# Patient Record
Sex: MALE | Race: WHITE | Employment: OTHER | URBAN - METROPOLITAN AREA
[De-identification: names, ages, dates, MRNs, and addresses within clinical notes are randomized per-mention and may not be internally consistent; named-entity substitution may affect disease eponyms.]

---

## 2017-05-16 ENCOUNTER — OFFICE VISIT (OUTPATIENT)
Dept: ENDOCRINOLOGY | Age: 72
End: 2017-05-16

## 2017-05-16 VITALS
HEART RATE: 72 BPM | HEIGHT: 67 IN | DIASTOLIC BLOOD PRESSURE: 75 MMHG | TEMPERATURE: 97.7 F | RESPIRATION RATE: 18 BRPM | BODY MASS INDEX: 26.68 KG/M2 | OXYGEN SATURATION: 98 % | SYSTOLIC BLOOD PRESSURE: 144 MMHG | WEIGHT: 170 LBS

## 2017-05-16 DIAGNOSIS — E78.2 MIXED HYPERLIPIDEMIA: ICD-10-CM

## 2017-05-16 DIAGNOSIS — I10 ESSENTIAL HYPERTENSION: ICD-10-CM

## 2017-05-16 RX ORDER — SITAGLIPTIN 100 MG/1
1 TABLET, FILM COATED ORAL DAILY
Refills: 3 | COMMUNITY
Start: 2017-05-01 | End: 2017-05-16 | Stop reason: SDUPTHER

## 2017-05-16 RX ORDER — GLIPIZIDE 5 MG/1
TABLET ORAL
Qty: 60 TAB | Refills: 6 | Status: SHIPPED | OUTPATIENT
Start: 2017-05-16 | End: 2018-01-09 | Stop reason: SDUPTHER

## 2017-05-16 RX ORDER — CETIRIZINE HCL 10 MG
10 TABLET ORAL DAILY
COMMUNITY

## 2017-05-16 RX ORDER — METFORMIN HYDROCHLORIDE 500 MG/1
1000 TABLET ORAL 2 TIMES DAILY WITH MEALS
Qty: 120 TAB | Refills: 6 | Status: SHIPPED | OUTPATIENT
Start: 2017-05-16 | End: 2018-01-09 | Stop reason: SDUPTHER

## 2017-05-16 RX ORDER — LANCETS 28 GAUGE
EACH MISCELLANEOUS
Qty: 100 LANCET | Refills: 6 | Status: SHIPPED | OUTPATIENT
Start: 2017-05-16 | End: 2017-06-26 | Stop reason: SDUPTHER

## 2017-05-16 NOTE — MR AVS SNAPSHOT
Visit Information Date & Time Provider Department Dept. Phone Encounter #  
 5/16/2017  3:00 PM Airam Todd MD [facility] [phone] [id number] Follow-up Instructions Return in about 3 months (around 8/16/2017). Upcoming Health Maintenance Date Due Hepatitis C Screening 1945 DTaP/Tdap/Td series (1 - Tdap) 11/19/1967 FOBT Q 1 YEAR AGE 50-75 11/19/1996 ZOSTER VACCINE AGE 60> 11/19/2006 GLAUCOMA SCREENING Q2Y 11/19/2011 Pneumococcal 65+ Low/Medium Risk (1 of 2 - PCV13) 11/19/2011 MEDICARE YEARLY EXAM 11/19/2011 INFLUENZA AGE 9 TO ADULT 8/1/2017 Allergies as of 5/16/2017  Review Complete On: 5/16/2017 By: Airam Todd MD  
 No Known Allergies Current Immunizations  Never Reviewed No immunizations on file. Not reviewed this visit Vitals BP Pulse Temp Resp Height(growth percentile) Weight(growth percentile) 144/75 72 97.7 °F (36.5 °C) (Oral) 18 5' 7\" (1.702 m) 170 lb (77.1 kg) SpO2 BMI Smoking Status 98% 26.63 kg/m2 Never Smoker Vitals History BMI and BSA Data Body Mass Index Body Surface Area  
 26.63 kg/m 2 1.91 m 2 Your Updated Medication List  
  
   
This list is accurate as of: 5/16/17  4:06 PM.  Always use your most recent med list.  
  
  
  
  
 cetirizine 10 mg tablet Commonly known as:  ZYRTEC Take  by mouth. JANUVIA 100 mg tablet Generic drug:  SITagliptin Take 1 Tab by mouth daily. METFORMIN PO Take 500 mg by mouth two (2) times a day. Follow-up Instructions Return in about 3 months (around 8/16/2017). Patient Instructions Check blood sugars only twice a day by rotation as follows First day - before b-fast and - before lunch Second day- before dinner and  before bed time After 2 days go back to same rotation 
 
 
--------------------------------------------------------------------------- ---------------------- Stay on metformin  500 mg twice a day with meals  
 
 
januvia 100 mg a day Start on  Glipizide 5 mg twice  A day , twenty minutes before b-fast and dinner Stop glipizide ER  
 
 
--------------------------------------------------------------------------------------------------------- Do not skip meals Do not eat in between meals Reduce carbs- pasta, rice, potatoes, bread Do not drink juices or sodas Donot eat peanut butter Do not eat sugar free cookies and cakes Do not eat peaches , oranges, grapes, pineapples , raisins Introducing [facility] & HEALTH SERVICES! [facility] introduces [employer] patient portal. Now you can access parts of your medical record, email your doctor's office, and request medication refills online. 1. In your internet browser, go to [email or web address] 2. Click on the First Time User? Click Here link in the Sign In box. You will see the New Member Sign Up page. 3. Enter your [employer] Access Code exactly as it appears below. You will not need to use this code after youve completed the sign-up process. If you do not sign up before the expiration date, you must request a new code. · [employer] Access Code: [email or web address] Expires: 8/14/2017  4:06 PM 
 
4. Enter the last four digits of your Social Security Number (xxxx) and Date of Birth (mm/dd/yyyy) as indicated and click Submit. You will be taken to the next sign-up page. 5. Create a Telunjukt ID. This will be your [employer] login ID and cannot be changed, so think of one that is secure and easy to remember. 6. Create a [employer] password. You can change your password at any time. 7. Enter your Password Reset Question and Answer. This can be used at a later time if you forget your password. 8. Enter your e-mail address. You will receive e-mail notification when new information is available in [address] 9. Click Sign Up. You can now view and download portions of your medical record. 10. Click the Download Summary menu link to download a portable copy of your medical information. If you have questions, please visit the Frequently Asked Questions section of the [employer] website. Remember, [employer] is NOT to be used for urgent needs. For medical emergencies, dial 911. Now available from your iPhone and Android! Please provide this summary of care documentation to your next provider. Your primary care clinician is listed as Yani Palumbo III. If you have any questions after today's visit, please call [phone].

## 2017-05-16 NOTE — PATIENT INSTRUCTIONS
Check blood sugars only twice a day by rotation as follows    First day - before b-fast and - before lunch  Second day- before dinner and  before bed time    After 2 days go back to same rotation      -------------------------------------------------------------------------------------------------    Stay on metformin  500 mg twice a day with meals       januvia 100 mg a day       Start on  Glipizide 5 mg twice  A day , twenty minutes before b-fast and dinner   Stop glipizide ER       ---------------------------------------------------------------------------------------------------------          Do not skip meals  Do not eat in between meals    Reduce carbs- pasta, rice, potatoes, bread   Do not drink juices or sodas  Donot eat peanut butter     Do not eat sugar free cookies and cakes   Do not eat peaches , oranges, grapes, pineapples , raisins

## 2017-05-16 NOTE — PROGRESS NOTES
HISTORY OF PRESENT ILLNESS  Anuj Zambrano is a 80 y.o. male. HPI  Patient here for initial visit of Type 2 diabetes mellitus . Referred : by pcp    H/o diabetes for 10  years     Current A1C is 8.7 %   From   March 8 2017  and symptoms/problems include fluctuant sugars  and polyuria     Current diabetic medications include glipizide xr 2.5 mg a day and metformin. Current monitoring regimen: home blood tests - daily  Home blood sugar records: trend: fluctuating a lot  Any episodes of hypoglycemia? no    Weight trend: fluctuating a lot  Prior visit with dietician: no  Current diet: \"unhealthy\" diet in general  Current exercise: no regular exercise    Known diabetic complications: none  Cardiovascular risk factors: dyslipidemia, diabetes mellitus, obesity, male gender, hypertension, stress    Eye exam current (within one year): yes  AISHA: no     Past Medical History:   Diagnosis Date    DM (diabetes mellitus) ([facility] [address]      Past Surgical History:   Procedure Laterality Date    HX CYST REMOVAL  1971    HX HEMORRHOIDECTOMY      HX TONSILLECTOMY  1950     Current Outpatient Prescriptions   Medication Sig    JANUVIA 100 mg tablet Take 1 Tab by mouth daily.  METFORMIN HCL (METFORMIN PO) Take 500 mg by mouth two (2) times a day.  GLIPIZIDE PO Take 2.5 mg by mouth two (2) times a day.  cetirizine (ZYRTEC) 10 mg tablet Take  by mouth. No current facility-administered medications for this visit. Review of Systems   Constitutional: Negative. HENT: Negative. Eyes: Negative for pain and redness. Respiratory: Negative. Cardiovascular: Negative for chest pain, palpitations and leg swelling. Gastrointestinal: Negative. Negative for constipation. Genitourinary: Negative. Musculoskeletal: Negative for myalgias. Skin: Negative. Neurological: Negative. Endo/Heme/Allergies: Negative. Psychiatric/Behavioral: Negative for depression and memory loss. The patient does not have insomnia. Physical Exam   Constitutional: He is oriented to person, place, and time. He appears well-developed and well-nourished. HENT:   Head: Normocephalic. Eyes: Conjunctivae and EOM are normal. Pupils are equal, round, and reactive to light. Neck: Normal range of motion. Neck supple. No JVD present. No tracheal deviation present. No thyromegaly present. Cardiovascular: Normal rate, regular rhythm and normal heart sounds. Pulmonary/Chest: Effort normal and breath sounds normal.   Abdominal: Soft. Bowel sounds are normal.   Musculoskeletal: Normal range of motion. Lymphadenopathy:     He has no cervical adenopathy. Neurological: He is alert and oriented to person, place, and time. He has normal reflexes. Skin: Skin is warm. Psychiatric: He has a normal mood and affect. Reviewed infor from pcp       ASSESSMENT and PLAN    1. Type 2 DM uncontrolled : a1c is 8.7 %    From march 8 2017   Discussed DM 2 patho-physiology     Stay on metformin  500 mg twice a day with meals   januvia 100 mg a day   Start on  Glipizide 5 mg twice  A day , twenty minutes before b-fast and dinner   Stop glipizide ER     Patient is advised to check blood sugars 1-2 times daily by rotation method. reviewed medications and side effects in detail  lab results and schedule of future lab studies reviewed with patient    specific diabetic recommendations: diabetic diet discussed in detail, written exchange diet given, low cholesterol diet, weight control and daily exercise discussed, home glucose monitoring emphasized, glucose meter dispensed to patient, all medications, side effects and compliance discussed carefully, use and side effects of insulin is taught, foot care discussed and Podiatry visits discussed, annual eye examinations at Ophthalmology discussed, glycohemoglobin and other lab monitoring discussed and long term diabetic complications discussed    2. Hypoglycemia :  Educated on treating the hypoglycemia. 3. HTN : continue current care. Patient is educated about importance of compliance with anti-hypertensives especially ARB/ACEI    4. Dyslipidemia : continue current meds. Patient is educated about benefits and adverse effects of statins and explained how benefits outweigh risk.     5. use of aspirin to prevent MI and TIA's discussed        > 50 % of time is spent on counseling

## 2017-05-22 PROBLEM — E78.2 MIXED HYPERLIPIDEMIA: Status: ACTIVE | Noted: 2017-05-22

## 2017-05-22 PROBLEM — I10 ESSENTIAL HYPERTENSION: Status: ACTIVE | Noted: 2017-05-22

## 2017-06-26 RX ORDER — LANCETS 28 GAUGE
EACH MISCELLANEOUS
Qty: 200 LANCET | Refills: 6 | Status: SHIPPED | OUTPATIENT
Start: 2017-06-26 | End: 2017-06-26 | Stop reason: SDUPTHER

## 2017-06-26 NOTE — TELEPHONE ENCOUNTER
Patient says he would like prescription for testing strips 4 times per day. Patient says he is running out of test strips because he has to test several time due to low blood sugar.

## 2017-06-27 RX ORDER — GLUCOSAM/CHON-MSM1/C/MANG/BOSW 500-416.6
TABLET ORAL
Qty: 400 LANCET | Refills: 6 | Status: SHIPPED | OUTPATIENT
Start: 2017-06-27 | End: 2021-08-09

## 2017-08-24 ENCOUNTER — OFFICE VISIT (OUTPATIENT)
Dept: ENDOCRINOLOGY | Age: 72
End: 2017-08-24

## 2017-08-24 VITALS
HEART RATE: 75 BPM | OXYGEN SATURATION: 99 % | SYSTOLIC BLOOD PRESSURE: 138 MMHG | DIASTOLIC BLOOD PRESSURE: 67 MMHG | RESPIRATION RATE: 16 BRPM | BODY MASS INDEX: 26.12 KG/M2 | TEMPERATURE: 97 F | HEIGHT: 67 IN | WEIGHT: 166.4 LBS

## 2017-08-24 DIAGNOSIS — I10 ESSENTIAL HYPERTENSION: ICD-10-CM

## 2017-08-24 DIAGNOSIS — E78.2 MIXED HYPERLIPIDEMIA: ICD-10-CM

## 2017-08-24 NOTE — PROGRESS NOTES
Leida Zambrano is a 80 y.o. male here for   Chief Complaint   Patient presents with    Diabetes       Functional glucose monitor and record keeping system? - yes  Eye exam within last year? - yes  Foot exam within last year? - no    1. Have you been to the ER, urgent care clinic since your last visit? Hospitalized since your last visit? - no    2. Have you seen or consulted any other health care providers outside of the [address] [place] [address] since your last visit?   Include any pap smears or colon screening.-      No results found for: HBA1C, HGBE8, EAI4NLBG, LGX0KKFG    Wt Readings from Last 3 Encounters:   08/24/17 166 lb 6.4 oz (75.5 kg)   05/16/17 170 lb (77.1 kg)     Temp Readings from Last 3 Encounters:   08/24/17 97 °F (36.1 °C) (Oral)   05/16/17 97.7 °F (36.5 °C) (Oral)     BP Readings from Last 3 Encounters:   08/24/17 138/67   05/16/17 144/75     Pulse Readings from Last 3 Encounters:   08/24/17 75   05/16/17 72

## 2017-08-24 NOTE — PROGRESS NOTES
HISTORY OF PRESENT ILLNESS  Sheryl Zambrano is a 80 y.o. male. HPI  Patient here for  Second visit  After  initial visit of Type 2 diabetes mellitus from May 2017       Lost 4 lbs    Kept log very well   He followed   Instructions so diligently   He has questions like , why fluctuant even though his sugars are great         Referred : by pcp    H/o diabetes for 10  years     Current A1C is 8.7 %   From   March 8 2017  and symptoms/problems include fluctuant sugars  and polyuria     Current diabetic medications include glipizide xr 2.5 mg a day and metformin. Current monitoring regimen: home blood tests - daily  Home blood sugar records: trend: fluctuating a lot  Any episodes of hypoglycemia? no    Weight trend: fluctuating a lot  Prior visit with dietician: no  Current diet: \"unhealthy\" diet in general  Current exercise: no regular exercise    Known diabetic complications: none  Cardiovascular risk factors: dyslipidemia, diabetes mellitus, obesity, male gender, hypertension, stress    Eye exam current (within one year): yes  AISHA: no     Past Medical History:   Diagnosis Date    DM (diabetes mellitus) ([facility] [address]      Past Surgical History:   Procedure Laterality Date    HX CYST REMOVAL  1971    HX HEMORRHOIDECTOMY      HX TONSILLECTOMY  1950     Current Outpatient Prescriptions   Medication Sig    TRUEPLUS LANCETS 28 gauge misc TEST FOUR TIMES DAILY    glucose blood VI test strips (TRUE METRIX GLUCOSE TEST STRIP) strip Test 4 times daily. Dx code E11.65    cetirizine (ZYRTEC) 10 mg tablet Take  by mouth.  SITagliptin (JANUVIA) 100 mg tablet Take 1 Tab by mouth daily.  metFORMIN (GLUCOPHAGE) 500 mg tablet Take 2 Tabs by mouth two (2) times daily (with meals). Note the increase    glipiZIDE (GLUCOTROL) 5 mg tablet Take 1 tab 20 minutes before breakfast, and 1 tab 20 minutes before dinner. Stop XR     No current facility-administered medications for this visit.         Review of Systems   Constitutional: Negative. HENT: Negative. Eyes: Negative for pain and redness. Respiratory: Negative. Cardiovascular: Negative for chest pain, palpitations and leg swelling. Gastrointestinal: Negative. Negative for constipation. Genitourinary: Negative. Musculoskeletal: Negative for myalgias. Skin: Negative. Neurological: Negative. Endo/Heme/Allergies: Negative. Psychiatric/Behavioral: Negative for depression and memory loss. The patient does not have insomnia. Physical Exam   Constitutional: He is oriented to person, place, and time. He appears well-developed and well-nourished. HENT:   Head: Normocephalic. Eyes: Conjunctivae and EOM are normal. Pupils are equal, round, and reactive to light. Neck: Normal range of motion. Neck supple. No JVD present. No tracheal deviation present. No thyromegaly present. Cardiovascular: Normal rate, regular rhythm and normal heart sounds. Pulmonary/Chest: Effort normal and breath sounds normal.   Abdominal: Soft. Bowel sounds are normal.   Musculoskeletal: Normal range of motion. Lymphadenopathy:     He has no cervical adenopathy. Neurological: He is alert and oriented to person, place, and time. He has normal reflexes. Skin: Skin is warm. Psychiatric: He has a normal mood and affect. Labs reviewed from aug 16 2017 from pcp       ASSESSMENT and PLAN    1. Type 2 DM uncontrolled : a1c is   7 %     From    August 16th 2017     Compared to   8.7 %    From march 8 2017   Good improvement   Increase  metformin  500 mg  To two pills twice a day with meals   januvia 100 mg a day   Start on  Glipizide 5 mg twice  A day , twenty minutes before b-fast and dinner   Stop glipizide ER     Patient is advised to check blood sugars 1-2 times daily by rotation method. reviewed medications and side effects in detail  lab results and schedule of future lab studies reviewed with patient      2. Hypoglycemia :  Educated on treating the hypoglycemia. 3. HTN : continue current care. Patient is educated about importance of compliance with anti-hypertensives especially ARB/ACEI    4. Dyslipidemia : continue current meds. Patient is educated about benefits and adverse effects of statins and explained how benefits outweigh risk.     5. use of aspirin to prevent MI and TIA's discussed        > 50 % of time is spent on counseling

## 2017-08-24 NOTE — MR AVS SNAPSHOT
Visit Information Date & Time Provider Department Dept. Phone Encounter #  
 8/24/2017  9:45 AM Manas Soto MD [facility] [phone] [id number] Follow-up Instructions Return in about 6 months (around 2/24/2018). Upcoming Health Maintenance Date Due Hepatitis C Screening 1945 HEMOGLOBIN A1C Q6M 1945 FOOT EXAM Q1 11/19/1956 MICROALBUMIN Q1 11/19/1956 EYE EXAM RETINAL OR DILATED Q1 11/19/1956 DTaP/Tdap/Td series (1 - Tdap) 11/19/1967 FOBT Q 1 YEAR AGE 50-75 11/19/1996 ZOSTER VACCINE AGE 60> 9/19/2006 GLAUCOMA SCREENING Q2Y 11/19/2011 Pneumococcal 65+ Low/Medium Risk (1 of 2 - PCV13) 11/19/2011 MEDICARE YEARLY EXAM 11/19/2011 INFLUENZA AGE 9 TO ADULT 8/1/2017 LIPID PANEL Q1 3/9/2018 Allergies as of 8/24/2017  Review Complete On: 8/24/2017 By: Manas Soto MD  
 No Known Allergies Current Immunizations  Never Reviewed No immunizations on file. Not reviewed this visit You Were Diagnosed With   
  
 Codes Comments Uncontrolled type 2 diabetes mellitus with complication, without long-term current use of insulin ([facility] [address]    -  Primary ICD-10-CM: E11.8, E11.65 ICD-9-CM: 250.82 Essential hypertension     ICD-10-CM: I10 
ICD-9-CM: 401.9 Mixed hyperlipidemia     ICD-10-CM: E78.2 ICD-9-CM: 272.2 Vitals BP Pulse Temp Resp Height(growth percentile) Weight(growth percentile)  
 138/67 (BP 1 Location: Right arm, BP Patient Position: Sitting) 75 97 °F (36.1 °C) (Oral) 16 5' 7\" (1.702 m) 166 lb 6.4 oz (75.5 kg) SpO2 BMI Smoking Status 99% 26.06 kg/m2 Never Smoker Vitals History BMI and BSA Data Body Mass Index Body Surface Area 26.06 kg/m 2 1.89 m 2 Preferred Pharmacy Pharmacy Name Phone [facility] [address] [phone] Your Updated Medication List  
  
 This list is accurate as of: 8/24/17 10:51 AM.  Always use your most recent med list.  
  
  
  
  
 cetirizine 10 mg tablet Commonly known as:  ZYRTEC Take  by mouth. glipiZIDE 5 mg tablet Commonly known as:  Keyur Erps Take 1 tab 20 minutes before breakfast, and 1 tab 20 minutes before dinner. Stop XR  
  
 glucose blood VI test strips strip Commonly known as:  TRUE METRIX GLUCOSE TEST STRIP Test 4 times daily. Dx code E11.65  
  
 metFORMIN 500 mg tablet Commonly known as:  GLUCOPHAGE Take 2 Tabs by mouth two (2) times daily (with meals). Note the increase SITagliptin 100 mg tablet Commonly known as:  Henrietta Lashawn Take 1 Tab by mouth daily. TRUEPLUS LANCETS 28 gauge Misc Generic drug:  lancets TEST FOUR TIMES DAILY Follow-up Instructions Return in about 6 months (around 2/24/2018). Patient Instructions Check blood sugars only twice a day by rotation as follows First day - before b-fast and - before lunch Second day- before dinner and  before bed time After 2 days go back to same rotation 
 
 
------------------------------------------------------------------------------------------------- Increase  On  metformin  500 mg to 2 pills  twice a day with meals  
 
 
contineu  On januvia 100 mg a day Stay  on  Glipizide 5 mg twice  A day , twenty minutes before b-fast and dinner Stop glipizide ER  
 
 
--------------------------------------------------------------------------------------------------------- Do not skip meals Do not eat in between meals Reduce carbs- pasta, rice, potatoes, bread Do not drink juices or sodas Donot eat peanut butter Do not eat sugar free cookies and cakes Do not eat peaches , oranges, grapes, pineapples , raisins Introducing [facility] & [facility]!    
 [facility] introduces [employer] patient portal. Now you can access parts of your medical record, email your doctor's office, and request medication refills online. 1. In your internet browser, go to [email or web address] 2. Click on the First Time User? Click Here link in the Sign In box. You will see the New Member Sign Up page. 3. Enter your [employer] Access Code exactly as it appears below. You will not need to use this code after youve completed the sign-up process. If you do not sign up before the expiration date, you must request a new code. · [employer] Access Code: [email or web address] Expires: 11/22/2017 10:50 AM 
 
4. Enter the last four digits of your Social Security Number (xxxx) and Date of Birth (mm/dd/yyyy) as indicated and click Submit. You will be taken to the next sign-up page. 5. Create a [employer] ID. This will be your [employer] login ID and cannot be changed, so think of one that is secure and easy to remember. 6. Create a [employer] password. You can change your password at any time. 7. Enter your Password Reset Question and Answer. This can be used at a later time if you forget your password. 8. Enter your e-mail address. You will receive e-mail notification when new information is available in [address] 9. Click Sign Up. You can now view and download portions of your medical record. 10. Click the Download Summary menu link to download a portable copy of your medical information. If you have questions, please visit the Frequently Asked Questions section of the [employer] website. Remember, [employer] is NOT to be used for urgent needs. For medical emergencies, dial 911. Now available from your iPhone and Android! Please provide this summary of care documentation to your next provider. Your primary care clinician is listed as Jonathan Yanes III. If you have any questions after today's visit, please call [phone].

## 2017-08-24 NOTE — PATIENT INSTRUCTIONS
Check blood sugars only twice a day by rotation as follows    First day - before b-fast and - before lunch  Second day- before dinner and  before bed time    After 2 days go back to same rotation      -------------------------------------------------------------------------------------------------    Increase  On  metformin  500 mg to 2 pills  twice a day with meals       contineu  On januvia 100 mg a day       Stay  on  Glipizide 5 mg twice  A day , twenty minutes before b-fast and dinner   Stop glipizide ER       ---------------------------------------------------------------------------------------------------------          Do not skip meals  Do not eat in between meals    Reduce carbs- pasta, rice, potatoes, bread   Do not drink juices or sodas  Donot eat peanut butter     Do not eat sugar free cookies and cakes   Do not eat peaches , oranges, grapes, pineapples , raisins

## 2018-01-09 ENCOUNTER — TELEPHONE (OUTPATIENT)
Dept: ENDOCRINOLOGY | Age: 73
End: 2018-01-09

## 2018-01-09 RX ORDER — GLIPIZIDE 5 MG/1
TABLET ORAL
Qty: 60 TAB | Refills: 6 | Status: SHIPPED | OUTPATIENT
Start: 2018-01-09 | End: 2018-01-11 | Stop reason: SDUPTHER

## 2018-01-09 RX ORDER — METFORMIN HYDROCHLORIDE 500 MG/1
1000 TABLET ORAL 2 TIMES DAILY WITH MEALS
Qty: 120 TAB | Refills: 6 | Status: SHIPPED | OUTPATIENT
Start: 2018-01-09 | End: 2018-01-11 | Stop reason: SDUPTHER

## 2018-01-11 RX ORDER — GLIPIZIDE 5 MG/1
TABLET ORAL
Qty: 180 TAB | Refills: 4 | Status: SHIPPED | OUTPATIENT
Start: 2018-01-11 | End: 2019-04-11 | Stop reason: SDUPTHER

## 2018-01-11 RX ORDER — METFORMIN HYDROCHLORIDE 500 MG/1
1000 TABLET ORAL 2 TIMES DAILY WITH MEALS
Qty: 360 TAB | Refills: 4 | Status: SHIPPED | OUTPATIENT
Start: 2018-01-11 | End: 2019-01-30 | Stop reason: SDUPTHER

## 2018-02-08 LAB
CREATININE, EXTERNAL: 0.97
HBA1C MFR BLD HPLC: 7.2 %
LDL-C, EXTERNAL: 126

## 2018-02-16 ENCOUNTER — OFFICE VISIT (OUTPATIENT)
Dept: ENDOCRINOLOGY | Age: 73
End: 2018-02-16

## 2018-02-16 VITALS
DIASTOLIC BLOOD PRESSURE: 67 MMHG | WEIGHT: 165.9 LBS | RESPIRATION RATE: 18 BRPM | OXYGEN SATURATION: 97 % | BODY MASS INDEX: 26.04 KG/M2 | HEART RATE: 90 BPM | TEMPERATURE: 98 F | HEIGHT: 67 IN | SYSTOLIC BLOOD PRESSURE: 124 MMHG

## 2018-02-16 DIAGNOSIS — E11.65 UNCONTROLLED TYPE 2 DIABETES MELLITUS WITH HYPERGLYCEMIA, WITHOUT LONG-TERM CURRENT USE OF INSULIN (HCC): Primary | ICD-10-CM

## 2018-02-16 DIAGNOSIS — I10 ESSENTIAL HYPERTENSION: ICD-10-CM

## 2018-02-16 DIAGNOSIS — E78.2 MIXED HYPERLIPIDEMIA: ICD-10-CM

## 2018-02-16 RX ORDER — PRAVASTATIN SODIUM 20 MG/1
20 TABLET ORAL
Qty: 90 TAB | Refills: 4 | Status: SHIPPED | OUTPATIENT
Start: 2018-02-16 | End: 2019-05-30 | Stop reason: SDUPTHER

## 2018-02-16 NOTE — MR AVS SNAPSHOT
[address] [facility] [address] 
[phone] Patient: Charlie Ramon MRN: [id number] :1945 Visit Information Date & Time Provider Department Dept. Phone Encounter #  
 2018  9:00 AM Amador Andersen MD [facility] Diabetes & Endocrinology [phone] [id number] Upcoming Health Maintenance Date Due Hepatitis C Screening 1945 FOOT EXAM Q1 1956 EYE EXAM RETINAL OR DILATED Q1 1956 DTaP/Tdap/Td series (1 - Tdap) 1967 FOBT Q 1 YEAR AGE 50-75 1996 ZOSTER VACCINE AGE 60> 2006 GLAUCOMA SCREENING Q2Y 2011 Pneumococcal 65+ Low/Medium Risk (1 of 2 - PCV13) 2011 MEDICARE YEARLY EXAM 2011 Influenza Age 5 to Adult 2017 HEMOGLOBIN A1C Q6M 2018 MICROALBUMIN Q1 2018 LIPID PANEL Q1 2018 Allergies as of 2018  Review Complete On: 2018 By: Amador nAdersen MD  
 No Known Allergies Current Immunizations  Never Reviewed No immunizations on file. Not reviewed this visit Vitals BP Pulse Temp Resp Height(growth percentile) Weight(growth percentile) 124/67 (BP 1 Location: Right arm, BP Patient Position: Sitting) 90 98 °F (36.7 °C) (Oral) 18 5' 7\" (1.702 m) 165 lb 14.4 oz (75.3 kg) SpO2 BMI Smoking Status 97% 25.98 kg/m2 Never Smoker BMI and BSA Data Body Mass Index Body Surface Area  
 25.98 kg/m 2 1.89 m 2 Preferred Pharmacy Pharmacy Name Phone [id number]- [place], [place] - [address] AT [facility] [phone] Your Updated Medication List  
  
   
This list is accurate as of: 18  9:46 AM.  Always use your most recent med list.  
  
  
  
  
 cetirizine 10 mg tablet Commonly known as:  ZYRTEC Take  by mouth. glipiZIDE 5 mg tablet Commonly known as:  Mahendra Garcia Take 1 tab 20 minutes before breakfast, and 1 tab 20 minutes before dinner. Stop XR  
  
 glucose blood VI test strips strip Commonly known as:  TRUE METRIX GLUCOSE TEST STRIP Test 4 times daily. Dx code E11.65  
  
 metFORMIN 500 mg tablet Commonly known as:  GLUCOPHAGE Take 2 Tabs by mouth two (2) times daily (with meals). Note the increase SITagliptin 100 mg tablet Commonly known as:  Marrianne Orange Take 1 Tab by mouth daily. TRUEPLUS LANCETS 28 gauge Misc Generic drug:  lancets TEST FOUR TIMES DAILY Patient Instructions Check blood sugars only twice a day by rotation as follows First day - before b-fast and - before lunch Second day- before dinner and  before bed time After 2 days go back to same rotation 
 
 
------------------------------------------------------------------------------------------------- Start on pravachol 20 mg at night Stay  on  metformin  500 mg to 2 pills  twice a day with meals  
 
 
continue  On januvia 100 mg a day Stay  on  Glipizide 5 mg twice  A day , twenty minutes before b-fast and dinner Stop glipizide ER  
 
 
--------------------------------------------------------------------------------------------------------- Do not skip meals Do not eat in between meals Reduce carbs- pasta, rice, potatoes, bread Do not drink juices or sodas Donot eat peanut butter Do not eat sugar free cookies and cakes Do not eat peaches , oranges, grapes, pineapples , raisins Introducing [facility]! Jenn Villatoro introduces PetMD patient portal. Now you can access parts of your medical record, email your doctor's office, and request medication refills online. 1. In your internet browser, go to [email or web address] 2. Click on the First Time User? Click Here link in the Sign In box. You will see the New Member Sign Up page. 3. Enter your Petflow Access Code exactly as it appears below. You will not need to use this code after youve completed the sign-up process. If you do not sign up before the expiration date, you must request a new code. · Petflow Access Code: [email or web address] Expires: 5/17/2018  9:46 AM 
 
4. Enter the last four digits of your Social Security Number (xxxx) and Date of Birth (mm/dd/yyyy) as indicated and click Submit. You will be taken to the next sign-up page. 5. Create a Petflow ID. This will be your Petflow login ID and cannot be changed, so think of one that is secure and easy to remember. 6. Create a Petflow password. You can change your password at any time. 7. Enter your Password Reset Question and Answer. This can be used at a later time if you forget your password. 8. Enter your e-mail address. You will receive e-mail notification when new information is available in [address] 9. Click Sign Up. You can now view and download portions of your medical record. 10. Click the Download Summary menu link to download a portable copy of your medical information. If you have questions, please visit the Frequently Asked Questions section of the Petflow website. Remember, Petflow is NOT to be used for urgent needs. For medical emergencies, dial 911. Now available from your iPhone and Android! Please provide this summary of care documentation to your next provider. Your primary care clinician is listed as Marc Hernández III. If you have any questions after today's visit, please call [phone].

## 2018-02-16 NOTE — PROGRESS NOTES
HISTORY OF PRESENT ILLNESS  Aldo Zambrano is a 71 y.o. male. HPI  Patient here for third visit  After  initial visit of Type 2 diabetes mellitus from Aug 24  2017       Lost 4 lbs    Kept log very well     Some sugars are high - 150 to 189 mg   He stopped [place] and [place] as he went into donut hole last year in nov per advise of his pcp     He had labs at pcp          Referred : by pcp    H/o diabetes for 10  years     Current A1C is 8.7 %   From   March 8 2017  and symptoms/problems include fluctuant sugars  and polyuria     Current diabetic medications include glipizide xr 2.5 mg a day and metformin. Current monitoring regimen: home blood tests - daily  Home blood sugar records: trend: fluctuating a lot  Any episodes of hypoglycemia? no    Weight trend: fluctuating a lot  Prior visit with dietician: no  Current diet: \"unhealthy\" diet in general  Current exercise: no regular exercise    Known diabetic complications: none  Cardiovascular risk factors: dyslipidemia, diabetes mellitus, obesity, male gender, hypertension, stress    Eye exam current (within one year): yes  AISHA: no     Past Medical History:   Diagnosis Date    DM (diabetes mellitus) ([facility] [address]      Past Surgical History:   Procedure Laterality Date    HX CYST REMOVAL  1971    HX HEMORRHOIDECTOMY      HX TONSILLECTOMY  1950     Current Outpatient Prescriptions   Medication Sig    metFORMIN (GLUCOPHAGE) 500 mg tablet Take 2 Tabs by mouth two (2) times daily (with meals). Note the increase    glipiZIDE (GLUCOTROL) 5 mg tablet Take 1 tab 20 minutes before breakfast, and 1 tab 20 minutes before dinner. Stop XR    TRUEPLUS LANCETS 28 gauge misc TEST FOUR TIMES DAILY    glucose blood VI test strips (TRUE METRIX GLUCOSE TEST STRIP) strip Test 4 times daily. Dx code E11.65    cetirizine (ZYRTEC) 10 mg tablet Take  by mouth.  SITagliptin (JANUVIA) 100 mg tablet Take 1 Tab by mouth daily. No current facility-administered medications for this visit. Review of Systems   Constitutional: Negative. HENT: Negative. Eyes: Negative for pain and redness. Respiratory: Negative. Cardiovascular: Negative for chest pain, palpitations and leg swelling. Gastrointestinal: Negative. Negative for constipation. Genitourinary: Negative. Musculoskeletal: Negative for myalgias. Skin: Negative. Neurological: Negative. Endo/Heme/Allergies: Negative. Psychiatric/Behavioral: Negative for depression and memory loss. The patient does not have insomnia. Physical Exam   Constitutional: He is oriented to person, place, and time. He appears well-developed and well-nourished. HENT:   Head: Normocephalic. Eyes: Conjunctivae and EOM are normal. Pupils are equal, round, and reactive to light. Neck: Normal range of motion. Neck supple. No JVD present. No tracheal deviation present. No thyromegaly present. Cardiovascular: Normal rate, regular rhythm and normal heart sounds. Pulmonary/Chest: Effort normal and breath sounds normal.   Abdominal: Soft. Bowel sounds are normal.   Musculoskeletal: Normal range of motion. Lymphadenopathy:     He has no cervical adenopathy. Neurological: He is alert and oriented to person, place, and time. He has normal reflexes. Skin: Skin is warm. Psychiatric: He has a normal mood and affect. 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 Type 2 DM uncontrolled : a1c is   7.2 %    From   Nov 2017  Compared to   7 %     From    August 16th 2017     Compared to   8.7 %    From march 8 2017   Lost control a bit  As he stopped [place] while in donut hole   Stay on  metformin  500 mg  To two pills twice a day with meals   januvia 100 mg a day , resume   Stay  on  Glipizide 5 mg twice  A day , twenty minutes before b-fast and dinner   Stopped  glipizide ER     Patient is advised to check blood sugars 1-2 times daily by rotation method. reviewed medications and side effects in detail  lab results and schedule of future lab studies reviewed with patient      2. Hypoglycemia :  Educated on treating the hypoglycemia. 3. HTN : not on any ACEI or ARB, does not have proteinuria . Patient is educated about importance of compliance with anti-hypertensives especially ARB/ACEI    4. Dyslipidemia : lipids are off, LDL is  120 plus unlike last visit in aug 2017 . ACC calc risk is 31 %  Start on pravachol 20 mg at night     Patient is educated about benefits and adverse effects of statins and explained how benefits outweigh risk.     5. use of aspirin to prevent MI and TIA's discussed        > 50 % of time is spent on counseling   Patient voiced understanding her plan of care

## 2018-02-16 NOTE — PATIENT INSTRUCTIONS
Check blood sugars only twice a day by rotation as follows    First day - before b-fast and - before lunch  Second day- before dinner and  before bed time    After 2 days go back to same rotation      -------------------------------------------------------------------------------------------------  Start on pravachol 20 mg at night       Stay  on  metformin  500 mg to 2 pills  twice a day with meals       continue  On januvia 100 mg a day       Stay  on  Glipizide 5 mg twice  A day , twenty minutes before b-fast and dinner   Stop glipizide ER       ---------------------------------------------------------------------------------------------------------          Do not skip meals  Do not eat in between meals    Reduce carbs- pasta, rice, potatoes, bread   Do not drink juices or sodas  Donot eat peanut butter     Do not eat sugar free cookies and cakes   Do not eat peaches , oranges, grapes, pineapples , raisins

## 2018-02-16 NOTE — PROGRESS NOTES
Wt Readings from Last 3 Encounters:   02/16/18 165 lb 14.4 oz (75.3 kg)   08/24/17 166 lb 6.4 oz (75.5 kg)   05/16/17 170 lb (77.1 kg)     Temp Readings from Last 3 Encounters:   02/16/18 98 °F (36.7 °C) (Oral)   08/24/17 97 °F (36.1 °C) (Oral)   05/16/17 97.7 °F (36.5 °C) (Oral)     BP Readings from Last 3 Encounters:   02/16/18 124/67   08/24/17 138/67   05/16/17 144/75     Pulse Readings from Last 3 Encounters:   02/16/18 90   08/24/17 75   05/16/17 72

## 2018-05-15 LAB
CREATININE, EXTERNAL: 0.88
HBA1C MFR BLD HPLC: 6.9 %
LDL-C, EXTERNAL: 64
PSA, EXTERNAL: 3.7

## 2018-05-29 ENCOUNTER — OFFICE VISIT (OUTPATIENT)
Dept: ENDOCRINOLOGY | Age: 73
End: 2018-05-29

## 2018-05-29 VITALS
WEIGHT: 162.8 LBS | SYSTOLIC BLOOD PRESSURE: 125 MMHG | RESPIRATION RATE: 14 BRPM | TEMPERATURE: 97.6 F | DIASTOLIC BLOOD PRESSURE: 70 MMHG | HEART RATE: 79 BPM | BODY MASS INDEX: 25.55 KG/M2 | HEIGHT: 67 IN | OXYGEN SATURATION: 98 %

## 2018-05-29 DIAGNOSIS — I10 ESSENTIAL HYPERTENSION: ICD-10-CM

## 2018-05-29 DIAGNOSIS — E78.2 MIXED HYPERLIPIDEMIA: ICD-10-CM

## 2018-05-29 NOTE — PATIENT INSTRUCTIONS
Check blood sugars only twice a day by rotation as follows    First day - before b-fast and - before lunch  Second day- before dinner and  before bed time    After 2 days go back to same rotation      -------------------------------------------------------------------------------------------------  Stay  on pravachol 20 mg at night       Stay  on  metformin  500 mg to 2 pills  twice a day with meals       continue  On januvia 100 mg a day       Stay  on  Glipizide 5 mg twice  A day , twenty minutes before b-fast and dinner   Stop glipizide ER       ---------------------------------------------------------------------------------------------------------          Do not skip meals  Do not eat in between meals    Reduce carbs- pasta, rice, potatoes, bread   Do not drink juices or sodas  Donot eat peanut butter     Do not eat sugar free cookies and cakes   Do not eat peaches , oranges, grapes, pineapples , raisins

## 2018-05-29 NOTE — MR AVS SNAPSHOT
[address] [place] [id number] 
[phone] Patient: Inocencia Jauregui MRN: [id number] :1945 Visit Information Date & Time Provider Department Dept. Phone Encounter #  
 2018  9:15 AM Philippe Caballero MD [facility] [phone] [id number] Follow-up Instructions Return in about 1 year (around 2019). Upcoming Health Maintenance Date Due Hepatitis C Screening 1945 DTaP/Tdap/Td series (1 - Tdap) 1967 FOBT Q 1 YEAR AGE 50-75 1996 ZOSTER VACCINE AGE 60> 2006 Pneumococcal 65+ Low/Medium Risk (1 of 2 - PCV13) 2011 MEDICARE YEARLY EXAM 3/14/2018 EYE EXAM RETINAL OR DILATED Q1 2018 Influenza Age 5 to Adult 2018 HEMOGLOBIN A1C Q6M 2018 MICROALBUMIN Q1 2019 LIPID PANEL Q1 2019 FOOT EXAM Q1 2019 GLAUCOMA SCREENING Q2Y 2019 Allergies as of 2018  Review Complete On: 2018 By: Philippe Caballero MD  
 No Known Allergies Current Immunizations  Never Reviewed No immunizations on file. Not reviewed this visit You Were Diagnosed With   
  
 Codes Comments Uncontrolled type 2 diabetes mellitus with complication, without long-term current use of insulin ([facility] [address]    -  Primary ICD-10-CM: E11.8, E11.65 ICD-9-CM: 250.82 Essential hypertension     ICD-10-CM: I10 
ICD-9-CM: 401.9 Mixed hyperlipidemia     ICD-10-CM: E78.2 ICD-9-CM: 272.2 Vitals BP Pulse Temp Resp Height(growth percentile) Weight(growth percentile) 125/70 (BP 1 Location: Left arm, BP Patient Position: Sitting) 79 97.6 °F (36.4 °C) (Oral) 14 5' 7\" (1.702 m) 162 lb 12.8 oz (73.8 kg) SpO2 BMI Smoking Status 98% 25.5 kg/m2 Never Smoker Vitals History BMI and BSA Data Body Mass Index Body Surface Area 25.5 kg/m 2 1.87 m 2 Preferred Pharmacy Pharmacy Name Phone [id number]- [place], [place] - [address] [facility] [phone] Your Updated Medication List  
  
   
This list is accurate as of 5/29/18 10:06 AM.  Always use your most recent med list.  
  
  
  
  
 cetirizine 10 mg tablet Commonly known as:  ZYRTEC Take  by mouth. glipiZIDE 5 mg tablet Commonly known as:  Mayda Lat Take 1 tab 20 minutes before breakfast, and 1 tab 20 minutes before dinner. Stop XR  
  
 glucose blood VI test strips strip Commonly known as:  TRUE METRIX GLUCOSE TEST STRIP Test 4 times daily. Dx code E11.65  
  
 metFORMIN 500 mg tablet Commonly known as:  GLUCOPHAGE Take 2 Tabs by mouth two (2) times daily (with meals). Note the increase  
  
 pravastatin 20 mg tablet Commonly known as:  PRAVACHOL Take 1 Tab by mouth nightly. SITagliptin 100 mg tablet Commonly known as:  Dilma Melena Take 1 Tab by mouth daily. TRUEPLUS LANCETS 28 gauge Misc Generic drug:  lancets TEST FOUR TIMES DAILY Follow-up Instructions Return in about 1 year (around 5/29/2019). Patient Instructions Check blood sugars only twice a day by rotation as follows First day - before b-fast and - before lunch Second day- before dinner and  before bed time After 2 days go back to same rotation 
 
 
------------------------------------------------------------------------------------------------- Stay  on pravachol 20 mg at night Stay  on  metformin  500 mg to 2 pills  twice a day with meals  
 
 
continue  On januvia 100 mg a day Stay  on  Glipizide 5 mg twice  A day , twenty minutes before b-fast and dinner Stop glipizide ER  
 
 
--------------------------------------------------------------------------------------------------------- Do not skip meals Do not eat in between meals Reduce carbs- pasta, rice, potatoes, bread Do not drink juices or sodas Donot eat peanut butter Do not eat sugar free cookies and cakes Do not eat peaches , oranges, grapes, pineapples , raisins Introducing [facility]! Mario Roche introduces [employer] patient portal. Now you can access parts of your medical record, email your doctor's office, and request medication refills online. 1. In your internet browser, go to [email or web address] 2. Click on the First Time User? Click Here link in the Sign In box. You will see the New Member Sign Up page. 3. Enter your Pomelo Access Code exactly as it appears below. You will not need to use this code after youve completed the sign-up process. If you do not sign up before the expiration date, you must request a new code. · Pomelo Access Code: [email or web address] Expires: 8/27/2018 10:06 AM 
 
4. Enter the last four digits of your Social Security Number (xxxx) and Date of Birth (mm/dd/yyyy) as indicated and click Submit. You will be taken to the next sign-up page. 5. Create a Pomelo ID. This will be your Pomelo login ID and cannot be changed, so think of one that is secure and easy to remember. 6. Create a Pomelo password. You can change your password at any time. 7. Enter your Password Reset Question and Answer. This can be used at a later time if you forget your password. 8. Enter your e-mail address. You will receive e-mail notification when new information is available in [address] 9. Click Sign Up. You can now view and download portions of your medical record. 10. Click the Download Summary menu link to download a portable copy of your medical information. If you have questions, please visit the Frequently Asked Questions section of the Pomelo website. Remember, Pomelo is NOT to be used for urgent needs. For medical emergencies, dial 911. Now available from your iPhone and Android! Please provide this summary of care documentation to your next provider. Your primary care clinician is listed as Mali Davidson III. If you have any questions after today's visit, please call [phone].

## 2018-05-29 NOTE — PROGRESS NOTES
Anisha Zambrano is a 71 y.o. male here for   Chief Complaint   Patient presents with    Diabetes     Upcoming appts:  Urologist in August 2018  Dermatologist July 2018    Functional glucose monitor and record keeping system? - yes  Eye exam within last year? - last month, Dr. Maricarmen Hernández exam within last year? - on file    1. Have you been to the ER, urgent care clinic since your last visit? Hospitalized since your last visit? -no    2. Have you seen or consulted any other health care providers outside of the [facility] since your last visit? Include any pap smears or colon screening. -PCP      Lab Results   Component Value Date/Time    Hemoglobin A1c, External 7.2 02/08/2018     Wt Readings from Last 3 Encounters:   05/29/18 162 lb 12.8 oz (73.8 kg)   02/16/18 165 lb 14.4 oz (75.3 kg)   08/24/17 166 lb 6.4 oz (75.5 kg)     Temp Readings from Last 3 Encounters:   05/29/18 97.6 °F (36.4 °C) (Oral)   02/16/18 98 °F (36.7 °C) (Oral)   08/24/17 97 °F (36.1 °C) (Oral)     BP Readings from Last 3 Encounters:   05/29/18 125/70   02/16/18 124/67   08/24/17 138/67     Pulse Readings from Last 3 Encounters:   05/29/18 79   02/16/18 90   08/24/17 75

## 2018-05-29 NOTE — PROGRESS NOTES
HISTORY OF PRESENT ILLNESS  Luz Zambrano is a 71 y.o. male. HPI  Patient here for third visit  After  initial visit of Type 2 diabetes mellitus from Feb 16 2018       Lost 3 lbs    Kept log very well     SUGARS ARE IN GOOD RANGE    He had labs at pcp          Referred : by pcp    H/o diabetes for 10  years     Current A1C is 8.7 %   From   March 8 2017  and symptoms/problems include fluctuant sugars  and polyuria     Current diabetic medications include glipizide xr 2.5 mg a day and metformin. Current monitoring regimen: home blood tests - daily  Home blood sugar records: trend: fluctuating a lot  Any episodes of hypoglycemia? no    Weight trend: fluctuating a lot  Prior visit with dietician: no  Current diet: \"unhealthy\" diet in general  Current exercise: no regular exercise    Known diabetic complications: none  Cardiovascular risk factors: dyslipidemia, diabetes mellitus, obesity, male gender, hypertension, stress    Eye exam current (within one year): yes  AISHA: no     Past Medical History:   Diagnosis Date    DM (diabetes mellitus) ([facility] [address]      Past Surgical History:   Procedure Laterality Date    HX CYST REMOVAL  1971    HX HEMORRHOIDECTOMY      HX TONSILLECTOMY  1950     Current Outpatient Prescriptions   Medication Sig    pravastatin (PRAVACHOL) 20 mg tablet Take 1 Tab by mouth nightly.  SITagliptin (JANUVIA) 100 mg tablet Take 1 Tab by mouth daily.  metFORMIN (GLUCOPHAGE) 500 mg tablet Take 2 Tabs by mouth two (2) times daily (with meals). Note the increase    glipiZIDE (GLUCOTROL) 5 mg tablet Take 1 tab 20 minutes before breakfast, and 1 tab 20 minutes before dinner. Stop XR    TRUEPLUS LANCETS 28 gauge misc TEST FOUR TIMES DAILY    glucose blood VI test strips (TRUE METRIX GLUCOSE TEST STRIP) strip Test 4 times daily. Dx code E11.65    cetirizine (ZYRTEC) 10 mg tablet Take  by mouth. No current facility-administered medications for this visit.         Review of Systems   Constitutional: Negative. HENT: Negative. Eyes: Negative for pain and redness. Respiratory: Negative. Cardiovascular: Negative for chest pain, palpitations and leg swelling. Gastrointestinal: Negative. Negative for constipation. Genitourinary: Negative. Musculoskeletal: Negative for myalgias. Skin: Negative. Neurological: Negative. Endo/Heme/Allergies: Negative. Psychiatric/Behavioral: Negative for depression and memory loss. The patient does not have insomnia. Physical Exam   Constitutional: He is oriented to person, place, and time. He appears well-developed and well-nourished. HENT:   Head: Normocephalic. Eyes: Conjunctivae and EOM are normal. Pupils are equal, round, and reactive to light. Neck: Normal range of motion. Neck supple. No JVD present. No tracheal deviation present. No thyromegaly present. Cardiovascular: Normal rate, regular rhythm and normal heart sounds. Pulmonary/Chest: Effort normal and breath sounds normal.   Abdominal: Soft. Bowel sounds are normal.   Musculoskeletal: Normal range of motion. Lymphadenopathy:     He has no cervical adenopathy. Neurological: He is alert and oriented to person, place, and time. He has normal reflexes. Skin: Skin is warm. Psychiatric: He has a normal mood and affect. 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  Type 2 DM uncontrolled : a1c is    6.9 % From   May 2018     Compared to  7.2 %    From   Nov 2017  Compared to   7 %     From    August 16th 2017     Compared to   8.7 %    From march 8 2017   Beautiful control    encouraging him to call office for assitance with  januvia while in [place]ut [place]   Stay on  metformin  500 mg  To two pills twice a day with meals   januvia 100 mg a day   Stay  on  Glipizide 5 mg twice  A day , twenty minutes before b-fast and dinner   Stopped  glipizide ER     Patient is advised to check blood sugars 1-2 times daily by rotation method. reviewed medications and side effects in detail  lab results and schedule of future lab studies reviewed with patient      2. Hypoglycemia :  Educated on treating the hypoglycemia. 3. HTN : not on any ACEI or ARB, does not have proteinuria . Patient is educated about importance of compliance with anti-hypertensives especially ARB/ACEI    4. Dyslipidemia : lipids are great LDL is  64 ,  on pravachol 20 mg at night   Patient is educated about benefits and adverse effects of statins and explained how benefits outweigh risk.         5. use of aspirin to prevent MI and TIA's discussed      HE WANTED TO COME IN A YEAR ONLY  LABS AT PCP   > 50 % of time is spent on counseling   Patient voiced understanding her plan of care

## 2019-01-30 RX ORDER — METFORMIN HYDROCHLORIDE 500 MG/1
TABLET ORAL
Qty: 360 TAB | Refills: 1 | Status: SHIPPED | OUTPATIENT
Start: 2019-01-30 | End: 2019-08-01 | Stop reason: SDUPTHER

## 2019-04-11 RX ORDER — GLIPIZIDE 5 MG/1
TABLET ORAL
Qty: 180 TAB | Refills: 3 | Status: SHIPPED | OUTPATIENT
Start: 2019-04-11 | End: 2020-04-01

## 2019-05-30 ENCOUNTER — OFFICE VISIT (OUTPATIENT)
Dept: ENDOCRINOLOGY | Age: 74
End: 2019-05-30

## 2019-05-30 VITALS
DIASTOLIC BLOOD PRESSURE: 56 MMHG | HEART RATE: 74 BPM | WEIGHT: 163.5 LBS | HEIGHT: 67 IN | SYSTOLIC BLOOD PRESSURE: 131 MMHG | TEMPERATURE: 97.5 F | BODY MASS INDEX: 25.66 KG/M2 | RESPIRATION RATE: 18 BRPM | OXYGEN SATURATION: 98 %

## 2019-05-30 DIAGNOSIS — I10 ESSENTIAL HYPERTENSION: ICD-10-CM

## 2019-05-30 DIAGNOSIS — E78.2 MIXED HYPERLIPIDEMIA: ICD-10-CM

## 2019-05-30 LAB — HBA1C MFR BLD HPLC: 7.4 %

## 2019-05-30 RX ORDER — PRAVASTATIN SODIUM 20 MG/1
20 TABLET ORAL
Qty: 90 TAB | Refills: 4 | Status: SHIPPED | OUTPATIENT
Start: 2019-05-30 | End: 2020-07-30 | Stop reason: SDUPTHER

## 2019-05-30 RX ORDER — INSULIN PUMP SYRINGE, 3 ML
EACH MISCELLANEOUS
Qty: 1 KIT | Refills: 0 | Status: SHIPPED | OUTPATIENT
Start: 2019-05-30 | End: 2019-06-03 | Stop reason: SDUPTHER

## 2019-05-30 RX ORDER — LANCETS 33 GAUGE
EACH MISCELLANEOUS
Qty: 100 LANCET | Refills: 6 | Status: SHIPPED | OUTPATIENT
Start: 2019-05-30 | End: 2021-08-09

## 2019-05-30 NOTE — PROGRESS NOTES
1. Have you been to the ER, urgent care clinic since your last visit? No  Hospitalized since your last visit? No    2. Have you seen or consulted any other health care providers outside of the [address] [place] [address] since your last visit? Include any pap smears or colon screening. No     Wt Readings from Last 3 Encounters:   05/30/19 163 lb 8 oz (74.2 kg)   05/29/18 162 lb 12.8 oz (73.8 kg)   02/16/18 165 lb 14.4 oz (75.3 kg)     Temp Readings from Last 3 Encounters:   05/30/19 97.5 °F (36.4 °C) (Oral)   05/29/18 97.6 °F (36.4 °C) (Oral)   02/16/18 98 °F (36.7 °C) (Oral)     BP Readings from Last 3 Encounters:   05/30/19 131/56   05/29/18 125/70   02/16/18 124/67     Pulse Readings from Last 3 Encounters:   05/30/19 74   05/29/18 79   02/16/18 90     Lab Results   Component Value Date/Time    Hemoglobin A1c, External 7.9 02/13/2019     Patient does not have a meter today.

## 2019-05-30 NOTE — PROGRESS NOTES
HISTORY OF PRESENT ILLNESS  Shyam Zambrano is a 68 y.o. male. HPI  Patient here for f/u   After  visit of Type 2 diabetes mellitus from Feb 16 2018       Weight is stable   Not very diet compliant   No log   Pt seems more interactive and is being honest about the TLC        Old history :     Lost 3 lbs    Kept log very well     SUGARS ARE IN GOOD RANGE  He had labs at pcp          Referred : by pcp    H/o diabetes for 10  years     Current A1C is 8.7 %   From   March 8 2017  and symptoms/problems include fluctuant sugars  and polyuria     Current diabetic medications include glipizide xr 2.5 mg a day and metformin. Current monitoring regimen: home blood tests - daily  Home blood sugar records: trend: fluctuating a lot  Any episodes of hypoglycemia? no    Weight trend: fluctuating a lot  Prior visit with dietician: no  Current diet: \"unhealthy\" diet in general  Current exercise: no regular exercise    Known diabetic complications: none  Cardiovascular risk factors: dyslipidemia, diabetes mellitus, obesity, male gender, hypertension, stress    Eye exam current (within one year): yes  AISHA: no     Past Medical History:   Diagnosis Date    DM (diabetes mellitus) ([facility] [address]      Past Surgical History:   Procedure Laterality Date    HX CYST REMOVAL  1971    HX HEMORRHOIDECTOMY      HX TONSILLECTOMY  1950     Current Outpatient Medications   Medication Sig    glipiZIDE (GLUCOTROL) 5 mg tablet TAKE 1 TABLET BY MOUTH 20 MINUTES BEFORE BREAKFAST AND DINNER. STOP XR.  metFORMIN (GLUCOPHAGE) 500 mg tablet TAKE 2 TABLETS BY MOUTH 2 TIMES DAILY WITH MEALS    JANUVIA 100 mg tablet TAKE 1 TAB BY MOUTH DAILY.  pravastatin (PRAVACHOL) 20 mg tablet Take 1 Tab by mouth nightly.  TRUEPLUS LANCETS 28 gauge misc TEST FOUR TIMES DAILY    glucose blood VI test strips (TRUE METRIX GLUCOSE TEST STRIP) strip Test 4 times daily. Dx code E11.65    cetirizine (ZYRTEC) 10 mg tablet Take  by mouth.      No current facility-administered medications for this visit. Review of Systems   Constitutional: Negative. HENT: Negative. Eyes: Negative for pain and redness. Respiratory: Negative. Cardiovascular: Negative for chest pain, palpitations and leg swelling. Gastrointestinal: Negative. Negative for constipation. Genitourinary: Negative. Musculoskeletal: Negative for myalgias. Skin: Negative. Neurological: Negative. Endo/Heme/Allergies: Negative. Psychiatric/Behavioral: Negative for depression and memory loss. The patient does not have insomnia. Physical Exam   Constitutional: He is oriented to person, place, and time. He appears well-developed and well-nourished. HENT:   Head: Normocephalic. Eyes: Conjunctivae and EOM are normal. Pupils are equal, round, and reactive to light. Neck: Normal range of motion. Neck supple. No JVD present. No tracheal deviation present. No thyromegaly present. Cardiovascular: Normal rate, regular rhythm and normal heart sounds. Pulmonary/Chest: Effort normal and breath sounds normal.   Abdominal: Soft. Bowel sounds are normal.   Musculoskeletal: Normal range of motion. Lymphadenopathy:     He has no cervical adenopathy. Neurological: He is alert and oriented to person, place, and time. He has normal reflexes. Skin: Skin is warm. Psychiatric: He has a normal mood and affect.    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 Type 2 DM uncontrolled : a1c is  7.4 %   From     May 2019       Compared to  7.9 %    From   Feb 2019    Compared to    6.9 %    From   May 2018     Compared to  7.2 %    From   Nov 2017  Compared to   7 %     From    August 16th 2017     Compared to   8.7 %    From march 8 2017       Slightly losing control   Not diet compliant     encouraging him to call office for assitance with  Marisabel Wong while in [place]   Stay on  metformin  500 mg  To two pills twice a day with meals   januvia 100 mg a day   Stay  on  Glipizide 5 mg twice  A day , twenty minutes before b-fast and dinner   Stopped  glipizide ER       Explained to him about the pancreatic function and how that can deteriorate with not following the diet and also especially with usage of glipizide  He definitely agrees to do better control of diet    Patient is advised to check blood sugars 1-2 times daily by rotation method. reviewed medications and side effects in detail  lab results and schedule of future lab studies reviewed with patient      2. Hypoglycemia :  Educated on treating the hypoglycemia. 3. HTN : controlled,  not on any ACEI or ARB, does not have proteinuria . Patient is educated about importance of compliance with anti-hypertensives especially ARB/ACEI    4. Dyslipidemia : lipids are great LDL is  64 ,  on pravachol 20 mg at night   Patient is educated about benefits and adverse effects of statins and explained how benefits outweigh risk.         5. use of aspirin to prevent MI and TIA's discussed      Follow-up in 6 months        > 50 % of time is spent on counseling   Patient voiced understanding her plan of care

## 2019-05-30 NOTE — LETTER
6/2/19 Patient: Ernestine Waite YOB: 1945 Date of Visit: 5/30/2019 Caryle Plover, [id number] [facility] [address] [address] [place] [address] VIA Facsimile: [phone] Dear Caryle Plover, MD, Thank you for referring Mr. Fabien Zambrano to [address] [place] [address] for evaluation. My notes for this consultation are attached. If you have questions, please do not hesitate to call me. I look forward to following your patient along with you. Sincerely, Ruth Jacobson MD

## 2019-06-03 ENCOUNTER — TELEPHONE (OUTPATIENT)
Dept: ENDOCRINOLOGY | Age: 74
End: 2019-06-03

## 2019-06-03 RX ORDER — INSULIN PUMP SYRINGE, 3 ML
EACH MISCELLANEOUS
Qty: 1 KIT | Refills: 0 | Status: SHIPPED | OUTPATIENT
Start: 2019-06-03 | End: 2021-08-09

## 2019-06-03 NOTE — TELEPHONE ENCOUNTER
----- Message from Dominique Perry sent at 5/31/2019  2:15 PM EDT -----  Regarding: Dr. Denia Gordon: [phone]  Pt requesting a new test strip meter and strips.  [facility] pharmacy located [address] [place] [address] [phone]

## 2019-08-01 RX ORDER — METFORMIN HYDROCHLORIDE 500 MG/1
TABLET ORAL
Qty: 360 TAB | Refills: 1 | Status: SHIPPED | OUTPATIENT
Start: 2019-08-01 | End: 2020-02-03

## 2019-11-04 ENCOUNTER — HOSPITAL ENCOUNTER (OUTPATIENT)
Dept: LAB | Age: 74
Discharge: HOME | End: 2019-11-04

## 2019-11-04 DIAGNOSIS — E11.8 TYPE II DIABETES MELLITUS WITH MANIFESTATIONS (HCC): ICD-10-CM

## 2019-11-04 DIAGNOSIS — E11.65 UNCONTROLLED TYPE 2 DIABETES MELLITUS WITH HYPERGLYCEMIA (HCC): ICD-10-CM

## 2019-11-04 DIAGNOSIS — E78.2 MIXED HYPERLIPIDEMIA: ICD-10-CM

## 2019-11-04 DIAGNOSIS — I10 ESSENTIAL HYPERTENSION, MALIGNANT: ICD-10-CM

## 2019-11-04 LAB
ALBUMIN SERPL-MCNC: 4 G/DL (ref 3.5–5)
ALBUMIN/GLOB SERPL: 1.3 {RATIO} (ref 1.1–2.2)
ALP SERPL-CCNC: 66 U/L (ref 45–117)
ALT SERPL-CCNC: 24 U/L (ref 12–78)
ANION GAP SERPL CALC-SCNC: 1 MMOL/L (ref 5–15)
AST SERPL-CCNC: 14 U/L (ref 15–37)
BILIRUB SERPL-MCNC: 0.7 MG/DL (ref 0.2–1)
BUN SERPL-MCNC: 22 MG/DL (ref 6–20)
BUN/CREAT SERPL: 22 (ref 12–20)
CALCIUM SERPL-MCNC: 9.2 MG/DL (ref 8.5–10.1)
CHLORIDE SERPL-SCNC: 107 MMOL/L (ref 97–108)
CHOLEST SERPL-MCNC: 136 MG/DL
CO2 SERPL-SCNC: 32 MMOL/L (ref 21–32)
CREAT SERPL-MCNC: 0.99 MG/DL (ref 0.7–1.3)
CREAT UR-MCNC: 167 MG/DL
GLOBULIN SER CALC-MCNC: 3 G/DL (ref 2–4)
GLUCOSE SERPL-MCNC: 100 MG/DL (ref 65–100)
HDLC SERPL-MCNC: 47 MG/DL
HDLC SERPL: 2.9 {RATIO} (ref 0–5)
LDLC SERPL CALC-MCNC: 78.6 MG/DL (ref 0–100)
LIPID PROFILE,FLP: NORMAL
MICROALBUMIN UR-MCNC: 5.58 MG/DL
MICROALBUMIN/CREAT UR-RTO: 33 MG/G (ref 0–30)
POTASSIUM SERPL-SCNC: 4.6 MMOL/L (ref 3.5–5.1)
PROT SERPL-MCNC: 7 G/DL (ref 6.4–8.2)
SODIUM SERPL-SCNC: 140 MMOL/L (ref 136–145)
TRIGL SERPL-MCNC: 52 MG/DL (ref ?–150)
VLDLC SERPL CALC-MCNC: 10.4 MG/DL

## 2019-11-04 PROCEDURE — 82043 UR ALBUMIN QUANTITATIVE: CPT

## 2019-11-04 PROCEDURE — 80061 LIPID PANEL: CPT

## 2019-11-04 PROCEDURE — 80053 COMPREHEN METABOLIC PANEL: CPT

## 2019-11-04 PROCEDURE — 83036 HEMOGLOBIN GLYCOSYLATED A1C: CPT

## 2019-11-05 LAB
EST. AVERAGE GLUCOSE BLD GHB EST-MCNC: 166 MG/DL
HBA1C MFR BLD: 7.4 % (ref 4.2–6.3)

## 2019-11-27 ENCOUNTER — OFFICE VISIT (OUTPATIENT)
Dept: ENDOCRINOLOGY | Age: 74
End: 2019-11-27

## 2019-11-27 VITALS
RESPIRATION RATE: 18 BRPM | DIASTOLIC BLOOD PRESSURE: 62 MMHG | HEART RATE: 76 BPM | WEIGHT: 166.2 LBS | OXYGEN SATURATION: 100 % | BODY MASS INDEX: 26.09 KG/M2 | HEIGHT: 67 IN | TEMPERATURE: 96.6 F | SYSTOLIC BLOOD PRESSURE: 152 MMHG

## 2019-11-27 DIAGNOSIS — I10 ESSENTIAL HYPERTENSION: ICD-10-CM

## 2019-11-27 DIAGNOSIS — E78.2 MIXED HYPERLIPIDEMIA: ICD-10-CM

## 2019-11-27 NOTE — LETTER
11/30/19 Patient: Nathaniel Méndez YOB: 1945 Date of Visit: 11/27/2019 Duke Herrera, [address] [facility] [address] [address] [address] VIA Facsimile: [phone] Dear Duke Herrera MD, Thank you for referring Mr. Chaya Wilks Form to [id number] [place] [address] for evaluation. My notes for this consultation are attached. If you have questions, please do not hesitate to call me. I look forward to following your patient along with you. Sincerely, Alexandra Echols MD

## 2019-11-27 NOTE — PROGRESS NOTES
HISTORY OF PRESENT ILLNESS  Nory Zambrano is a 69 y.o. male. HPI  Patient here for f/u   After  visit of Type 2 diabetes mellitus from May 2019     Gained 3 lbs   He does well but for diet sodas   Relatively does good with diet           Old history :    Weight is stable   Not very diet compliant   No log   Pt seems more interactive and is being honest about the TLC      Old history :     Lost 3 lbs    Kept log very well   SUGARS ARE IN GOOD RANGE  He had labs at pcp        Referred : by pcp    H/o diabetes for 10  years     Current A1C is 8.7 %   From   March 8 2017  and symptoms/problems include fluctuant sugars  and polyuria     Current diabetic medications include glipizide xr 2.5 mg a day and metformin. Current monitoring regimen: home blood tests - daily  Home blood sugar records: trend: fluctuating a lot  Any episodes of hypoglycemia? no    Weight trend: fluctuating a lot  Prior visit with dietician: no  Current diet: \"unhealthy\" diet in general  Current exercise: no regular exercise    Known diabetic complications: none  Cardiovascular risk factors: dyslipidemia, diabetes mellitus, obesity, male gender, hypertension, stress    Eye exam current (within one year): yes  AISHA: no     Past Medical History:   Diagnosis Date    DM (diabetes mellitus) ([facility] [address]      Past Surgical History:   Procedure Laterality Date    HX CYST REMOVAL  1971    HX HEMORRHOIDECTOMY      HX TONSILLECTOMY  1950     Current Outpatient Medications   Medication Sig    metFORMIN (GLUCOPHAGE) 500 mg tablet TAKE 2 TABLETS BY MOUTH 2 TIMES DAILY WITH MEALS    Blood-Glucose Meter (ONETOUCH VERIO IQ METER) monitoring kit Dispense 1 meter kit. Test 2 times daily. Dx code E11.65    glucose blood VI test strips (ONETOUCH VERIO) strip Test 2 times daily. Dx code E11.65    pravastatin (PRAVACHOL) 20 mg tablet Take 1 Tab by mouth nightly.  lancets (ONE TOUCH DELICA) 33 gauge misc Test 2 times daily.  Dx code E11.65    glipiZIDE (GLUCOTROL) 5 mg tablet TAKE 1 TABLET BY MOUTH 20 MINUTES BEFORE BREAKFAST AND DINNER. STOP XR.    JANUVIA 100 mg tablet TAKE 1 TAB BY MOUTH DAILY.  TRUEPLUS LANCETS 28 gauge misc TEST FOUR TIMES DAILY    glucose blood VI test strips (TRUE METRIX GLUCOSE TEST STRIP) strip Test 4 times daily. Dx code E11.65    cetirizine (ZYRTEC) 10 mg tablet Take  by mouth. No current facility-administered medications for this visit. Review of Systems   Constitutional: Negative. HENT: Negative. Eyes: Negative for pain and redness. Respiratory: Negative. Cardiovascular: Negative for chest pain, palpitations and leg swelling. Gastrointestinal: Negative. Negative for constipation. Genitourinary: Negative. Musculoskeletal: Negative for myalgias. Skin: Negative. Neurological: Negative. Endo/Heme/Allergies: Negative. Psychiatric/Behavioral: Negative for depression and memory loss. The patient does not have insomnia. Physical Exam   Constitutional: He is oriented to person, place, and time. He appears well-developed and well-nourished. HENT:   Head: Normocephalic. Eyes: Conjunctivae and EOM are normal. Pupils are equal, round, and reactive to light. Neck: Normal range of motion. Neck supple. No JVD present. No tracheal deviation present. No thyromegaly present. Cardiovascular: Normal rate, regular rhythm and normal heart sounds. Pulmonary/Chest: Effort normal and breath sounds normal.   Abdominal: Soft. Bowel sounds are normal.   Musculoskeletal: Normal range of motion. Lymphadenopathy:     He has no cervical adenopathy. Neurological: He is alert and oriented to person, place, and time. He has normal reflexes. Skin: Skin is warm. Psychiatric: He has a normal mood and affect.    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    Lab Results   Component Value Date/Time    Hemoglobin A1c 7.4 (H) 11/04/2019 11:02 AM    Hemoglobin A1c, External 7.9 02/13/2019    Hemoglobin A1c, External 6.9 05/15/2018    Hemoglobin A1c, External 7.2 02/08/2018    Glucose 100 11/04/2019 11:02 AM    Microalbumin/Creat ratio (mg/g creat) 33 (H) 11/04/2019 11:02 AM    Microalbumin,urine random 5.58 11/04/2019 11:02 AM    LDL, calculated 78.6 11/04/2019 11:02 AM    Creatinine 0.99 11/04/2019 11:02 AM      Lab Results   Component Value Date/Time    Cholesterol, total 136 11/04/2019 11:02 AM    HDL Cholesterol 47 11/04/2019 11:02 AM    LDL, calculated 78.6 11/04/2019 11:02 AM    LDL-C, External 66 02/13/2019    Triglyceride 52 11/04/2019 11:02 AM    CHOL/HDL Ratio 2.9 11/04/2019 11:02 AM       ASSESSMENT and PLAN    1.  Type 2 DM uncontrolled : a1c is 7.4 %     From   Nov 2019   Compared to    7.4 %   From     May 2019       Compared to  7.9 %    From   Feb 2019    Compared to    6.9 %    From   May 2018     Compared to  7.2 %    From   Nov 2017  Compared to   7 %     From    August 16th 2017     Compared to   8.7 %    From march 8 2017       plataued  losing control   Improved  diet compliant   encouraging him to call office for assitance with  Whit Crawford while in [place]   Stay on  metformin  500 mg  To two pills twice a day with meals   januvia 100 mg a day   Stay  on  Glipizide 5 mg twice  A day , twenty minutes before b-fast and dinner   Explained about the limitations on glipizide     Explained to him about the pancreatic function and how that can deteriorate with not following the diet and also especially with usage of glipizide  He definitely agrees to do better control of diet    Patient is advised to check blood sugars 1-2 times daily by rotation method. reviewed medications and side effects in detail  lab results and schedule of future lab studies reviewed with patient      2. Hypoglycemia :  Educated on treating the hypoglycemia. 3. HTN : controlled,  not on any ACEI or ARB, does not have proteinuria . Patient is educated about importance of compliance with anti-hypertensives especially ARB/ACEI    4. Dyslipidemia : lipids are good ,  on pravachol 20 mg at night   Patient is educated about benefits and adverse effects of statins and explained how benefits outweigh risk.         5. use of aspirin to prevent MI and TIA's discussed      Follow-up in 6 months        > 50 % of time is spent on counseling   Patient voiced understanding her plan of care

## 2019-11-27 NOTE — PROGRESS NOTES
1. Have you been to the ER, urgent care clinic since your last visit? No  Hospitalized since your last visit? No    2. Have you seen or consulted any other health care providers outside of the [address] [place] [address] since your last visit? Include any pap smears or colon screening. No    Wt Readings from Last 3 Encounters:   11/27/19 166 lb 3.2 oz (75.4 kg)   05/30/19 163 lb 8 oz (74.2 kg)   05/29/18 162 lb 12.8 oz (73.8 kg)     Temp Readings from Last 3 Encounters:   11/27/19 96.6 °F (35.9 °C) (Oral)   05/30/19 97.5 °F (36.4 °C) (Oral)   05/29/18 97.6 °F (36.4 °C) (Oral)     BP Readings from Last 3 Encounters:   11/27/19 152/62   05/30/19 131/56   05/29/18 125/70     Pulse Readings from Last 3 Encounters:   11/27/19 76   05/30/19 74   05/29/18 79     Lab Results   Component Value Date/Time    Hemoglobin A1c 7.4 (H) 11/04/2019 11:02 AM    Hemoglobin A1c (POC) 7.4 05/30/2019 09:06 AM    Hemoglobin A1c, External 7.9 02/13/2019     Patient does not have a meter today.

## 2020-02-03 RX ORDER — METFORMIN HYDROCHLORIDE 500 MG/1
TABLET ORAL
Qty: 360 TAB | Refills: 1 | Status: SHIPPED | OUTPATIENT
Start: 2020-02-03 | End: 2020-07-27 | Stop reason: SDUPTHER

## 2020-02-25 LAB
CREATININE, EXTERNAL: 0.89
HBA1C MFR BLD HPLC: 8.9 %
LDL-C, EXTERNAL: 86
PSA, EXTERNAL: 4.4

## 2020-04-01 RX ORDER — GLIPIZIDE 5 MG/1
TABLET ORAL
Qty: 180 TAB | Refills: 3 | Status: SHIPPED | OUTPATIENT
Start: 2020-04-01 | End: 2020-08-04 | Stop reason: SDUPTHER

## 2020-05-19 ENCOUNTER — TELEPHONE (OUTPATIENT)
Dept: ENDOCRINOLOGY | Age: 75
End: 2020-05-19

## 2020-05-19 DIAGNOSIS — I10 ESSENTIAL HYPERTENSION: ICD-10-CM

## 2020-05-19 DIAGNOSIS — E78.2 MIXED HYPERLIPIDEMIA: ICD-10-CM

## 2020-07-27 RX ORDER — METFORMIN HYDROCHLORIDE 500 MG/1
TABLET ORAL
Qty: 360 TAB | Refills: 1 | Status: SHIPPED | OUTPATIENT
Start: 2020-07-27 | End: 2021-01-18

## 2020-07-30 ENCOUNTER — HOSPITAL ENCOUNTER (OUTPATIENT)
Dept: LAB | Age: 75
Discharge: HOME | End: 2020-07-30
Payer: MEDICARE

## 2020-07-30 PROCEDURE — 82043 UR ALBUMIN QUANTITATIVE: CPT

## 2020-07-30 PROCEDURE — 80053 COMPREHEN METABOLIC PANEL: CPT

## 2020-07-30 PROCEDURE — 36415 COLL VENOUS BLD VENIPUNCTURE: CPT

## 2020-07-30 PROCEDURE — 83036 HEMOGLOBIN GLYCOSYLATED A1C: CPT

## 2020-07-30 PROCEDURE — 80061 LIPID PANEL: CPT

## 2020-07-30 RX ORDER — PRAVASTATIN SODIUM 20 MG/1
20 TABLET ORAL
Qty: 90 TAB | Refills: 4 | Status: SHIPPED | OUTPATIENT
Start: 2020-07-30 | End: 2020-08-04 | Stop reason: SDUPTHER

## 2020-07-31 LAB
ALBUMIN SERPL-MCNC: 4.6 G/DL (ref 3.7–4.7)
ALBUMIN/CREAT UR: 15 MG/G CREAT (ref 0–29)
ALBUMIN/GLOB SERPL: 2.2 {RATIO} (ref 1.2–2.2)
ALP SERPL-CCNC: 67 IU/L (ref 39–117)
ALT SERPL-CCNC: 20 IU/L (ref 0–44)
AST SERPL-CCNC: 24 IU/L (ref 0–40)
BILIRUB SERPL-MCNC: 0.6 MG/DL (ref 0–1.2)
BUN SERPL-MCNC: 22 MG/DL (ref 8–27)
BUN/CREAT SERPL: 21 (ref 10–24)
CALCIUM SERPL-MCNC: 9.6 MG/DL (ref 8.6–10.2)
CHLORIDE SERPL-SCNC: 101 MMOL/L (ref 96–106)
CHOLEST SERPL-MCNC: 139 MG/DL (ref 100–199)
CO2 SERPL-SCNC: 25 MMOL/L (ref 20–29)
CREAT SERPL-MCNC: 1.05 MG/DL (ref 0.76–1.27)
CREAT UR-MCNC: 347.8 MG/DL
EST. AVERAGE GLUCOSE BLD GHB EST-MCNC: 169 MG/DL
GLOBULIN SER CALC-MCNC: 2.1 G/DL (ref 1.5–4.5)
GLUCOSE SERPL-MCNC: 164 MG/DL (ref 65–99)
HBA1C MFR BLD: 7.5 % (ref 4.8–5.6)
HDLC SERPL-MCNC: 48 MG/DL
INTERPRETATION, 910389: NORMAL
LDLC SERPL CALC-MCNC: 76 MG/DL (ref 0–99)
Lab: NORMAL
MICROALBUMIN UR-MCNC: 52.2 UG/ML
POTASSIUM SERPL-SCNC: 4.9 MMOL/L (ref 3.5–5.2)
PROT SERPL-MCNC: 6.7 G/DL (ref 6–8.5)
SODIUM SERPL-SCNC: 141 MMOL/L (ref 134–144)
TRIGL SERPL-MCNC: 76 MG/DL (ref 0–149)
VLDLC SERPL CALC-MCNC: 15 MG/DL (ref 5–40)

## 2020-08-04 ENCOUNTER — OFFICE VISIT (OUTPATIENT)
Dept: ENDOCRINOLOGY | Age: 75
End: 2020-08-04
Payer: MEDICARE

## 2020-08-04 VITALS
BODY MASS INDEX: 25.43 KG/M2 | SYSTOLIC BLOOD PRESSURE: 131 MMHG | DIASTOLIC BLOOD PRESSURE: 68 MMHG | TEMPERATURE: 97.2 F | RESPIRATION RATE: 14 BRPM | WEIGHT: 162 LBS | HEART RATE: 75 BPM | HEIGHT: 67 IN

## 2020-08-04 DIAGNOSIS — E78.2 MIXED HYPERLIPIDEMIA: ICD-10-CM

## 2020-08-04 DIAGNOSIS — I10 ESSENTIAL HYPERTENSION: ICD-10-CM

## 2020-08-04 PROCEDURE — 1101F PT FALLS ASSESS-DOCD LE1/YR: CPT | Performed by: INTERNAL MEDICINE

## 2020-08-04 PROCEDURE — G8752 SYS BP LESS 140: HCPCS | Performed by: INTERNAL MEDICINE

## 2020-08-04 PROCEDURE — G8419 CALC BMI OUT NRM PARAM NOF/U: HCPCS | Performed by: INTERNAL MEDICINE

## 2020-08-04 PROCEDURE — 99214 OFFICE O/P EST MOD 30 MIN: CPT | Performed by: INTERNAL MEDICINE

## 2020-08-04 PROCEDURE — G8536 NO DOC ELDER MAL SCRN: HCPCS | Performed by: INTERNAL MEDICINE

## 2020-08-04 PROCEDURE — G8510 SCR DEP NEG, NO PLAN REQD: HCPCS | Performed by: INTERNAL MEDICINE

## 2020-08-04 PROCEDURE — 3051F HG A1C>EQUAL 7.0%<8.0%: CPT | Performed by: INTERNAL MEDICINE

## 2020-08-04 PROCEDURE — 3017F COLORECTAL CA SCREEN DOC REV: CPT | Performed by: INTERNAL MEDICINE

## 2020-08-04 PROCEDURE — 2022F DILAT RTA XM EVC RTNOPTHY: CPT | Performed by: INTERNAL MEDICINE

## 2020-08-04 PROCEDURE — G8754 DIAS BP LESS 90: HCPCS | Performed by: INTERNAL MEDICINE

## 2020-08-04 PROCEDURE — G8427 DOCREV CUR MEDS BY ELIG CLIN: HCPCS | Performed by: INTERNAL MEDICINE

## 2020-08-04 RX ORDER — ASPIRIN 81 MG/1
81 TABLET ORAL DAILY
COMMUNITY

## 2020-08-04 RX ORDER — GLIPIZIDE 5 MG/1
TABLET ORAL
Qty: 360 TAB | Refills: 3 | Status: SHIPPED | OUTPATIENT
Start: 2020-08-04 | End: 2021-02-08 | Stop reason: SDUPTHER

## 2020-08-04 RX ORDER — PRAVASTATIN SODIUM 20 MG/1
20 TABLET ORAL
Qty: 90 TAB | Refills: 4 | Status: SHIPPED | OUTPATIENT
Start: 2020-08-04 | End: 2021-02-08 | Stop reason: SDUPTHER

## 2020-08-04 NOTE — PATIENT INSTRUCTIONS
Check blood sugars only twice a day by rotation as follows First day - before b-fast and - before lunch Second day- before dinner and  before bed time After 2 days go back to same rotation 
 
 
------------------------------------------------------------------------------------------------- Stay  on pravachol 20 mg at night Stay  on  metformin  500 mg to 2 pills  twice a day with meals  
 
 
continue  On januvia 100 mg a day Increase    Glipizide 5 mg to 2 pills   twice  A day , twenty minutes before b-fast and dinner Stop glipizide ER  
 
 
--------------------------------------------------------------------------------------------------------- Do not skip meals Do not eat in between meals Reduce carbs- pasta, rice, potatoes, bread Do not drink juices or sodas Donot eat peanut butter Do not eat sugar free cookies and cakes Do not eat peaches , oranges, grapes, pineapples , raisins

## 2020-08-04 NOTE — PROGRESS NOTES
HISTORY OF PRESENT ILLNESS  Arun Zambrano is a 69 y.o. male. HPI  Patient here for f/u   After  visit of Type 2 diabetes mellitus from November 2019     A gap of   Almost 10 months   Lost 4 lbs     Started on ASA  In feb 2020   In MArch 2020, he stopped  Soft drinks           Old history      Gained 3 lbs   He does well but for diet sodas   Relatively does good with diet           Old history :    Weight is stable   Not very diet compliant   No log   Pt seems more interactive and is being honest about the TLC      Old history :     Lost 3 lbs    Kept log very well   SUGARS ARE IN GOOD RANGE  He had labs at pcp        Referred : by pcp    H/o diabetes for 10  years     Current A1C is 8.7 %   From   March 8 2017  and symptoms/problems include fluctuant sugars  and polyuria     Current diabetic medications include glipizide xr 2.5 mg a day and metformin. Current monitoring regimen: home blood tests - daily  Home blood sugar records: trend: fluctuating a lot  Any episodes of hypoglycemia? no    Weight trend: fluctuating a lot  Prior visit with dietician: no  Current diet: \"unhealthy\" diet in general  Current exercise: no regular exercise    Known diabetic complications: none  Cardiovascular risk factors: dyslipidemia, diabetes mellitus, obesity, male gender, hypertension, stress    Eye exam current (within one year): yes  AISHA: no     Past Medical History:   Diagnosis Date    DM (diabetes mellitus) ([facility] [address]      Past Surgical History:   Procedure Laterality Date    HX CYST REMOVAL  1971    HX HEMORRHOIDECTOMY      HX TONSILLECTOMY  1950     Current Outpatient Medications   Medication Sig    aspirin delayed-release 81 mg tablet Take  by mouth daily.  pravastatin (PRAVACHOL) 20 mg tablet Take 1 Tab by mouth nightly.  metFORMIN (GLUCOPHAGE) 500 mg tablet TAKE 2 TABLETS BY MOUTH 2 TIMES DAILY WITH MEALS    SITagliptin (Januvia) 100 mg tablet TAKE 1 TAB BY MOUTH DAILY.     glipiZIDE (GLUCOTROL) 5 mg tablet TAKE 1 TABLET BY MOUTH 20 MINUTES BEFORE BREAKFAST AND DINNER. STOP XR.  cetirizine (ZYRTEC) 10 mg tablet Take  by mouth.  Blood-Glucose Meter (ONETOUCH VERIO IQ METER) monitoring kit Dispense 1 meter kit. Test 2 times daily. Dx code E11.65    glucose blood VI test strips (ONETOUCH VERIO) strip Test 2 times daily. Dx code E11.65    lancets (ONE TOUCH DELICA) 33 gauge misc Test 2 times daily. Dx code E11.65    TRUEPLUS LANCETS 28 gauge misc TEST FOUR TIMES DAILY    glucose blood VI test strips (TRUE METRIX GLUCOSE TEST STRIP) strip Test 4 times daily. Dx code E11.65     No current facility-administered medications for this visit. Review of Systems   Constitutional: Negative. HENT: Negative. Eyes: Negative for pain and redness. Respiratory: Negative. Cardiovascular: Negative for chest pain, palpitations and leg swelling. Gastrointestinal: Negative. Negative for constipation. Genitourinary: Negative. Musculoskeletal: Negative for myalgias. Skin: Negative. Neurological: Negative. Endo/Heme/Allergies: Negative. Psychiatric/Behavioral: Negative for depression and memory loss. The patient does not have insomnia. Physical Exam   Constitutional: He is oriented to person, place, and time. He appears well-developed and well-nourished. HENT:   Head: Normocephalic. Eyes: Conjunctivae and EOM are normal. Pupils are equal, round, and reactive to light. Neck: Normal range of motion. Neck supple. No JVD present. No tracheal deviation present. No thyromegaly present. Cardiovascular: Normal rate, regular rhythm and normal heart sounds. Pulmonary/Chest: Effort normal and breath sounds normal.   Abdominal: Soft. Bowel sounds are normal.   Musculoskeletal: Normal range of motion. Lymphadenopathy:     He has no cervical adenopathy. Neurological: He is alert and oriented to person, place, and time. He has normal reflexes. Skin: Skin is warm.    Psychiatric: He has a normal mood and affect. 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    Lab Results   Component Value Date/Time    Hemoglobin A1c 7.5 (H) 07/30/2020 08:16 AM    Hemoglobin A1c 7.4 (H) 11/04/2019 11:02 AM    Hemoglobin A1c, External 7.9 02/13/2019    Hemoglobin A1c, External 6.9 05/15/2018    Hemoglobin A1c, External 7.2 02/08/2018    Glucose 164 (H) 07/30/2020 08:16 AM    Microalbumin/Creat ratio (mg/g creat) 33 (H) 11/04/2019 11:02 AM    Microalb/Creat ratio (ug/mg creat.) 15 07/30/2020 08:16 AM    Microalbumin,urine random 5.58 11/04/2019 11:02 AM    LDL, calculated 76 07/30/2020 08:16 AM    Creatinine 1.05 07/30/2020 08:16 AM      Lab Results   Component Value Date/Time    Cholesterol, total 139 07/30/2020 08:16 AM    HDL Cholesterol 48 07/30/2020 08:16 AM    LDL, calculated 76 07/30/2020 08:16 AM    LDL-C, External 66 02/13/2019    Triglyceride 76 07/30/2020 08:16 AM    CHOL/HDL Ratio 2.9 11/04/2019 11:02 AM       ASSESSMENT and PLAN    1.  Type 2 DM uncontrolled : a1c is  7.5 %     From     July 2020    Compared to   7.4 %     From   Nov 2019   Compared to    7.4 %   From     May 2019       Compared to  7.9 %    From   Feb 2019    Compared to    6.9 %    From   May 2018     Compared to  7.2 %    From   Nov 2017  Compared to   7 %     From    August 16th 2017     Compared to   8.7 %    From march 8 2017 August 2020    He has done same as before   Disappointing a bit to see that a1c did not change despite not drinking  soft drinks and losing weight   Stay on metformin  And , januvia and glipizide 5 mg bid   Will try increasing glipizide to 10 mg bid         Nov 2020   plataued  losing control   Improved  diet compliant   encouraging him to call office for assitance with  januvia while in donut hole   Stay on  metformin  500 mg  To two pills twice a day with meals   januvia 100 mg a day   Stay  on  Glipizide 5 mg twice  A day , twenty minutes before b-fast and dinner   Explained about the limitations on glipizide     Explained to him about the pancreatic function and how that can deteriorate with not following the diet and also especially with usage of glipizide  He definitely agrees to do better control of diet    Patient is advised to check blood sugars 1-2 times daily by rotation method. reviewed medications and side effects in detail  lab results and schedule of future lab studies reviewed with patient      2. Hypoglycemia :  Educated on treating the hypoglycemia. 3. HTN : controlled,  not on any ACEI or ARB, does not have proteinuria . Patient is educated about importance of compliance with anti-hypertensives especially ARB/ACEI    4. Dyslipidemia : lipids are good ,  on pravachol 20 mg at night   Patient is educated about benefits and adverse effects of statins and explained how benefits outweigh risk.         5. use of aspirin to prevent MI and TIA's discussed      Follow-up in 6 months        > 50 % of time is spent on counseling   Patient voiced understanding her plan of care

## 2020-08-04 NOTE — LETTER
8/4/20 Patient: Avelino Johnson YOB: 1945 Date of Visit: 8/4/2020 Gabriella Keita MD 
[address] [place] [id number] [place] [address] VIA In Basket Dear Gabriella Keita MD, Thank you for [place] [address] [address] [place]. My notes for this consultation are attached. If you have questions, please do not hesitate to call me. I look forward to following your patient along with you. Sincerely, Lupe Zuniga MD

## 2020-08-04 NOTE — PROGRESS NOTES
Wt Readings from Last 3 Encounters:   08/04/20 162 lb (73.5 kg)   11/27/19 166 lb 3.2 oz (75.4 kg)   05/30/19 163 lb 8 oz (74.2 kg)     Temp Readings from Last 3 Encounters:   08/04/20 97.2 °F (36.2 °C) (Oral)   11/27/19 96.6 °F (35.9 °C) (Oral)   05/30/19 97.5 °F (36.4 °C) (Oral)     BP Readings from Last 3 Encounters:   08/04/20 131/68   11/27/19 152/62   05/30/19 131/56     Pulse Readings from Last 3 Encounters:   08/04/20 75   11/27/19 76   05/30/19 74     Lab Results   Component Value Date/Time    Hemoglobin A1c 7.5 (H) 07/30/2020 08:16 AM    Hemoglobin A1c (POC) 7.4 05/30/2019 09:06 AM    Hemoglobin A1c, External 7.9 02/13/2019

## 2021-01-18 RX ORDER — METFORMIN HYDROCHLORIDE 500 MG/1
TABLET ORAL
Qty: 360 TAB | Refills: 1 | Status: SHIPPED | OUTPATIENT
Start: 2021-01-18 | End: 2021-02-08 | Stop reason: SDUPTHER

## 2021-01-19 VITALS
HEART RATE: 74 BPM | BODY MASS INDEX: 25.46 KG/M2 | SYSTOLIC BLOOD PRESSURE: 136 MMHG | WEIGHT: 168 LBS | TEMPERATURE: 98.5 F | OXYGEN SATURATION: 99 % | DIASTOLIC BLOOD PRESSURE: 80 MMHG | HEIGHT: 68 IN | RESPIRATION RATE: 12 BRPM

## 2021-01-19 PROBLEM — R97.20 RAISED PROSTATE SPECIFIC ANTIGEN: Status: ACTIVE | Noted: 2021-01-19

## 2021-01-19 RX ORDER — INFLUENZA VACCINE, ADJUVANTED 15; 15; 15 UG/.5ML; UG/.5ML; UG/.5ML
0.5 INJECTION, SUSPENSION INTRAMUSCULAR
COMMUNITY
End: 2021-08-09

## 2021-02-03 DIAGNOSIS — E78.2 MIXED HYPERLIPIDEMIA: ICD-10-CM

## 2021-02-03 DIAGNOSIS — I10 ESSENTIAL HYPERTENSION: ICD-10-CM

## 2021-02-03 LAB
COMMENT, HOLDF: NORMAL
CREAT UR-MCNC: 209 MG/DL
MICROALBUMIN UR-MCNC: 9.03 MG/DL
MICROALBUMIN/CREAT UR-RTO: 43 MG/G (ref 0–30)
SAMPLES BEING HELD,HOLD: NORMAL

## 2021-02-08 ENCOUNTER — OFFICE VISIT (OUTPATIENT)
Dept: ENDOCRINOLOGY | Age: 76
End: 2021-02-08
Payer: MEDICARE

## 2021-02-08 VITALS
WEIGHT: 165 LBS | HEART RATE: 81 BPM | BODY MASS INDEX: 25.9 KG/M2 | RESPIRATION RATE: 18 BRPM | SYSTOLIC BLOOD PRESSURE: 158 MMHG | TEMPERATURE: 95.9 F | OXYGEN SATURATION: 99 % | DIASTOLIC BLOOD PRESSURE: 75 MMHG | HEIGHT: 67 IN

## 2021-02-08 DIAGNOSIS — E78.2 MIXED HYPERLIPIDEMIA: ICD-10-CM

## 2021-02-08 DIAGNOSIS — I10 ESSENTIAL HYPERTENSION: ICD-10-CM

## 2021-02-08 PROCEDURE — 3017F COLORECTAL CA SCREEN DOC REV: CPT | Performed by: INTERNAL MEDICINE

## 2021-02-08 PROCEDURE — 2022F DILAT RTA XM EVC RTNOPTHY: CPT | Performed by: INTERNAL MEDICINE

## 2021-02-08 PROCEDURE — 1101F PT FALLS ASSESS-DOCD LE1/YR: CPT | Performed by: INTERNAL MEDICINE

## 2021-02-08 PROCEDURE — G8427 DOCREV CUR MEDS BY ELIG CLIN: HCPCS | Performed by: INTERNAL MEDICINE

## 2021-02-08 PROCEDURE — G8754 DIAS BP LESS 90: HCPCS | Performed by: INTERNAL MEDICINE

## 2021-02-08 PROCEDURE — G8753 SYS BP > OR = 140: HCPCS | Performed by: INTERNAL MEDICINE

## 2021-02-08 PROCEDURE — G8510 SCR DEP NEG, NO PLAN REQD: HCPCS | Performed by: INTERNAL MEDICINE

## 2021-02-08 PROCEDURE — 99214 OFFICE O/P EST MOD 30 MIN: CPT | Performed by: INTERNAL MEDICINE

## 2021-02-08 PROCEDURE — G8419 CALC BMI OUT NRM PARAM NOF/U: HCPCS | Performed by: INTERNAL MEDICINE

## 2021-02-08 PROCEDURE — 3051F HG A1C>EQUAL 7.0%<8.0%: CPT | Performed by: INTERNAL MEDICINE

## 2021-02-08 PROCEDURE — G8536 NO DOC ELDER MAL SCRN: HCPCS | Performed by: INTERNAL MEDICINE

## 2021-02-08 RX ORDER — FLASH GLUCOSE SENSOR
KIT MISCELLANEOUS
Qty: 1 KIT | Refills: 0 | Status: SHIPPED | OUTPATIENT
Start: 2021-02-08 | End: 2021-08-09

## 2021-02-08 RX ORDER — PIOGLITAZONEHYDROCHLORIDE 15 MG/1
15 TABLET ORAL DAILY
Qty: 90 TAB | Refills: 1 | Status: SHIPPED | OUTPATIENT
Start: 2021-02-08 | End: 2021-05-28

## 2021-02-08 RX ORDER — METFORMIN HYDROCHLORIDE 500 MG/1
TABLET ORAL
Qty: 360 TAB | Refills: 1 | Status: SHIPPED | OUTPATIENT
Start: 2021-02-08 | End: 2021-08-09 | Stop reason: SDUPTHER

## 2021-02-08 RX ORDER — GLIPIZIDE 5 MG/1
TABLET ORAL
Qty: 360 TAB | Refills: 3 | Status: SHIPPED | OUTPATIENT
Start: 2021-02-08 | End: 2022-01-10 | Stop reason: SDUPTHER

## 2021-02-08 RX ORDER — PRAVASTATIN SODIUM 20 MG/1
20 TABLET ORAL
Qty: 90 TAB | Refills: 4 | Status: SHIPPED | OUTPATIENT
Start: 2021-02-08 | End: 2022-01-10 | Stop reason: SDUPTHER

## 2021-02-08 NOTE — LETTER
2/8/2021    Patient: Tex Johnson Form   YOB: 1945   Date of Visit: 2/8/2021     Mj Germain MD  [address] [place] [address] Rd  [address] [address]  Via In [employer]    Dear Mj Germain MD,      Thank you for referring Mr. Betty Zambrano to [address] [place] [address] for evaluation. My notes for this consultation are attached. If you have questions, please do not hesitate to call me. I look forward to following your patient along with you.       Sincerely,    Maykel Navarrete MD

## 2021-02-08 NOTE — PROGRESS NOTES
HISTORY OF PRESENT ILLNESS  Orestes Zambrano is a 77 y.o. male. HPI  Patient here for f/u   After  visit of Type 2 diabetes mellitus from  August 2020     Pt has been doing well   He does not like checking sugars-   Compliant with meds         August 2020     A gap of   Almost 10 months   Lost 4 lbs   Started on ASA  In feb 2020   In MArch 2020, he stopped  Soft drinks         Referred : by pcp    H/o diabetes for 10  years   Current A1C is 8.7 %   From   March 8 2017  and symptoms/problems include fluctuant sugars  and polyuria   Current diabetic medications include glipizide xr 2.5 mg a day and metformin. Cardiovascular risk factors: dyslipidemia, diabetes mellitus, obesity, male gender, hypertension, stress      Review of Systems   Constitutional: Negative. Neurological: Negative. Physical Exam   Constitutional: He is oriented to person, place, and time. He appears well-developed and well-nourished. Neck: Normal range of motion. Neurological: He is alert and oriented to person, place, and time. He has normal reflexes. Skin: Skin is warm. Psychiatric: He has a normal mood and affect.    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    Lab Results   Component Value Date/Time    Hemoglobin A1c 7.6 (H) 02/01/2021 08:29 AM    Hemoglobin A1c 7.5 (H) 07/30/2020 08:16 AM    Hemoglobin A1c 7.4 (H) 11/04/2019 11:02 AM    Hemoglobin A1c, External 8.9 02/25/2020    Hemoglobin A1c, External 7.9 02/13/2019    Hemoglobin A1c, External 6.9 05/15/2018    Glucose 222 (H) 02/01/2021 08:29 AM    Microalbumin/Creat ratio (mg/g creat) 43 (H) 02/03/2021 10:02 AM    Microalbumin,urine random 9.03 02/03/2021 10:02 AM    LDL, calculated 80.8 02/01/2021 08:29 AM    Creatinine 1.10 02/01/2021 08:29 AM      Lab Results   Component Value Date/Time    Cholesterol, total 155 02/01/2021 08:29 AM    HDL Cholesterol 53 02/01/2021 08:29 AM    LDL, calculated 80.8 02/01/2021 08:29 AM    LDL-C, External 86 02/25/2020 Triglyceride 106 02/01/2021 08:29 AM    CHOL/HDL Ratio 2.9 02/01/2021 08:29 AM       ASSESSMENT and PLAN    1. Type 2 DM uncontrolled : a1c is   7.6 %     From     Feb 2021   compared to    7.5 %     From     July 2020    Compared to   7.4 %     From   Nov 2019   Compared to    7.4 %   From     May 2019       Compared to  7.9 %    From   Feb 2019    Compared to    6.9 %    From   May 2018     Compared to  7.2 %    From   Nov 2017  Compared to   7 %     From    August 16th 2017     Compared to   8.7 %    From march 8 2017 Feb 2021   Stay on metformin  And , januvia and glipizide 10 mg bid   Explained to him about the pancreatic function and how that can deteriorate with not following the diet and also especially with usage of glipizide  He definitely agrees to do better control of diet  Recommending use of josé antonio sensor - he has to buy it as it is not covered   Recommending actos 15 mg a day     August 2020    He has done same as before Disappointing a bit to see that a1c did not change despite not drinking  soft drinks and losing weight   Stay on metformin  And , januvia and glipizide 5 mg bid   Will try increasing glipizide to 10 mg bid     Nov 2020   plataued  losing control   Improved  diet compliant   encouraging him to call office for assitance with  januvia while in donut hole   Stay on  metformin  500 mg  To two pills twice a day with meals   januvia 100 mg a day   Stay  on  Glipizide 5 mg twice  A day , twenty minutes before b-fast and dinner   Explained about the limitations on glipizide         2. Hypoglycemia :  Educated on treating the hypoglycemia. 3. HTN : controlled,  not on any ACEI or ARB, does not have proteinuria . Patient is educated about importance of compliance with anti-hypertensives especially ARB/ACEI    4.   Dyslipidemia : lipids are good ,  on pravachol 20 mg at night   Patient is educated about benefits and adverse effects of statins and explained how benefits outweigh risk.        5. use of aspirin to prevent MI and TIA's discussed      Follow-up in 6 months        > 50 % of time is spent on counseling   Patient voiced understanding her plan of care

## 2021-02-08 NOTE — PATIENT INSTRUCTIONS
SPECIFIC INSTRUCTIONS BELOW   Check blood sugars only twice a day by rotation as follows    First day - before b-fast and - before lunch  Second day- before dinner and  before bed time    After 2 days go back to same rotation      -------------------------------------------------------------------------------------------------  Stay  on pravachol 20 mg at night       Stay  on  metformin  500 mg to 2 pills  twice a day with meals   continue  On januvia 100 mg a day   Glipizide 5 mg to 2 pills   twice  A day , twenty minutes before b-fast and dinner   Start on pioglitazone  15 mg a day         SENSOR  - watch [employer]         ---------------------------------------------------------------------------------------------------------    DRINK water   Do not skip meals  Do not eat in between meals    Reduce carbs- pasta, rice, potatoes, bread   Try to avoid processed bread products like BISCUITS, CROISSANTS, MUFFINS  Do not drink juices or sodas, even if they are calorie zero or diet drinks and especially avoid using powders like crystalloids   Do not eat peanut butter     Do not eat sugar free cookies and cakes   Do not eat peaches, oranges, pineapples, raisins, grapes , canteloupe , honey dew, mangoes , watermelon  and fruit medleys          PAY ATTENTION TO THESE GENERAL INSTRUCTIONS     - HEALTH MAINTENANCE IS NOT GOING TO BE UP TO DATE ON YOUR AVS- PLEASE IGNORE   - YOUR MED LIST IS NOT UP TO DATE AS SOME CHANGES ARE BEING MADE AFTER THE VISIT - FOLLOW SPECIFIC INSTRUCTIONS ABOVE     Results     *Normal results will not be notified by a phone call starting January 1 2021   *If you have an upcoming visit, the results will be discussed at the visit   *Please sign up for MY CHART if you want access to your lab and test results  *Abnormal results which require immediate attention will be notified by phone call   *Abnormal results which do not require immediate assistance will be notified in 1-2 weeks       Refills -    have your pharmacy send us a refill request  Phone calls  -  Allow  24 hrs. for non-urgent calls to be returned  Prior authorization - It may take 2-4 weeks to process  Forms  -  FMLA, DMV etc., will take up to 2 weeks to process  Cancellations - please notify the office 2 days in advance   Samples  - will only be dispensed at visits     --------------------------------------------------------------------------------------------    - download the freestyle [employer] eleuterio now    To establish the remote access to your glucose log - follow these steps     - FIRST , download freestyle Attala eleuterio onto your smart phone to use it as a reader to scan the sensor for blood sugars     -establish \" [employer] \" account by visiting  [employer] website  [email or web address]    -Accept our invite waiting for you in the email which you have used to set up the account  Or   Enter  \"carediabetes\"  ( our practice unique id )   By doing so, you link up to our office account       - if you are using phone as your scanner , we can access your glucose data. - If you are using the ,  Upload the data on to the account via a computer/laptop     How to do that can be accessed again by going onto the website  [email or web address]

## 2021-02-08 NOTE — PROGRESS NOTES
1. Have you been to the ER, urgent care clinic since your last visit?no  Hospitalized since your last visit? No    2. Have you seen or consulted any other health care providers outside of the [address] [place] [address] since your last visit? Include any pap smears or colon screening.  No    Wt Readings from Last 3 Encounters:   02/08/21 165 lb (74.8 kg)   02/25/20 168 lb (76.2 kg)   08/04/20 162 lb (73.5 kg)     Temp Readings from Last 3 Encounters:   02/08/21 (!) 95.9 °F (35.5 °C) (Oral)   02/25/20 98.5 °F (36.9 °C) (Oral)   08/04/20 97.2 °F (36.2 °C) (Oral)     BP Readings from Last 3 Encounters:   02/08/21 (!) 158/75   02/25/20 136/80   08/04/20 131/68     Pulse Readings from Last 3 Encounters:   02/08/21 81   02/25/20 74   08/04/20 75     Lab Results   Component Value Date/Time    Hemoglobin A1c 7.6 (H) 02/01/2021 08:29 AM    Hemoglobin A1c (POC) 7.4 05/30/2019 09:06 AM    Hemoglobin A1c, External 8.9 02/25/2020

## 2021-02-09 RX ORDER — CALCIUM CITRATE/VITAMIN D3 200MG-6.25
TABLET ORAL
Qty: 100 STRIP | Refills: 6 | Status: SHIPPED | OUTPATIENT
Start: 2021-02-09 | End: 2021-08-09

## 2021-05-28 RX ORDER — PIOGLITAZONEHYDROCHLORIDE 15 MG/1
TABLET ORAL
Qty: 90 TABLET | Refills: 1 | Status: SHIPPED | OUTPATIENT
Start: 2021-05-28 | End: 2021-08-09 | Stop reason: SDUPTHER

## 2021-07-20 NOTE — PROGRESS NOTES
If provider orders tests or biopsy at today's visit, patient would like to be contacted with results at [phone] and it is OK to leave a detailed message on voice mail or with family member.  If medications are ordered at today's visit, the pharmacy name/location patient would like them to be sent to is   [facility] #[id number] [address] AT [address] [address]  Phone: [phone] Fax: [phone]  .      Wt Readings from Last 3 Encounters:   05/16/17 170 lb (77.1 kg)     Temp Readings from Last 3 Encounters:   05/16/17 97.7 °F (36.5 °C) (Oral)     BP Readings from Last 3 Encounters:   05/16/17 144/75     Pulse Readings from Last 3 Encounters:   05/16/17 72     No Podiatry  Last Eye exam April 2017

## 2021-08-09 ENCOUNTER — OFFICE VISIT (OUTPATIENT)
Dept: ENDOCRINOLOGY | Age: 76
End: 2021-08-09
Payer: MEDICARE

## 2021-08-09 VITALS
HEIGHT: 67 IN | OXYGEN SATURATION: 98 % | RESPIRATION RATE: 18 BRPM | HEART RATE: 87 BPM | TEMPERATURE: 96.9 F | SYSTOLIC BLOOD PRESSURE: 140 MMHG | BODY MASS INDEX: 26.21 KG/M2 | WEIGHT: 167 LBS | DIASTOLIC BLOOD PRESSURE: 69 MMHG

## 2021-08-09 DIAGNOSIS — I10 ESSENTIAL HYPERTENSION: ICD-10-CM

## 2021-08-09 DIAGNOSIS — E78.2 MIXED HYPERLIPIDEMIA: ICD-10-CM

## 2021-08-09 PROCEDURE — G8536 NO DOC ELDER MAL SCRN: HCPCS | Performed by: INTERNAL MEDICINE

## 2021-08-09 PROCEDURE — G8432 DEP SCR NOT DOC, RNG: HCPCS | Performed by: INTERNAL MEDICINE

## 2021-08-09 PROCEDURE — 99214 OFFICE O/P EST MOD 30 MIN: CPT | Performed by: INTERNAL MEDICINE

## 2021-08-09 PROCEDURE — 3017F COLORECTAL CA SCREEN DOC REV: CPT | Performed by: INTERNAL MEDICINE

## 2021-08-09 PROCEDURE — 1101F PT FALLS ASSESS-DOCD LE1/YR: CPT | Performed by: INTERNAL MEDICINE

## 2021-08-09 PROCEDURE — G8754 DIAS BP LESS 90: HCPCS | Performed by: INTERNAL MEDICINE

## 2021-08-09 PROCEDURE — 2022F DILAT RTA XM EVC RTNOPTHY: CPT | Performed by: INTERNAL MEDICINE

## 2021-08-09 PROCEDURE — G8419 CALC BMI OUT NRM PARAM NOF/U: HCPCS | Performed by: INTERNAL MEDICINE

## 2021-08-09 PROCEDURE — G8753 SYS BP > OR = 140: HCPCS | Performed by: INTERNAL MEDICINE

## 2021-08-09 PROCEDURE — G8427 DOCREV CUR MEDS BY ELIG CLIN: HCPCS | Performed by: INTERNAL MEDICINE

## 2021-08-09 PROCEDURE — 3051F HG A1C>EQUAL 7.0%<8.0%: CPT | Performed by: INTERNAL MEDICINE

## 2021-08-09 RX ORDER — METFORMIN HYDROCHLORIDE 500 MG/1
TABLET ORAL
Qty: 360 TABLET | Refills: 3 | Status: SHIPPED | OUTPATIENT
Start: 2021-08-09 | End: 2022-01-10 | Stop reason: SDUPTHER

## 2021-08-09 RX ORDER — PIOGLITAZONEHYDROCHLORIDE 15 MG/1
TABLET ORAL
Qty: 90 TABLET | Refills: 3 | Status: SHIPPED | OUTPATIENT
Start: 2021-08-09 | End: 2022-01-10 | Stop reason: SDUPTHER

## 2021-08-09 NOTE — PROGRESS NOTES
Sherice Zambrano is a 77 y.o. male here for   Chief Complaint   Patient presents with    Diabetes       1. Have you been to the ER, urgent care clinic since your last visit? Hospitalized since your last visit? -no    2. Have you seen or consulted any other health care providers outside of the [address] [place] [address] since your last visit?   Include any pap smears or colon screening.-Eye doc and Urology Dr. Doreen Justin

## 2021-08-09 NOTE — PROGRESS NOTES
HISTORY OF PRESENT ILLNESS  Carlyon Dakins Form is a 77 y.o. male. HPI  Patient here for f/u   After  visit of Type 2 diabetes mellitus from  Feb 2021     Pt has been doing well   He does not like checking sugars-   Compliant with meds   Requesting form fill up for [employer] 2 lbs         August 2020     A gap of   Almost 10 months   Lost 4 lbs   Started on ASA  In feb 2020   In MArch 2020, he stopped  Soft drinks         Referred : by pcp    H/o diabetes for 10  years   Current A1C is 8.7 %   From   March 8 2017  and symptoms/problems include fluctuant sugars  and polyuria   Current diabetic medications include glipizide xr 2.5 mg a day and metformin. Cardiovascular risk factors: dyslipidemia, diabetes mellitus, obesity, male gender, hypertension, stress      Review of Systems   Constitutional: Negative. Neurological: Negative. Physical Exam   Constitutional: He is oriented to person, place, and time. He appears well-developed and well-nourished.    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    Lab Results   Component Value Date/Time    Hemoglobin A1c 7.4 (H) 08/02/2021 12:00 AM    Hemoglobin A1c 7.6 (H) 02/01/2021 08:29 AM    Hemoglobin A1c 7.5 (H) 07/30/2020 08:16 AM    Hemoglobin A1c, External 8.9 02/25/2020 12:00 AM    Hemoglobin A1c, External 7.9 02/13/2019 12:00 AM    Hemoglobin A1c, External 6.9 05/15/2018 12:00 AM    Glucose 161 (H) 08/02/2021 12:00 AM    Microalbumin/Creat ratio (mg/g creat) 43 (H) 02/03/2021 10:02 AM    Microalb/Creat ratio (ug/mg creat.) 18 08/02/2021 12:00 AM    Microalbumin,urine random 9.03 02/03/2021 10:02 AM    LDL, calculated 85 08/02/2021 12:00 AM    LDL, calculated 80.8 02/01/2021 08:29 AM    Creatinine 1.06 08/02/2021 12:00 AM      Lab Results   Component Value Date/Time    Cholesterol, total 154 08/02/2021 12:00 AM    HDL Cholesterol 49 08/02/2021 12:00 AM    LDL, calculated 85 08/02/2021 12:00 AM    LDL, calculated 80.8 02/01/2021 08:29 AM LDL-C, External 86 02/25/2020 12:00 AM    Triglyceride 109 08/02/2021 12:00 AM    CHOL/HDL Ratio 2.9 02/01/2021 08:29 AM       ASSESSMENT and PLAN    1. Type 2 DM uncontrolled : a1c is  7.4%       From august 2021   Compared to     7.6 %     From     Feb 2021   compared to    7.5 %     From     July 2020    Compared to   7.4 %     From   Nov 2019   Compared to    7.4 %   From     May 2019       Compared to  7.9 %    From   Feb 2019    Compared to    6.9 %    From   May 2018     Compared to  7.2 %    From   Nov 2017  Compared to   7 %     From    August 16th 2017     Compared to   8.7 %    From march 8 2017 August 2021   [id number] [address] to not check sugars as his control is stable   Stay on metformin  And , januvia and glipizide 10 mg bid , added actos 6 months ago 15 mg a day   Applying for patient assistance for januvia       Feb 2021   Stay on metformin  And , januvia and glipizide 10 mg bid   Explained to him about the pancreatic function and how that can deteriorate with not following the diet and also especially with usage of glipizide  He definitely agrees to do better control of diet  Recommending use of josé antonio sensor - he has to buy it as it is not covered   Recommending actos 15 mg a day     August 2020    He has done same as before Disappointing a bit to see that a1c did not change despite not drinking  soft drinks and losing weight   Stay on metformin  And , januvia and glipizide 5 mg bid   Will try increasing glipizide to 10 mg bid     Nov 2020   plataued  losing control   Improved  diet compliant   encouraging him to call office for assitance with  januvia while in donut hole   Stay on  metformin  500 mg  To two pills twice a day with meals   januvia 100 mg a day   Stay  on  Glipizide 5 mg twice  A day , twenty minutes before b-fast and dinner   Explained about the limitations on glipizide         2. Hypoglycemia :  Educated on treating the hypoglycemia. 3.  HTN : controlled,  not on any ACEI or ARB, does not have proteinuria . 4. Dyslipidemia : lipids are good ,  on pravachol 20 mg at night   Patient is educated about benefits and adverse effects of statins and explained how benefits outweigh risk. 5. 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 Richard Leblanc      6.  Elevated PSA - f/u regularly       Follow-up in 6 months          Reviewed results with patient and discussed the labs being ordered today/bnv  Patient voiced understanding of plan of care

## 2021-08-09 NOTE — LETTER
8/9/2021    Patient: Lyndon Zambrano   YOB: 1945   Date of Visit: 8/9/2021     Sergio Bundy MD  [address]  [address] [address]  [address] [facility] [address]    Dear Sergio Bundy MD,      Thank you for referring Mr. Delphine Zambrano to [address] [place] [address] for evaluation. My notes for this consultation are attached. If you have questions, please do not hesitate to call me. I look forward to following your patient along with you.       Sincerely,    Nidia Muhammad MD

## 2021-08-09 NOTE — PATIENT INSTRUCTIONS
SPECIFIC INSTRUCTIONS BELOW       Stay  on pravachol 20 mg at night       Stay  on  metformin  500 mg to 2 pills  twice a day with meals     continue  On januvia 100 mg a day     Glipizide 5 mg to 2 pills   twice  A day , twenty minutes before b-fast and dinner     Stay on pioglitazone  15 mg a day                   -------------PAY ATTENTION TO THESE GENERAL INSTRUCTIONS -----------------    -ANY tests other than blood work, which you opt to do  outside the  [facility] imaging facilities, you are responsible for prior authorizations if  required    - HEALTH MAINTENANCE IS NOT GOING TO BE UP TO DATE ON YOUR AVS- PLEASE IGNORE   - YOUR MED LIST IS NOT UP TO DATE AS SOME CHANGES ARE BEING MADE AFTER THE VISIT - FOLLOW SPECIFIC INSTRUCTIONS  ABOVE     Results     *Normal results will not be notified by a phone call starting January 1 2021   *If you have an upcoming visit, the results will be discussed at the visit   *Please sign up for MY CHART if you want access to your lab and test results  *Abnormal results which require immediate attention will be notified by phone call   *Abnormal results which do not require immediate assistance will be notified in 1-2 weeks       Refills    -    have your pharmacy send us a refill request . Refills are done max for one year and a visit is a must before refills are extended    Follow up appointments -  highly encourage you to make it when you are checking out. We can accommodate you into the schedule based on your clinical situation, but not for extending refills beyond a year. Labs are important to give refills and is important to get labs before the visit     Phone calls  -  Allow  24 hrs.  for non-urgent calls to be returned  Prior authorization - It may take 2-4 weeks to process  Forms  -  FMLA, DMV etc., will take up to 2 weeks to process  Cancellations - please notify the office 2 days in advance   Samples  - will only be dispensed at visits       If not showing for the appointments and cancelling appointments within 24 hours are kept track of and three  of such situations in  two consecutive years will likely be considered for termination from the practice    -------------------------------------------------------------------------------------------------------------------

## 2022-01-10 RX ORDER — PIOGLITAZONEHYDROCHLORIDE 15 MG/1
TABLET ORAL
Qty: 90 TABLET | Refills: 3 | Status: SHIPPED | OUTPATIENT
Start: 2022-01-10 | End: 2022-02-08 | Stop reason: DRUGHIGH

## 2022-01-10 RX ORDER — GLIPIZIDE 5 MG/1
TABLET ORAL
Qty: 360 TABLET | Refills: 3 | Status: SHIPPED | OUTPATIENT
Start: 2022-01-10 | End: 2022-03-24

## 2022-01-10 RX ORDER — METFORMIN HYDROCHLORIDE 500 MG/1
TABLET ORAL
Qty: 360 TABLET | Refills: 3 | Status: SHIPPED | OUTPATIENT
Start: 2022-01-10 | End: 2022-09-20

## 2022-01-10 RX ORDER — PRAVASTATIN SODIUM 20 MG/1
20 TABLET ORAL
Qty: 90 TABLET | Refills: 4 | Status: SHIPPED | OUTPATIENT
Start: 2022-01-10 | End: 2022-03-09

## 2022-01-10 NOTE — TELEPHONE ENCOUNTER
Patient would like all meds to go to [facility] in [facility].  Thank you

## 2022-02-08 ENCOUNTER — OFFICE VISIT (OUTPATIENT)
Dept: ENDOCRINOLOGY | Age: 77
End: 2022-02-08
Payer: MEDICARE

## 2022-02-08 VITALS
HEIGHT: 67 IN | OXYGEN SATURATION: 100 % | WEIGHT: 171.6 LBS | BODY MASS INDEX: 26.93 KG/M2 | SYSTOLIC BLOOD PRESSURE: 155 MMHG | HEART RATE: 75 BPM | DIASTOLIC BLOOD PRESSURE: 64 MMHG | TEMPERATURE: 97.4 F

## 2022-02-08 DIAGNOSIS — I10 ESSENTIAL HYPERTENSION: ICD-10-CM

## 2022-02-08 DIAGNOSIS — E78.2 MIXED HYPERLIPIDEMIA: ICD-10-CM

## 2022-02-08 PROCEDURE — G8754 DIAS BP LESS 90: HCPCS | Performed by: INTERNAL MEDICINE

## 2022-02-08 PROCEDURE — G8432 DEP SCR NOT DOC, RNG: HCPCS | Performed by: INTERNAL MEDICINE

## 2022-02-08 PROCEDURE — 3017F COLORECTAL CA SCREEN DOC REV: CPT | Performed by: INTERNAL MEDICINE

## 2022-02-08 PROCEDURE — 99214 OFFICE O/P EST MOD 30 MIN: CPT | Performed by: INTERNAL MEDICINE

## 2022-02-08 PROCEDURE — 2022F DILAT RTA XM EVC RTNOPTHY: CPT | Performed by: INTERNAL MEDICINE

## 2022-02-08 PROCEDURE — G8753 SYS BP > OR = 140: HCPCS | Performed by: INTERNAL MEDICINE

## 2022-02-08 PROCEDURE — G8427 DOCREV CUR MEDS BY ELIG CLIN: HCPCS | Performed by: INTERNAL MEDICINE

## 2022-02-08 PROCEDURE — G8536 NO DOC ELDER MAL SCRN: HCPCS | Performed by: INTERNAL MEDICINE

## 2022-02-08 PROCEDURE — 1101F PT FALLS ASSESS-DOCD LE1/YR: CPT | Performed by: INTERNAL MEDICINE

## 2022-02-08 PROCEDURE — 3051F HG A1C>EQUAL 7.0%<8.0%: CPT | Performed by: INTERNAL MEDICINE

## 2022-02-08 PROCEDURE — G8419 CALC BMI OUT NRM PARAM NOF/U: HCPCS | Performed by: INTERNAL MEDICINE

## 2022-02-08 RX ORDER — PIOGLITAZONEHYDROCHLORIDE 30 MG/1
TABLET ORAL
Qty: 90 TABLET | Refills: 3 | Status: SHIPPED | OUTPATIENT
Start: 2022-02-08

## 2022-02-08 NOTE — PROGRESS NOTES
HISTORY OF PRESENT ILLNESS  Pratima Zambrano is a 77 y.o. male. HPI  Patient here for f/u   After  visit of Type 2 diabetes mellitus from  Feb 2021     Gained  4 lbs   No meter, dislikes checks         August 2021     Pt has been doing well   He does not like checking sugars-   Compliant with meds   Requesting form fill up for [employer] 2 lbs         August 2020     A gap of   Almost 10 months   Lost 4 lbs   Started on ASA  In feb 2020   In MArch 2020, he stopped  Soft drinks         Referred : by pcp    H/o diabetes for 10  years   Current A1C is 8.7 %   From   March 8 2017  and symptoms/problems include fluctuant sugars  and polyuria   Current diabetic medications include glipizide xr 2.5 mg a day and metformin. Cardiovascular risk factors: dyslipidemia, diabetes mellitus, obesity, male gender, hypertension, stress      Review of Systems   Constitutional: Negative. Neurological: Negative. Physical Exam   Constitutional: He is oriented to person, place, and time. He appears well-developed and well-nourished.    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      Diabetic feet exam :    H/o partial or complete amputation of foot : N  H/o previous foot ulceration ; N  H/o pre - ulcerative callus : no  H/o peripheral neuropathy and callus : no  H/o poor circulation     AISHA   : No  Foot deformity : Y      Lab Results   Component Value Date/Time    Hemoglobin A1c 7.5 (H) 02/01/2022 09:22 AM    Hemoglobin A1c 7.4 (H) 08/02/2021 12:00 AM    Hemoglobin A1c 7.6 (H) 02/01/2021 08:29 AM    Hemoglobin A1c, External 8.9 02/25/2020 12:00 AM    Hemoglobin A1c, External 7.9 02/13/2019 12:00 AM    Hemoglobin A1c, External 6.9 05/15/2018 12:00 AM    Glucose 257 (H) 02/01/2022 09:22 AM    Microalbumin/Creat ratio (mg/g creat) 32 (H) 02/01/2022 09:22 AM    Microalbumin,urine random 4.36 02/01/2022 09:22 AM    LDL, calculated 72.6 02/01/2022 09:22 AM    Creatinine 1.12 02/01/2022 09:22 AM      Lab Results Component Value Date/Time    Cholesterol, total 148 02/01/2022 09:22 AM    HDL Cholesterol 53 02/01/2022 09:22 AM    LDL, calculated 72.6 02/01/2022 09:22 AM    LDL-C, External 86 02/25/2020 12:00 AM    Triglyceride 112 02/01/2022 09:22 AM    CHOL/HDL Ratio 2.8 02/01/2022 09:22 AM       ASSESSMENT and PLAN    1.  Type 2 DM uncontrolled : a1c is   7.5%    From    Feb 2022   Compared to   7.4%       From august 2021   Compared to     7.6 %     From     Feb 2021   compared to    7.5 %     From     July 2020    Compared to   7.4 %     From   Nov 2019   Compared to    7.4 %   From     May 2019       Compared to  7.9 %    From   Feb 2019    Compared to    6.9 %    From   May 2018     Compared to  7.2 %    From   Nov 2017  Compared to   7 %     From    August 16th 2017     Compared to   8.7 %    From march 8 2017 Feb 2022     Ok to not check sugars as his control is stable   Stay on metformin  And , januvia and glipizide 10 mg bid , increase  actos to 30  mg a day         August 2021   Anais Zhao to not check sugars as his control is stable   Stay on metformin  And , januvia and glipizide 10 mg bid , added actos 6 months ago 15 mg a day   Applying for patient assistance for januvia       Feb 2021   Stay on metformin  And , januvia and glipizide 10 mg bid   Explained to him about the pancreatic function and how that can deteriorate with not following the diet and also especially with usage of glipizide  He definitely agrees to do better control of diet  Recommending use of josé antonio sensor - he has to buy it as it is not covered   Recommending actos 15 mg a day     August 2020    He has done same as before Disappointing a bit to see that a1c did not change despite not drinking  soft drinks and losing weight   Stay on metformin  And , januvia and glipizide 5 mg bid   Will try increasing glipizide to 10 mg bid     Nov 2020   plataued  losing control   Improved  diet compliant   encouraging him to call office for assitance with fazaluvia while in donut hole   Stay on  metformin  500 mg  To two pills twice a day with meals   januvia 100 mg a day   Stay  on  Glipizide 5 mg twice  A day , twenty minutes before b-fast and dinner   Explained about the limitations on glipizide         2. Hypoglycemia :  Educated on treating the hypoglycemia. 3. HTN : controlled,  not on any ACEI or ARB, does not have proteinuria . 4. Dyslipidemia : lipids are good ,  on pravachol 20 mg at night   Patient is educated about benefits and adverse effects of statins and explained how benefits outweigh risk. 5. 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 Emma Padilla      6.  Elevated PSA - f/u regularly       Follow-up in 6 months          Reviewed results with patient and discussed the labs being ordered today/bnv  Patient voiced understanding of plan of care

## 2022-02-08 NOTE — LETTER
2/8/2022    Patient: Saurabh Elizalde Form   YOB: 1945   Date of Visit: 2/8/2022     Brook Schaffer MD  [address] [place] [address] Rd  [address] [address]  Via [facility] [address]    Dear Brook Schaffer MD,      Thank you for referring  [facility] Form to [address] [address] for evaluation. My notes for this consultation are attached. If you have questions, please do not hesitate to call me. I look forward to following your patient along with you.       Sincerely,    Kenrick Woo MD

## 2022-02-08 NOTE — PROGRESS NOTES
Robina Zambrano is a 77 y.o. male here for   Chief Complaint   Patient presents with    Diabetes       1. Have you been to the ER, urgent care clinic since your last visit? Hospitalized since your last visit? - no    2. Have you seen or consulted any other health care providers outside of the [address] [place] [address] since your last visit?   Include any pap smears or colon screening.- no

## 2022-02-08 NOTE — PATIENT INSTRUCTIONS
SPECIFIC INSTRUCTIONS BELOW       Stay  on pravachol 20 mg at night       Stay  on  metformin  500 mg to 2 pills  twice a day with meals     continue  On januvia 100 mg a day  ( pt assistance )      Glipizide 5 mg to 2 pills   twice  A day , twenty minutes before b-fast and dinner     Increase  pioglitazone  30  mg a day             -------------PAY ATTENTION TO THESE GENERAL INSTRUCTIONS -----------------      - The medications prescribed at this visit will not be available at pharmacy until 6 pm       - YOUR MED LIST IS NOT UP TO DATE AS SOME CHANGES ARE BEING MADE AFTER THE VISIT - FOLLOW SPECIFIC INSTRUCTIONS  ABOVE     -ANY tests other than blood work, which you opt to do  outside the  [facility] imaging facilities, you are responsible for prior authorizations if  required    - [address] UP TO DATE ON YOUR AVS- PLEASE IGNORE     Results     *Normal results will not be notified by a phone call starting January 1 2021   *If you have an upcoming visit, the results will be discussed at the visit   *Please sign up for MY CHART if you want access to your lab and test results  *Abnormal results which require immediate attention will be notified by phone call   *Abnormal results which do not require immediate assistance will be notified in 1-2 weeks       Refills    -    have your pharmacy send us a refill request . Refills are done max for one year and a visit is a must before refills are extended    Follow up appointments -  highly encourage you to make it when you are checking out. We can accommodate you into the schedule based on your clinical situation, but not for extending refills beyond a year. Labs are important to give refills and is important to get labs before the visit     Phone calls  -  Allow  24 hrs.  for non-urgent calls to be returned  Prior authorization - It may take 2-4 weeks to process  Forms  -  FMLA, DMV etc., will take up to 2 weeks to process  Cancellations - please notify the office 2 days in advance   Samples  - will only be dispensed at visits       If not showing for the appointments and cancelling appointments within 24 hours are kept track of and three  of such situations in  two consecutive years will likely be considered for termination from the practice    -------------------------------------------------------------------------------------------------------------------

## 2022-03-07 ENCOUNTER — OFFICE VISIT (OUTPATIENT)
Dept: FAMILY MEDICINE CLINIC | Age: 77
End: 2022-03-07
Payer: MEDICARE

## 2022-03-07 VITALS
OXYGEN SATURATION: 99 % | DIASTOLIC BLOOD PRESSURE: 73 MMHG | BODY MASS INDEX: 26.68 KG/M2 | SYSTOLIC BLOOD PRESSURE: 133 MMHG | HEART RATE: 74 BPM | WEIGHT: 170 LBS | TEMPERATURE: 97.3 F | RESPIRATION RATE: 16 BRPM | HEIGHT: 67 IN

## 2022-03-07 DIAGNOSIS — R03.0 ELEVATED BLOOD PRESSURE READING WITHOUT DIAGNOSIS OF HYPERTENSION: Primary | ICD-10-CM

## 2022-03-07 DIAGNOSIS — C61 PROSTATE CANCER (HCC): ICD-10-CM

## 2022-03-07 DIAGNOSIS — G47.33 OSA (OBSTRUCTIVE SLEEP APNEA): ICD-10-CM

## 2022-03-07 PROCEDURE — G8536 NO DOC ELDER MAL SCRN: HCPCS | Performed by: STUDENT IN AN ORGANIZED HEALTH CARE EDUCATION/TRAINING PROGRAM

## 2022-03-07 PROCEDURE — G8427 DOCREV CUR MEDS BY ELIG CLIN: HCPCS | Performed by: STUDENT IN AN ORGANIZED HEALTH CARE EDUCATION/TRAINING PROGRAM

## 2022-03-07 PROCEDURE — 1101F PT FALLS ASSESS-DOCD LE1/YR: CPT | Performed by: STUDENT IN AN ORGANIZED HEALTH CARE EDUCATION/TRAINING PROGRAM

## 2022-03-07 PROCEDURE — 99214 OFFICE O/P EST MOD 30 MIN: CPT | Performed by: STUDENT IN AN ORGANIZED HEALTH CARE EDUCATION/TRAINING PROGRAM

## 2022-03-07 PROCEDURE — G8754 DIAS BP LESS 90: HCPCS | Performed by: STUDENT IN AN ORGANIZED HEALTH CARE EDUCATION/TRAINING PROGRAM

## 2022-03-07 PROCEDURE — G8419 CALC BMI OUT NRM PARAM NOF/U: HCPCS | Performed by: STUDENT IN AN ORGANIZED HEALTH CARE EDUCATION/TRAINING PROGRAM

## 2022-03-07 PROCEDURE — G8510 SCR DEP NEG, NO PLAN REQD: HCPCS | Performed by: STUDENT IN AN ORGANIZED HEALTH CARE EDUCATION/TRAINING PROGRAM

## 2022-03-07 PROCEDURE — 3017F COLORECTAL CA SCREEN DOC REV: CPT | Performed by: STUDENT IN AN ORGANIZED HEALTH CARE EDUCATION/TRAINING PROGRAM

## 2022-03-07 PROCEDURE — 2022F DILAT RTA XM EVC RTNOPTHY: CPT | Performed by: STUDENT IN AN ORGANIZED HEALTH CARE EDUCATION/TRAINING PROGRAM

## 2022-03-07 PROCEDURE — 3051F HG A1C>EQUAL 7.0%<8.0%: CPT | Performed by: STUDENT IN AN ORGANIZED HEALTH CARE EDUCATION/TRAINING PROGRAM

## 2022-03-07 PROCEDURE — G8752 SYS BP LESS 140: HCPCS | Performed by: STUDENT IN AN ORGANIZED HEALTH CARE EDUCATION/TRAINING PROGRAM

## 2022-03-07 NOTE — PROGRESS NOTES
Subjective:     Chief Complaint   Patient presents with    Follow-up     HBP     HPI:  Osman Zambrano is a 77 y.o. male who presents for elevated blood pressure. Patient does have history of HTN. History of diabetes, HLD both well controlled. Follows with endocrinology. Blood pressure has been elevated at home occasionally in the systolic blood pressure 294E and lower 150s. Does not check blood pressure every day. Blood pressure today is normal.  Denies CP, or SOB. No other complaints today. Follows with urology due to low-grade prostate cancer. Rowley decision making was made and no treatment so far as it is only 2% mortality with his low-grade cancer. Was positive for SIOBHAN. Scheduled to see dentist discuss trying a mouthguard. Does not want CPAP. Patient Active Problem List    Diagnosis    Raised prostate specific antigen    Uncontrolled type 2 diabetes mellitus with complication, without long-term current use of insulin ([facility] [address] [facility]    Essential hypertension    Mixed hyperlipidemia     Past Medical History:   Diagnosis Date    Allergies     Arthritis     DM (diabetes mellitus) ([facility]      Family History   Problem Relation Age of Onset    Cancer Father     Alzheimer's Disease Father     Diabetes Mother     Diabetes Maternal Grandmother     Diabetes Maternal Grandfather       reports that he has never smoked. He has never used smokeless tobacco.  Current Outpatient Medications on File Prior to Visit   Medication Sig Dispense Refill    pioglitazone (ACTOS) 30 mg tablet One pill a  Day   Stop  15 mg dose 90 Tablet 3    glipiZIDE (GLUCOTROL) 5 mg tablet Take two pills twice daily, twenty minutes before breakfast and dinner STOP GLIPIZIDE  Tablet 3    metFORMIN (GLUCOPHAGE) 500 mg tablet TAKE 2 TABLETS BY MOUTH TWICE A DAY WITH MEALS 360 Tablet 3    pravastatin (PRAVACHOL) 20 mg tablet Take 1 Tablet by mouth nightly.  90 Tablet 4    SITagliptin (Januvia) 100 mg tablet TAKE 1 TAB BY MOUTH DAILY. E 11.65 90 Tablet 4    aspirin delayed-release 81 mg tablet Take 81 mg by mouth daily.  cetirizine (ZYRTEC) 10 mg tablet Take 10 mg by mouth daily. No current facility-administered medications on file prior to visit. No Known Allergies  Review of Systems   All other systems reviewed and are negative. Objective:     Vitals:    03/07/22 0724   BP: 133/73   Pulse: 74   Resp: 16   Temp: 97.3 °F (36.3 °C)   TempSrc: Axillary   SpO2: 99%   Weight: 170 lb (77.1 kg)   Height: 5' 7\" (1.702 m)     Physical Exam  Vitals reviewed. HENT:      Head: Normocephalic and atraumatic. Cardiovascular:      Rate and Rhythm: Normal rate and regular rhythm. Heart sounds: Normal heart sounds. Pulmonary:      Effort: Pulmonary effort is normal.      Breath sounds: Normal breath sounds. Neurological:      Mental Status: He is oriented to person, place, and time. Psychiatric:         Behavior: Behavior normal.            Assessment/Plan:       1. Elevated blood pressure reading without diagnosis of hypertension     -Blood pressure mildly elevated at home systolic blood pressure in the 140s in the 150s occasionally. BP normal today. Instructed to keep BP check and follow-up in 1 month. 2. SIOBHAN (obstructive sleep apnea)  -Recently diagnosed. Scheduled see dentist f to discuss a mouthpiece. 3. Prostate cancer ([facility] [address]  - Low-grade prostate cancer. Follows with urology. No treatment. Currently under surveillance protocol. Reviewed notes on EMR. Follow-up and Dispositions    · Return in about 4 weeks (around 4/4/2022) for Elevated Blood Pressure.           Judy Plummer MD

## 2022-03-07 NOTE — PROGRESS NOTES
Chief Complaint   Patient presents with    Follow-up     HBP     Visit Vitals  /73 (BP 1 Location: Left upper arm, BP Patient Position: Sitting)   Pulse 74   Temp 97.3 °F (36.3 °C) (Axillary)   Resp 16   Ht 5' 7\" (1.702 m)   Wt 170 lb (77.1 kg)   SpO2 99%   BMI 26.63 kg/m²     1. Have you been to the ER, urgent care clinic since your last visit? Hospitalized since your last visit? No    2. Have you seen or consulted any other health care providers outside of the [address] [place] [address] since your last visit? Include any pap smears or colon screening.  No

## 2022-03-09 RX ORDER — PRAVASTATIN SODIUM 20 MG/1
TABLET ORAL
Qty: 90 TABLET | Refills: 4 | Status: SHIPPED | OUTPATIENT
Start: 2022-03-09

## 2022-03-18 PROBLEM — C61 PROSTATE CANCER (HCC): Status: ACTIVE | Noted: 2022-03-07

## 2022-03-19 PROBLEM — E78.2 MIXED HYPERLIPIDEMIA: Status: ACTIVE | Noted: 2017-05-22

## 2022-03-19 PROBLEM — G47.33 OSA (OBSTRUCTIVE SLEEP APNEA): Status: ACTIVE | Noted: 2022-03-07

## 2022-03-19 PROBLEM — I10 ESSENTIAL HYPERTENSION: Status: ACTIVE | Noted: 2017-05-22

## 2022-03-24 PROCEDURE — 3051F HG A1C>EQUAL 7.0%<8.0%: CPT | Performed by: INTERNAL MEDICINE

## 2022-03-24 RX ORDER — GLIPIZIDE 5 MG/1
TABLET ORAL
Qty: 360 TABLET | Refills: 3 | Status: SHIPPED | OUTPATIENT
Start: 2022-03-24

## 2022-04-01 RX ORDER — PIOGLITAZONEHYDROCHLORIDE 15 MG/1
TABLET ORAL
Qty: 90 TABLET | Refills: 1 | Status: SHIPPED | OUTPATIENT
Start: 2022-04-01 | End: 2022-04-04

## 2022-04-04 ENCOUNTER — OFFICE VISIT (OUTPATIENT)
Dept: FAMILY MEDICINE CLINIC | Age: 77
End: 2022-04-04
Payer: MEDICARE

## 2022-04-04 VITALS
HEIGHT: 67 IN | DIASTOLIC BLOOD PRESSURE: 72 MMHG | OXYGEN SATURATION: 98 % | SYSTOLIC BLOOD PRESSURE: 138 MMHG | RESPIRATION RATE: 16 BRPM | WEIGHT: 170 LBS | HEART RATE: 73 BPM | TEMPERATURE: 97.3 F | BODY MASS INDEX: 26.68 KG/M2

## 2022-04-04 DIAGNOSIS — I10 ESSENTIAL HYPERTENSION: Primary | ICD-10-CM

## 2022-04-04 DIAGNOSIS — Z12.11 SCREEN FOR COLON CANCER: ICD-10-CM

## 2022-04-04 PROCEDURE — 3017F COLORECTAL CA SCREEN DOC REV: CPT | Performed by: STUDENT IN AN ORGANIZED HEALTH CARE EDUCATION/TRAINING PROGRAM

## 2022-04-04 PROCEDURE — 99213 OFFICE O/P EST LOW 20 MIN: CPT | Performed by: STUDENT IN AN ORGANIZED HEALTH CARE EDUCATION/TRAINING PROGRAM

## 2022-04-04 PROCEDURE — G8427 DOCREV CUR MEDS BY ELIG CLIN: HCPCS | Performed by: STUDENT IN AN ORGANIZED HEALTH CARE EDUCATION/TRAINING PROGRAM

## 2022-04-04 PROCEDURE — G8754 DIAS BP LESS 90: HCPCS | Performed by: STUDENT IN AN ORGANIZED HEALTH CARE EDUCATION/TRAINING PROGRAM

## 2022-04-04 PROCEDURE — G8419 CALC BMI OUT NRM PARAM NOF/U: HCPCS | Performed by: STUDENT IN AN ORGANIZED HEALTH CARE EDUCATION/TRAINING PROGRAM

## 2022-04-04 PROCEDURE — G8752 SYS BP LESS 140: HCPCS | Performed by: STUDENT IN AN ORGANIZED HEALTH CARE EDUCATION/TRAINING PROGRAM

## 2022-04-04 PROCEDURE — G8536 NO DOC ELDER MAL SCRN: HCPCS | Performed by: STUDENT IN AN ORGANIZED HEALTH CARE EDUCATION/TRAINING PROGRAM

## 2022-04-04 PROCEDURE — 1101F PT FALLS ASSESS-DOCD LE1/YR: CPT | Performed by: STUDENT IN AN ORGANIZED HEALTH CARE EDUCATION/TRAINING PROGRAM

## 2022-04-04 PROCEDURE — G8510 SCR DEP NEG, NO PLAN REQD: HCPCS | Performed by: STUDENT IN AN ORGANIZED HEALTH CARE EDUCATION/TRAINING PROGRAM

## 2022-04-04 NOTE — PROGRESS NOTES
Subjective:     Chief Complaint   Patient presents with    Hypertension     HPI:  Zechariah Zambrano is a 77 y.o. male with past medical history of diabetes mellitus who is here follow-up elevated blood pressure. Last visit his blood pressure in the office was normal, but blood pressure at home had been in the 140s to 150s. Has been keeping blood pressure log, and blood pressure has been the grand majority of times in normal limits. Blood pressure log below. Denies any CP or SOB. Currently not on blood pressure medication. Follows with endocrinology for DM. Was like his care gaps to be updated on the chart. Luiza -2 years ago. He is due. Would like Cologuasamaria ordered. Patient Active Problem List    Diagnosis    Prostate cancer ([facility] [address]     Low-grade prostate cancer. Follows with urology. No treatment. Surveillance protocol.  SIOBHAN (obstructive sleep apnea)     Recently diagnosed. Scheduled to see a dentist for mouthpiece.  Uncontrolled type 2 diabetes mellitus with complication, without long-term current use of insulin     Follows with Endo      Essential hypertension    Mixed hyperlipidemia     Past Medical History:   Diagnosis Date    Allergies     Arthritis     DM (diabetes mellitus) ([facility] [address] [facility]      Family History   Problem Relation Age of Onset    Cancer Father     Alzheimer's Disease Father     Diabetes Mother     Diabetes Maternal Grandmother     Diabetes Maternal Grandfather       reports that he has never smoked.  He has never used smokeless tobacco.  Current Outpatient Medications on File Prior to Visit   Medication Sig Dispense Refill    glipiZIDE (GLUCOTROL) 5 mg tablet TAKE 2 TABLETS BY MOUTH TWICE DAILY 20 MINUTES BEFORE BREAKFAST AND DINNER 360 Tablet 3    pravastatin (PRAVACHOL) 20 mg tablet TAKE 1 TABLET BY MOUTH EVERY NIGHT 90 Tablet 4    pioglitazone (ACTOS) 30 mg tablet One pill a  Day   Stop  15 mg dose 90 Tablet 3    metFORMIN (GLUCOPHAGE) 500 mg tablet TAKE 2 TABLETS BY MOUTH TWICE A DAY WITH MEALS 360 Tablet 3    SITagliptin (Januvia) 100 mg tablet TAKE 1 TAB BY MOUTH DAILY. E 11.65 90 Tablet 4    aspirin delayed-release 81 mg tablet Take 81 mg by mouth daily.  cetirizine (ZYRTEC) 10 mg tablet Take 10 mg by mouth daily.  [DISCONTINUED] pioglitazone (ACTOS) 15 mg tablet TAKE 1 TABLET BY MOUTH DAILY 90 Tablet 1     No current facility-administered medications on file prior to visit. No Known Allergies  Review of Systems   All other systems reviewed and are negative. Objective:     Vitals:    04/04/22 0738 04/04/22 0810   BP: (!) 143/71 138/72   Pulse: 73    Resp: 16    Temp: 97.3 °F (36.3 °C)    TempSrc: Axillary    SpO2: 98%    Weight: 170 lb (77.1 kg)    Height: 5' 7\" (1.702 m)    L  Physical Exam  Vitals reviewed. HENT:      Head: Normocephalic and atraumatic. Cardiovascular:      Rate and Rhythm: Normal rate. Pulmonary:      Effort: Pulmonary effort is normal.   Neurological:      Mental Status: He is oriented to person, place, and time. Psychiatric:         Behavior: Behavior normal.            Assessment/Plan:         1. Essential hypertension   -BP within normal limits today on recheck, and normal at home. Continue off BP medication for now. Continue to monitor at home. 2. Screen for colon cancer  -     COLOGUARD TEST (FECAL DNA COLORECTAL CANCER SCREENING); Future    HCM/care gaps updated on chart      Follow-up and Dispositions    · Return in about 1 year (around 4/4/2023) for Wellness.             Krista Garcia MD

## 2022-04-04 NOTE — PROGRESS NOTES
Chief Complaint   Patient presents with    Hypertension     Visit Vitals  BP (!) 143/71 (BP 1 Location: Left upper arm, BP Patient Position: Sitting)   Pulse 73   Temp 97.3 °F (36.3 °C) (Axillary)   Resp 16   Ht 5' 7\" (1.702 m)   Wt 170 lb (77.1 kg)   SpO2 98%   BMI 26.63 kg/m²     1. Have you been to the ER, urgent care clinic since your last visit? Hospitalized since your last visit? No    2. Have you seen or consulted any other health care providers outside of the [address] [place] [address] since your last visit? Include any pap smears or colon screening.  No

## 2022-07-06 RX ORDER — SITAGLIPTIN 100 MG/1
TABLET, FILM COATED ORAL
Qty: 90 TABLET | Refills: 2 | Status: SHIPPED | OUTPATIENT
Start: 2022-07-06 | End: 2022-08-08 | Stop reason: SDUPTHER

## 2022-07-14 ENCOUNTER — TELEPHONE (OUTPATIENT)
Dept: FAMILY MEDICINE CLINIC | Age: 77
End: 2022-07-14

## 2022-07-14 NOTE — TELEPHONE ENCOUNTER
----- Message from ALKILU [employer] sent at 7/14/2022  1:45 PM EDT -----  Subject: Message to Provider    QUESTIONS  Information for Provider? Requisitions were faxed on June 21, 24, and 27th   and confirmed receipt on 6/28, add'l requisitions were sent July 7th. Tez Ortega from [address] [place] [address] is in need of a medical decision. Please   complete each signature page (Ref# [id number]) and return by fax to Luz Marina   attn at [phone].  ---------------------------------------------------------------------------  --------------  [address] [employer]  [phone]; OK to leave message on voicemail  ---------------------------------------------------------------------------  --------------  SCRIPT ANSWERS  Relationship to Patient? Third Party  Third Party Type? Other  Other Third Party Type? Laboratory   Representative Name?  Tez Ortega

## 2022-08-08 ENCOUNTER — OFFICE VISIT (OUTPATIENT)
Dept: ENDOCRINOLOGY | Age: 77
End: 2022-08-08
Payer: MEDICARE

## 2022-08-08 VITALS
WEIGHT: 163 LBS | BODY MASS INDEX: 25.58 KG/M2 | HEART RATE: 85 BPM | OXYGEN SATURATION: 97 % | DIASTOLIC BLOOD PRESSURE: 59 MMHG | HEIGHT: 67 IN | TEMPERATURE: 97.4 F | SYSTOLIC BLOOD PRESSURE: 117 MMHG

## 2022-08-08 DIAGNOSIS — E11.65 TYPE 2 DIABETES MELLITUS WITH HYPERGLYCEMIA, WITHOUT LONG-TERM CURRENT USE OF INSULIN (HCC): ICD-10-CM

## 2022-08-08 DIAGNOSIS — I10 ESSENTIAL HYPERTENSION: Primary | ICD-10-CM

## 2022-08-08 DIAGNOSIS — E78.2 MIXED HYPERLIPIDEMIA: ICD-10-CM

## 2022-08-08 PROCEDURE — 1123F ACP DISCUSS/DSCN MKR DOCD: CPT | Performed by: INTERNAL MEDICINE

## 2022-08-08 PROCEDURE — 3051F HG A1C>EQUAL 7.0%<8.0%: CPT | Performed by: INTERNAL MEDICINE

## 2022-08-08 PROCEDURE — 3017F COLORECTAL CA SCREEN DOC REV: CPT | Performed by: INTERNAL MEDICINE

## 2022-08-08 PROCEDURE — 99214 OFFICE O/P EST MOD 30 MIN: CPT | Performed by: INTERNAL MEDICINE

## 2022-08-08 PROCEDURE — 2022F DILAT RTA XM EVC RTNOPTHY: CPT | Performed by: INTERNAL MEDICINE

## 2022-08-08 PROCEDURE — G8427 DOCREV CUR MEDS BY ELIG CLIN: HCPCS | Performed by: INTERNAL MEDICINE

## 2022-08-08 PROCEDURE — G8536 NO DOC ELDER MAL SCRN: HCPCS | Performed by: INTERNAL MEDICINE

## 2022-08-08 PROCEDURE — 1101F PT FALLS ASSESS-DOCD LE1/YR: CPT | Performed by: INTERNAL MEDICINE

## 2022-08-08 PROCEDURE — G8752 SYS BP LESS 140: HCPCS | Performed by: INTERNAL MEDICINE

## 2022-08-08 PROCEDURE — G8510 SCR DEP NEG, NO PLAN REQD: HCPCS | Performed by: INTERNAL MEDICINE

## 2022-08-08 PROCEDURE — G8754 DIAS BP LESS 90: HCPCS | Performed by: INTERNAL MEDICINE

## 2022-08-08 PROCEDURE — G8417 CALC BMI ABV UP PARAM F/U: HCPCS | Performed by: INTERNAL MEDICINE

## 2022-08-08 RX ORDER — SITAGLIPTIN 100 MG/1
TABLET, FILM COATED ORAL
Qty: 90 TABLET | Refills: 2
Start: 2022-08-08

## 2022-08-08 NOTE — PROGRESS NOTES
HISTORY OF PRESENT ILLNESS  Suzette Zambrano is a 77 y.o. male. HPI  Patient here for f/u   After  visit of Type 2 diabetes mellitus from  Feb 2022      Lost 8 to 10 lbs   Doing TLc  Requesting fill of [employer] form       Feb 2022     Gained  4 lbs   No meter, dislikes checks     August 2021     Pt has been doing well   He does not like checking sugars-   Compliant with meds   Requesting form fill up for [employer] 2 lbs         Referred : by pcp    H/o diabetes for 10  years   Current A1C is 8.7 %   From   March 8 2017  and symptoms/problems include fluctuant sugars  and polyuria   Current diabetic medications include glipizide xr 2.5 mg a day and metformin. Cardiovascular risk factors: dyslipidemia, diabetes mellitus, obesity, male gender, hypertension, stress      Review of Systems   Constitutional: Negative. Neurological: Negative. Physical Exam   Constitutional: He is oriented to person, place, and time. He appears well-developed and well-nourished.    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      Lab Results   Component Value Date/Time    Hemoglobin A1c 7.1 (H) 08/02/2022 09:15 AM    Hemoglobin A1c 7.5 (H) 02/01/2022 09:22 AM    Hemoglobin A1c 7.4 (H) 08/02/2021 12:00 AM    Hemoglobin A1c, External 8.9 02/25/2020 12:00 AM    Hemoglobin A1c, External 7.9 02/13/2019 12:00 AM    Hemoglobin A1c, External 6.9 05/15/2018 12:00 AM    Glucose 121 (H) 08/02/2022 09:15 AM    Microalbumin/Creat ratio (mg/g creat) 32 (H) 02/01/2022 09:22 AM    Microalbumin,urine random 4.36 02/01/2022 09:22 AM    LDL, calculated 68.8 08/02/2022 09:15 AM    Creatinine 1.05 08/02/2022 09:15 AM      Lab Results   Component Value Date/Time    Cholesterol, total 130 08/02/2022 09:15 AM    HDL Cholesterol 44 08/02/2022 09:15 AM    LDL, calculated 68.8 08/02/2022 09:15 AM    LDL-C, External 86 02/25/2020 12:00 AM    Triglyceride 86 08/02/2022 09:15 AM    CHOL/HDL Ratio 3.0 08/02/2022 09:15 AM ASSESSMENT and PLAN    1. Type 2 DM uncontrolled : a1c is 7.1 %    from  august 2022  compared to    7.5%    From    Feb 2022   Compared to   7.4%       From august 2021   Compared to     7.6 %     From     Feb 2021   compared to    7.5 %     From     July 2020    Compared to   7.4 %     From   Nov 2019   Compared to    7.4 %   From     May 2019       Compared to  7.9 %    From   Feb 2019    Compared to    6.9 %    From   May 2018         August 2022   ok to not check sugars as his control is stable   Stay on metformin  And , januvia and glipizide 10 mg bid , increase  actos to 30  mg a day     Feb 2022   ok to not check sugars as his control is stable   Stay on metformin  And , januvia and glipizide 10 mg bid , increase  actos to 30  mg a day     August 2021   [id number] to not check sugars as his control is stable   Stay on metformin  And , januvia and glipizide 10 mg bid , added actos 6 months ago 15 mg a day   Applying for patient assistance for januvia       2. Hypoglycemia :  Educated on treating the hypoglycemia. 3. HTN : controlled,  not on any ACEI or ARB, does not have proteinuria . 4. Dyslipidemia : lipids are good ,  on pravachol 20 mg at night   Patient is educated about benefits and adverse effects of statins and explained how benefits outweigh risk. 5. 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 Joana Dandy      6.  Elevated PSA - f/u regularly       Follow-up in 6 months          Reviewed results with patient and discussed the labs being ordered today/bnv  Patient voiced understanding of plan of care

## 2022-08-08 NOTE — PATIENT INSTRUCTIONS
SPECIFIC INSTRUCTIONS BELOW       Stay  on pravachol 20 mg at night       Stay  on  metformin  500 mg to 2 pills  twice a day with meals     continue  On januvia 100 mg a day  ( pt assistance )      Glipizide 5 mg to 2 pills   twice  A day , twenty minutes before b-fast and dinner       Do not take it if you are not eating   Cut the pill to half if eating lesser than normal   Do not avoid lunches         pioglitazone  30  mg a day                   -------------PAY ATTENTION TO THESE GENERAL INSTRUCTIONS -----------------      - The medications prescribed at this visit will not be available at pharmacy until 6 pm       - YOUR MED LIST IS NOT UP TO DATE AS SOME CHANGES ARE BEING MADE AFTER THE VISIT - FOLLOW SPECIFIC INSTRUCTIONS  ABOVE     -ANY tests other than blood work, which you opt to do  outside the  [facility] imaging facilities, you are responsible for prior authorizations if  required    - [address] UP TO DATE ON YOUR AVS- PLEASE IGNORE     Results     *Normal results will not be notified by a phone call starting January 1 2021   *If you have an upcoming visit, the results will be discussed at the visit   *Please sign up for MY CHART if you want access to your lab and test results  *Abnormal results which require immediate attention will be notified by phone call   *Abnormal results which do not require immediate assistance will be notified in 1-2 weeks       Refills    -    have your pharmacy send us a refill request . Refills are done max for one year and a visit is a must before refills are extended    Follow up appointments -  highly encourage you to make it when you are checking out. We can accommodate you into the schedule based on your clinical situation, but not for extending refills beyond a year. Labs are important to give refills and is important to get labs before the visit     Phone calls  -  Allow  24 hrs.  for non-urgent calls to be returned  Prior authorization - It may take 2-4 weeks to process  Forms  -  FMLA, DMV etc., will take up to 2 weeks to process  Cancellations - please notify the office 2 days in advance   Samples  - will only be dispensed at visits       If not showing for the appointments and cancelling appointments within 24 hours are kept track of and three  of such situations in  two consecutive years will likely be considered for termination from the practice    -------------------------------------------------------------------------------------------------------------------

## 2022-08-08 NOTE — PROGRESS NOTES
1. Have you been to the ER, urgent care clinic since your last visit? Hospitalized since your last visit? No    2. Have you seen or consulted any other health care providers outside of the [address] [place] [address] since your last visit? Include any pap smears or colon screening.  No    Chief Complaint   Patient presents with    Follow-up    Diabetes    Cholesterol Problem    Hypertension     Visit Vitals  BP (!) 117/59 (BP 1 Location: Left upper arm, BP Patient Position: Sitting, BP Cuff Size: Adult)   Pulse 85   Temp 97.4 °F (36.3 °C) (Temporal)   Ht 5' 7\" (1.702 m)   Wt 163 lb (73.9 kg)   SpO2 97%   BMI 25.53 kg/m²

## 2022-08-08 NOTE — LETTER
8/8/2022    Patient: Radha Armstrong Form   YOB: 1945   Date of Visit: 8/8/2022     Aniceto Dyer MD  [address] [address]  [address] [facility] [address] [facility]    Dear Aniceto Dyer MD,      Thank you for referring Mr. Fantasma Patel Form to [address] [address] for evaluation. My notes for this consultation are attached. If you have questions, please do not hesitate to call me. I look forward to following your patient along with you.       Sincerely,    Wing Shaw MD

## 2022-08-10 ENCOUNTER — TELEPHONE (OUTPATIENT)
Dept: FAMILY MEDICINE CLINIC | Age: 77
End: 2022-08-10

## 2022-08-10 NOTE — TELEPHONE ENCOUNTER
Reason for call: [employer] calling--they are wanting the note from 7/14 faxed to them. They are trying to make sure everything was documented correctly.     [employer] Fax: [phone]    Is this a new problem: yes     Date of last appointment:  4/4/2022     Can we respond via [employer]: no    Best call back number: [phone]

## 2022-08-30 NOTE — TELEPHONE ENCOUNTER
Jacquelin Bustillos is calling wanting the attestation form for this patient with diagnosis codes and signature. Please fax form to [phone]. They did receive the chart notes, they just need the attestation form.

## 2022-09-12 ENCOUNTER — TELEPHONE (OUTPATIENT)
Dept: FAMILY MEDICINE CLINIC | Age: 77
End: 2022-09-12

## 2022-09-13 NOTE — TELEPHONE ENCOUNTER
Spoke with patient ref the [address] [place] [address] requesting his info. Patient does not want anything going out to them. He will call them today!

## 2022-09-20 RX ORDER — METFORMIN HYDROCHLORIDE 500 MG/1
TABLET ORAL
Qty: 360 TABLET | Refills: 3 | Status: SHIPPED | OUTPATIENT
Start: 2022-09-20

## 2022-09-20 NOTE — TELEPHONE ENCOUNTER
[employer] called again in regards to this attestation . I advised her that the nurse spoke with the pt and he was going to be calling them as he did not want any info released to them . She said the pt already had the testing done and that we already sent them the chart note that was requested . She said the form is for the provider as he signed off on the order for this  . I advised her I would have to send a message to the clinical staff to clarify this.      [phone]

## 2022-11-09 ENCOUNTER — TELEPHONE (OUTPATIENT)
Dept: ENDOCRINOLOGY | Age: 77
End: 2022-11-09

## 2022-11-09 NOTE — TELEPHONE ENCOUNTER
Dropped off patient assistance form informed him to give at the least 7 days he will be contacted when done.  Put in nursing folder he will  once called

## 2023-01-03 RX ORDER — GLIPIZIDE 5 MG/1
TABLET ORAL
Qty: 360 TABLET | Refills: 3 | Status: SHIPPED | OUTPATIENT
Start: 2023-01-03

## 2023-01-03 RX ORDER — METFORMIN HYDROCHLORIDE 500 MG/1
TABLET ORAL
Qty: 360 TABLET | Refills: 3 | Status: SHIPPED | OUTPATIENT
Start: 2023-01-03

## 2023-01-03 RX ORDER — PIOGLITAZONEHYDROCHLORIDE 30 MG/1
TABLET ORAL
Qty: 90 TABLET | Refills: 3 | Status: SHIPPED | OUTPATIENT
Start: 2023-01-03

## 2023-02-03 LAB
ALBUMIN SERPL-MCNC: 4 G/DL (ref 3.5–5)
ALBUMIN/GLOB SERPL: 1.2 (ref 1.1–2.2)
ALP SERPL-CCNC: 64 U/L (ref 45–117)
ALT SERPL-CCNC: 26 U/L (ref 12–78)
ANION GAP SERPL CALC-SCNC: 3 MMOL/L (ref 5–15)
AST SERPL-CCNC: 15 U/L (ref 15–37)
BILIRUB SERPL-MCNC: 0.6 MG/DL (ref 0.2–1)
BUN SERPL-MCNC: 22 MG/DL (ref 6–20)
BUN/CREAT SERPL: 20 (ref 12–20)
CALCIUM SERPL-MCNC: 9.5 MG/DL (ref 8.5–10.1)
CHLORIDE SERPL-SCNC: 105 MMOL/L (ref 97–108)
CHOLEST SERPL-MCNC: 148 MG/DL
CO2 SERPL-SCNC: 30 MMOL/L (ref 21–32)
CREAT SERPL-MCNC: 1.09 MG/DL (ref 0.7–1.3)
CREAT UR-MCNC: 174 MG/DL
EST. AVERAGE GLUCOSE BLD GHB EST-MCNC: 180 MG/DL
GLOBULIN SER CALC-MCNC: 3.3 G/DL (ref 2–4)
GLUCOSE SERPL-MCNC: 189 MG/DL (ref 65–100)
HBA1C MFR BLD: 7.9 % (ref 4–5.6)
HDLC SERPL-MCNC: 51 MG/DL
HDLC SERPL: 2.9 (ref 0–5)
LDLC SERPL CALC-MCNC: 77 MG/DL (ref 0–100)
MICROALBUMIN UR-MCNC: 6.55 MG/DL
MICROALBUMIN/CREAT UR-RTO: 38 MG/G (ref 0–30)
POTASSIUM SERPL-SCNC: 4.8 MMOL/L (ref 3.5–5.1)
PROT SERPL-MCNC: 7.3 G/DL (ref 6.4–8.2)
SODIUM SERPL-SCNC: 138 MMOL/L (ref 136–145)
TRIGL SERPL-MCNC: 100 MG/DL (ref ?–150)
VLDLC SERPL CALC-MCNC: 20 MG/DL

## 2023-02-08 ENCOUNTER — OFFICE VISIT (OUTPATIENT)
Dept: ENDOCRINOLOGY | Age: 78
End: 2023-02-08
Payer: MEDICARE

## 2023-02-08 VITALS
HEART RATE: 82 BPM | BODY MASS INDEX: 27.06 KG/M2 | DIASTOLIC BLOOD PRESSURE: 60 MMHG | TEMPERATURE: 97.6 F | HEIGHT: 67 IN | SYSTOLIC BLOOD PRESSURE: 133 MMHG | WEIGHT: 172.4 LBS | OXYGEN SATURATION: 99 %

## 2023-02-08 DIAGNOSIS — I10 ESSENTIAL HYPERTENSION: ICD-10-CM

## 2023-02-08 DIAGNOSIS — E78.2 MIXED HYPERLIPIDEMIA: ICD-10-CM

## 2023-02-08 DIAGNOSIS — E11.65 TYPE 2 DIABETES MELLITUS WITH HYPERGLYCEMIA, WITHOUT LONG-TERM CURRENT USE OF INSULIN (HCC): Primary | ICD-10-CM

## 2023-02-08 PROCEDURE — G8432 DEP SCR NOT DOC, RNG: HCPCS | Performed by: INTERNAL MEDICINE

## 2023-02-08 PROCEDURE — 1123F ACP DISCUSS/DSCN MKR DOCD: CPT | Performed by: INTERNAL MEDICINE

## 2023-02-08 PROCEDURE — G8536 NO DOC ELDER MAL SCRN: HCPCS | Performed by: INTERNAL MEDICINE

## 2023-02-08 PROCEDURE — 3078F DIAST BP <80 MM HG: CPT | Performed by: INTERNAL MEDICINE

## 2023-02-08 PROCEDURE — 1101F PT FALLS ASSESS-DOCD LE1/YR: CPT | Performed by: INTERNAL MEDICINE

## 2023-02-08 PROCEDURE — 3051F HG A1C>EQUAL 7.0%<8.0%: CPT | Performed by: INTERNAL MEDICINE

## 2023-02-08 PROCEDURE — 99214 OFFICE O/P EST MOD 30 MIN: CPT | Performed by: INTERNAL MEDICINE

## 2023-02-08 PROCEDURE — G8417 CALC BMI ABV UP PARAM F/U: HCPCS | Performed by: INTERNAL MEDICINE

## 2023-02-08 PROCEDURE — G8427 DOCREV CUR MEDS BY ELIG CLIN: HCPCS | Performed by: INTERNAL MEDICINE

## 2023-02-08 PROCEDURE — 3075F SYST BP GE 130 - 139MM HG: CPT | Performed by: INTERNAL MEDICINE

## 2023-02-08 NOTE — PROGRESS NOTES
Darren Zambrano is a 69 y.o. male here for   Chief Complaint   Patient presents with    Diabetes       1. Have you been to the ER, urgent care clinic since your last visit? Hospitalized since your last visit? - No     2. Have you seen or consulted any other health care providers outside of the [address] [place] [address] since your last visit? Include any pap smears or colon screening.- Urologist [facility] last week .

## 2023-02-08 NOTE — LETTER
2/8/2023    Patient: Bee Thornton Form   YOB: 1945   Date of Visit: 2/8/2023     Hans Wade MD  [address] [address]  [address] [id number] [facility]    Dear Hans Wade MD,      Thank you for referring Mr. Melony Zambrano to [address] [address] [address] for evaluation. My notes for this consultation are attached. If you have questions, please do not hesitate to call me. I look forward to following your patient along with you.       Sincerely,    Diana Aggarwal MD

## 2023-02-08 NOTE — PROGRESS NOTES
Yasmani Kurtz MD FACE         HISTORY OF PRESENT ILLNESS  Maxwell Zambrano is a 69 y.o. male. HPI  Patient here for f/u   After  visit of Type 2 diabetes mellitus from   august 2022      No meter   He did not walk he says since July 2022, after wife had knee surgery   Gained 9 lbs ( winter wear )        August 2022       Lost 8 to 10 lbs   Doing TLc  Requesting fill of [employer] form       Feb 2022     Gained  4 lbs   No meter, dislikes checks     August 2021     Pt has been doing well   He does not like checking sugars-   Compliant with meds   Requesting form fill up for [employer] 2 lbs         Referred : by pcp    H/o diabetes for 10  years   Current A1C is 8.7 %   From   March 8 2017  and symptoms/problems include fluctuant sugars  and polyuria   Current diabetic medications include glipizide xr 2.5 mg a day and metformin. Cardiovascular risk factors: dyslipidemia, diabetes mellitus, obesity, male gender, hypertension, stress      Review of Systems   Constitutional: Negative. Neurological: Negative. Physical Exam   Constitutional: He is oriented to person, place, and time. He appears well-developed and well-nourished.    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      Lab Results   Component Value Date/Time    Hemoglobin A1c 7.9 (H) 02/01/2023 08:58 AM    Hemoglobin A1c 7.1 (H) 08/02/2022 09:15 AM    Hemoglobin A1c 7.5 (H) 02/01/2022 09:22 AM    Hemoglobin A1c, External 8.9 02/25/2020 12:00 AM    Hemoglobin A1c, External 7.9 02/13/2019 12:00 AM    Hemoglobin A1c, External 6.9 05/15/2018 12:00 AM    Glucose 189 (H) 02/01/2023 08:58 AM    Microalbumin/Creat ratio (mg/g creat) 38 (H) 02/01/2023 08:58 AM    Microalbumin,urine random 6.55 02/01/2023 08:58 AM    LDL, calculated 77 02/01/2023 08:58 AM    Creatinine 1.09 02/01/2023 08:58 AM      Lab Results   Component Value Date/Time    Cholesterol, total 148 02/01/2023 08:58 AM    HDL Cholesterol 51 02/01/2023 08:58 AM    LDL, calculated 77 02/01/2023 08:58 AM    LDL-C, External 86 02/25/2020 12:00 AM    Triglyceride 100 02/01/2023 08:58 AM    CHOL/HDL Ratio 2.9 02/01/2023 08:58 AM       ASSESSMENT and PLAN    1. Type 2 DM uncontrolled : a1c is   7.9 %   from     feb 2023    compared to  7.1 %    from  august 2022  compared to    7.5%    From    Feb 2022   Compared to   7.4%       From august 2021   Compared to     7.6 %     From     Feb 2021   compared to    7.5 %     From     July 2020    Compared to   7.4 %     From   Nov 2019   Compared to    7.4 %   From     May 2019       Compared to  7.9 %    From   Feb 2019    Compared to    6.9 %    From   May 2018           Feb 2023   Lost  control, agrees to diet non compliance   He is on max does of meds   Stay on metformin  And , januvia and glipizide 10 mg bid ,  actos to 30  mg a day   ( he  is on [employer] [place]  @  [employer] pt assistance )  It will be nice if he exercises , does diet  and checks sugars by rotation     August 2022   ok to not check sugars as his control is stable   Stay on metformin  And , januvia and glipizide 10 mg bid , increase  actos to 30  mg a day       2. Hypoglycemia :  Educated on treating the hypoglycemia. 3. HTN : controlled,  not on any ACEI or ARB, on and off  he has proteinuria . Discussed glycemic control being the first imp thing to do, then  adding ACEi  and then  SGL2. 4.  Dyslipidemia : lipids are good ,  on pravachol 20 mg at night   Patient is educated about benefits and adverse effects of statins and explained how benefits outweigh risk. 5. 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 Gadiel Dailey      6. Elevated PSA - f/up  with urology  regularly       7.  Eye visit he does follow up regularly - no retinopathy           Follow-up in 6 months          Reviewed results with patient and discussed the labs being ordered today/bnv  Patient voiced understanding of plan of care

## 2023-02-08 NOTE — PATIENT INSTRUCTIONS
SPECIFIC INSTRUCTIONS BELOW     Stay  on pravachol 20 mg at night       Stay  on  metformin  500 mg to 2 pills  twice a day with meals     continue  On januvia 100 mg a day  ( pt assistance )      Glipizide 5 mg  2 pills   twice  A day , twenty minutes before b-fast and dinner       Do not take it if you are not eating   Cut the pill to half if eating lesser than normal   Do not avoid lunches         pioglitazone  30  mg a day           -------------PAY ATTENTION TO THESE GENERAL INSTRUCTIONS -----------------      - The medications prescribed at this visit will not be available at pharmacy until 6 pm       - YOUR MED LIST IS NOT UP TO DATE AS SOME CHANGES ARE BEING MADE AFTER THE VISIT - FOLLOW SPECIFIC INSTRUCTIONS  ABOVE     -ANY tests other than blood work, which you opt to do  outside the  [facility] imaging facilities, you are responsible for prior authorizations if  required    - [address] UP TO DATE ON YOUR AVS- PLEASE IGNORE     Results     *Normal results will not be notified by a phone call starting January 1 2021   *If you have an upcoming visit, the results will be discussed at the visit   *Please sign up for MY CHART if you want access to your lab and test results  *Abnormal results which require immediate attention will be notified by phone call   *Abnormal results which do not require immediate assistance will be notified in 1-2 weeks       Refills    -    have your pharmacy send us a refill request . Refills are done max for one year and a visit is a must before refills are extended    Follow up appointments -  highly encourage you to make it when you are checking out. We can accommodate you into the schedule based on your clinical situation, but not for extending refills beyond a year. Labs are important to give refills and is important to get labs before the visit     Phone calls  -  Allow  24 hrs.  for non-urgent calls to be returned  Prior authorization - It may take 2-4 weeks to process  Forms  -  FMLA, DMV etc., will take up to 2 weeks to process  Cancellations - please notify the office 2 days in advance   Samples  - will only be dispensed at visits       If not showing for the appointments and cancelling appointments within 24 hours are kept track of and three  of such situations in  two consecutive years will likely be considered for termination from the practice    -------------------------------------------------------------------------------------------------------------------

## 2023-04-03 RX ORDER — PRAVASTATIN SODIUM 20 MG/1
TABLET ORAL
Qty: 90 TABLET | Refills: 4 | Status: SHIPPED | OUTPATIENT
Start: 2023-04-03

## 2023-04-20 ENCOUNTER — OFFICE VISIT (OUTPATIENT)
Dept: FAMILY MEDICINE CLINIC | Age: 78
End: 2023-04-20
Payer: MEDICARE

## 2023-04-20 VITALS
HEIGHT: 67 IN | RESPIRATION RATE: 16 BRPM | BODY MASS INDEX: 26.68 KG/M2 | DIASTOLIC BLOOD PRESSURE: 62 MMHG | OXYGEN SATURATION: 98 % | TEMPERATURE: 97.3 F | WEIGHT: 170 LBS | SYSTOLIC BLOOD PRESSURE: 138 MMHG | HEART RATE: 78 BPM

## 2023-04-20 DIAGNOSIS — I10 ESSENTIAL HYPERTENSION: Primary | ICD-10-CM

## 2023-04-20 DIAGNOSIS — C61 PROSTATE CANCER (HCC): ICD-10-CM

## 2023-04-20 DIAGNOSIS — M54.2 NECK PAIN: ICD-10-CM

## 2023-04-20 DIAGNOSIS — E11.9 TYPE 2 DIABETES MELLITUS WITHOUT COMPLICATION, WITHOUT LONG-TERM CURRENT USE OF INSULIN (HCC): ICD-10-CM

## 2023-04-20 DIAGNOSIS — E78.2 MIXED HYPERLIPIDEMIA: ICD-10-CM

## 2023-04-20 PROCEDURE — G8427 DOCREV CUR MEDS BY ELIG CLIN: HCPCS | Performed by: STUDENT IN AN ORGANIZED HEALTH CARE EDUCATION/TRAINING PROGRAM

## 2023-04-20 PROCEDURE — 1123F ACP DISCUSS/DSCN MKR DOCD: CPT | Performed by: STUDENT IN AN ORGANIZED HEALTH CARE EDUCATION/TRAINING PROGRAM

## 2023-04-20 PROCEDURE — 99214 OFFICE O/P EST MOD 30 MIN: CPT | Performed by: STUDENT IN AN ORGANIZED HEALTH CARE EDUCATION/TRAINING PROGRAM

## 2023-04-20 PROCEDURE — G8417 CALC BMI ABV UP PARAM F/U: HCPCS | Performed by: STUDENT IN AN ORGANIZED HEALTH CARE EDUCATION/TRAINING PROGRAM

## 2023-04-20 PROCEDURE — G8510 SCR DEP NEG, NO PLAN REQD: HCPCS | Performed by: STUDENT IN AN ORGANIZED HEALTH CARE EDUCATION/TRAINING PROGRAM

## 2023-04-20 PROCEDURE — G8536 NO DOC ELDER MAL SCRN: HCPCS | Performed by: STUDENT IN AN ORGANIZED HEALTH CARE EDUCATION/TRAINING PROGRAM

## 2023-04-20 PROCEDURE — 3075F SYST BP GE 130 - 139MM HG: CPT | Performed by: STUDENT IN AN ORGANIZED HEALTH CARE EDUCATION/TRAINING PROGRAM

## 2023-04-20 PROCEDURE — 3078F DIAST BP <80 MM HG: CPT | Performed by: STUDENT IN AN ORGANIZED HEALTH CARE EDUCATION/TRAINING PROGRAM

## 2023-04-20 PROCEDURE — 3051F HG A1C>EQUAL 7.0%<8.0%: CPT | Performed by: STUDENT IN AN ORGANIZED HEALTH CARE EDUCATION/TRAINING PROGRAM

## 2023-04-20 PROCEDURE — 1101F PT FALLS ASSESS-DOCD LE1/YR: CPT | Performed by: STUDENT IN AN ORGANIZED HEALTH CARE EDUCATION/TRAINING PROGRAM

## 2023-04-20 NOTE — PROGRESS NOTES
Chief Complaint   Patient presents with    Follow-up     Visit Vitals  /62 (BP 1 Location: Left upper arm)   Pulse 78   Temp 97.3 °F (36.3 °C) (Axillary)   Resp 16   Ht 5' 7\" (1.702 m)   Wt 170 lb (77.1 kg)   SpO2 98%   BMI 26.63 kg/m²     1. \"Have you been to the ER, urgent care clinic since your last visit? Hospitalized since your last visit? \" No    2. \"Have you seen or consulted any other health care providers outside of the [address] [place] [address] since your last visit? \" No     3. For patients aged 39-70: Has the patient had a colonoscopy / FIT/ Cologuard? NA - based on age      If the patient is female:    4. For patients aged 41-77: Has the patient had a mammogram within the past 2 years? NA - based on age or sex      11. For patients aged 21-65: Has the patient had a pap smear?  NA - based on age or sex

## 2023-04-20 NOTE — PROGRESS NOTES
Subjective:     Chief Complaint   Patient presents with    Follow-up     HPI:  Roly Zambrano is a 69 y.o. male who is here for follow-up of chronic conditions. He has history of HTN, DM, HLD. Follows endocrinology last A1c was 7.9. He has proteinuria and they did discuss trying an ARB or ACE inhibitor eventually. Cholesterol is well controlled on most recent lipid panel. Blood pressure well controlled. He has history of prostate cancer and is currently followed urology. Has not undergone any treatment but is undergoing surveillance. Has had multiple MRIs in last couple months and has been no change. I reviewed urology notes. Complains of neck pain and is worried it may be carotid artery stenosis as his wife has blockage in her carotid arteries and had a stroke. No symptoms including dizziness, lightheadedness, weakness. Patient Active Problem List    Diagnosis    Type 2 diabetes mellitus    Prostate cancer (HCC)     Low-grade prostate cancer. Follows with urology. No treatment. Surveillance protocol. SIOBHAN (obstructive sleep apnea)     Recently diagnosed. Scheduled to see a dentist for mouthpiece. Uncontrolled type 2 diabetes mellitus with complication, without long-term current use of insulin     Follows with Endo      Essential hypertension    Mixed hyperlipidemia     Past Medical History:   Diagnosis Date    Allergies     Arthritis     DM (diabetes mellitus) ([facility] [address]      Family History   Problem Relation Age of Onset    Cancer Father         Lung cancer    Alzheimer's Disease Father     Diabetes Mother     Diabetes Maternal Grandmother     Diabetes Maternal Grandfather       reports that he has never smoked. He has never used smokeless tobacco. He reports that he does not drink alcohol and does not use drugs.   Current Outpatient Medications on File Prior to Visit   Medication Sig Dispense Refill    pravastatin (PRAVACHOL) 20 mg tablet TAKE ONE TABLET BY MOUTH EVERY EVENING 90 Tablet 4    glipiZIDE (GLUCOTROL) 5 mg tablet TAKE TWO TABLETS BY MOUTH TWICE A DAY TWENTY MINUTES BEFORE BREAKFAST AND DINNER STOP GLIPIZIDE  Tablet 3    metFORMIN (GLUCOPHAGE) 500 mg tablet TAKE TWO TABLETS BY MOUTH TWICE A DAY WITH MEALS 360 Tablet 3    pioglitazone (ACTOS) 30 mg tablet TAKE ONE TABLET BY MOUTH ONE TIME DAILY (STOP 15MG TABLETS) 90 Tablet 3    Januvia 100 mg tablet TAKE 1 TABLET BY MOUTH DAILY 90 Tablet 2    aspirin delayed-release 81 mg tablet Take 1 Tablet by mouth daily. cetirizine (ZYRTEC) 10 mg tablet Take 1 Tablet by mouth daily. No current facility-administered medications on file prior to visit. No Known Allergies  Review of Systems   All other systems reviewed and are negative. Objective:     Vitals:    04/20/23 0900   BP: 138/62   Pulse: 78   Resp: 16   Temp: 97.3 °F (36.3 °C)   TempSrc: Axillary   SpO2: 98%   Weight: 170 lb (77.1 kg)   Height: 5' 7\" (1.702 m)     Physical Exam  Vitals reviewed. HENT:      Head: Normocephalic and atraumatic. Cardiovascular:      Rate and Rhythm: Normal rate and regular rhythm. Heart sounds: Normal heart sounds. Pulmonary:      Effort: Pulmonary effort is normal.      Breath sounds: Normal breath sounds. Neurological:      Mental Status: He is oriented to person, place, and time. Psychiatric:         Behavior: Behavior normal.          Assessment/Plan:       ICD-10-CM ICD-9-CM    1. Essential hypertension  I10 401.9       2. Type 2 diabetes mellitus without complication, without long-term current use of insulin (HCC)  E11.9 250.00       3. Prostate cancer ([facility] [address]  C61 185       4. Mixed hyperlipidemia  E78.2 272.2       5. Neck pain  M54.2 723.1         Diagnoses and all orders for this visit:    1. Essential hypertension    2. Type 2 diabetes mellitus without complication, without long-term current use of insulin ([address]    3. Prostate cancer ([facility] [address]    4.  Mixed hyperlipidemia    Continue current management of chronic conditions including following with specialist.  Reviewed previous labs. Prostate cancer-follows with urology, and is stable. Currently under surveillance protocol. Reviewed urology notes. Neck pain-advised patient his neck pain is more likely MSK. I educated him that in that carotid artery stenosis does not cause pain. Follow-up and Dispositions    Return in about 1 year (around 4/20/2024) for Chronic Conditions, Prefrs to not do [employer].           Terry Hernandez MD

## 2023-04-22 DIAGNOSIS — E11.65 TYPE 2 DIABETES MELLITUS WITH HYPERGLYCEMIA, WITHOUT LONG-TERM CURRENT USE OF INSULIN (HCC): Primary | ICD-10-CM

## 2023-04-22 DIAGNOSIS — E78.2 MIXED HYPERLIPIDEMIA: ICD-10-CM

## 2023-04-23 DIAGNOSIS — E78.2 MIXED HYPERLIPIDEMIA: ICD-10-CM

## 2023-04-23 DIAGNOSIS — E11.65 TYPE 2 DIABETES MELLITUS WITH HYPERGLYCEMIA, WITHOUT LONG-TERM CURRENT USE OF INSULIN (HCC): Primary | ICD-10-CM

## 2023-04-24 DIAGNOSIS — E78.2 MIXED HYPERLIPIDEMIA: ICD-10-CM

## 2023-04-24 DIAGNOSIS — E11.65 TYPE 2 DIABETES MELLITUS WITH HYPERGLYCEMIA, WITHOUT LONG-TERM CURRENT USE OF INSULIN (HCC): Primary | ICD-10-CM

## 2023-05-12 SDOH — ECONOMIC STABILITY: FOOD INSECURITY: WITHIN THE PAST 12 MONTHS, YOU WORRIED THAT YOUR FOOD WOULD RUN OUT BEFORE YOU GOT MONEY TO BUY MORE.: NEVER TRUE

## 2023-05-12 SDOH — ECONOMIC STABILITY: FOOD INSECURITY: WITHIN THE PAST 12 MONTHS, THE FOOD YOU BOUGHT JUST DIDN'T LAST AND YOU DIDN'T HAVE MONEY TO GET MORE.: NEVER TRUE

## 2023-05-12 SDOH — ECONOMIC STABILITY: INCOME INSECURITY: HOW HARD IS IT FOR YOU TO PAY FOR THE VERY BASICS LIKE FOOD, HOUSING, MEDICAL CARE, AND HEATING?: NOT HARD AT ALL

## 2023-05-12 SDOH — ECONOMIC STABILITY: HOUSING INSECURITY
IN THE LAST 12 MONTHS, WAS THERE A TIME WHEN YOU DID NOT HAVE A STEADY PLACE TO SLEEP OR SLEPT IN A SHELTER (INCLUDING NOW)?: NO

## 2023-05-12 SDOH — ECONOMIC STABILITY: TRANSPORTATION INSECURITY
IN THE PAST 12 MONTHS, HAS LACK OF TRANSPORTATION KEPT YOU FROM MEETINGS, WORK, OR FROM GETTING THINGS NEEDED FOR DAILY LIVING?: NO

## 2023-05-15 ENCOUNTER — OFFICE VISIT (OUTPATIENT)
Facility: CLINIC | Age: 78
End: 2023-05-15
Payer: MEDICARE

## 2023-05-15 VITALS
DIASTOLIC BLOOD PRESSURE: 70 MMHG | TEMPERATURE: 97.1 F | WEIGHT: 169.8 LBS | BODY MASS INDEX: 26.65 KG/M2 | HEIGHT: 67 IN | SYSTOLIC BLOOD PRESSURE: 130 MMHG | HEART RATE: 76 BPM | OXYGEN SATURATION: 97 %

## 2023-05-15 DIAGNOSIS — G57.02 PIRIFORMIS SYNDROME OF LEFT SIDE: Primary | ICD-10-CM

## 2023-05-15 PROCEDURE — 3078F DIAST BP <80 MM HG: CPT | Performed by: FAMILY MEDICINE

## 2023-05-15 PROCEDURE — G8427 DOCREV CUR MEDS BY ELIG CLIN: HCPCS | Performed by: FAMILY MEDICINE

## 2023-05-15 PROCEDURE — G8419 CALC BMI OUT NRM PARAM NOF/U: HCPCS | Performed by: FAMILY MEDICINE

## 2023-05-15 PROCEDURE — 1036F TOBACCO NON-USER: CPT | Performed by: FAMILY MEDICINE

## 2023-05-15 PROCEDURE — 3075F SYST BP GE 130 - 139MM HG: CPT | Performed by: FAMILY MEDICINE

## 2023-05-15 PROCEDURE — 99214 OFFICE O/P EST MOD 30 MIN: CPT | Performed by: FAMILY MEDICINE

## 2023-05-15 PROCEDURE — 1123F ACP DISCUSS/DSCN MKR DOCD: CPT | Performed by: FAMILY MEDICINE

## 2023-05-15 RX ORDER — PRAVASTATIN SODIUM 20 MG
TABLET ORAL
COMMUNITY
Start: 2023-04-03

## 2023-05-15 RX ORDER — ASPIRIN 81 MG/1
81 TABLET ORAL DAILY
COMMUNITY

## 2023-05-15 RX ORDER — PIOGLITAZONEHYDROCHLORIDE 30 MG/1
TABLET ORAL
COMMUNITY
Start: 2023-01-03

## 2023-05-15 RX ORDER — BACLOFEN 10 MG/1
TABLET ORAL
Qty: 60 TABLET | Refills: 0 | Status: SHIPPED | OUTPATIENT
Start: 2023-05-15

## 2023-05-15 RX ORDER — GLIPIZIDE 5 MG/1
TABLET ORAL
COMMUNITY
Start: 2023-04-01

## 2023-05-15 RX ORDER — CETIRIZINE HYDROCHLORIDE 10 MG/1
10 TABLET ORAL DAILY
COMMUNITY

## 2023-05-15 RX ORDER — PREDNISONE 20 MG/1
40 TABLET ORAL DAILY
Qty: 10 TABLET | Refills: 0 | Status: SHIPPED | OUTPATIENT
Start: 2023-05-15 | End: 2023-05-20

## 2023-05-15 ASSESSMENT — PATIENT HEALTH QUESTIONNAIRE - PHQ9
2. FEELING DOWN, DEPRESSED OR HOPELESS: 0
SUM OF ALL RESPONSES TO PHQ QUESTIONS 1-9: 0
SUM OF ALL RESPONSES TO PHQ QUESTIONS 1-9: 0
SUM OF ALL RESPONSES TO PHQ9 QUESTIONS 1 & 2: 0
SUM OF ALL RESPONSES TO PHQ QUESTIONS 1-9: 0
SUM OF ALL RESPONSES TO PHQ QUESTIONS 1-9: 0
1. LITTLE INTEREST OR PLEASURE IN DOING THINGS: 0

## 2023-05-15 NOTE — PROGRESS NOTES
Chief Complaint   Patient presents with    Follow-up     Pain in left leg, buttocks and ankle x 10 days      /70 (Site: Right Upper Arm, Position: Sitting, Cuff Size: Medium Adult)   Pulse 76   Temp 97.1 °F (36.2 °C) (Temporal)   Ht 5' 7\" (1.702 m)   Wt 169 lb 12.8 oz (77 kg)   SpO2 97%   BMI 26.59 kg/m²     1. Have you been to the ER, urgent care clinic since your last visit? Hospitalized since your last visit? No    2. Have you seen or consulted any other health care providers outside of the [address] [place] [address] since your last visit? No     3. For patients aged 39-70: Has the patient had a colonoscopy / FIT/ Cologuard? NA - based on age/sex    If the patient is female:    4. For patients aged 41-77: Has the patient had a mammogram within the past 2 years? NA - based on age/sex      5. For patients aged 21-65: Has the patient had a pap smear?   NA - based on age/sex    PHQ-9 Total Score: 0 (5/15/2023  8:36 AM)

## 2023-05-15 NOTE — PROGRESS NOTES
Subjective  Chief Complaint   Patient presents with    Follow-up     Pain in left leg, buttocks and ankle x 10 days      HPI:  Charles Moreno is a 69 y.o. male. Symptoms started about 10 days ago with pain in left foot. When he took shoes off his left foot was swollen. He gets pain in left buttock  and lateral left ankle. It feels like muscle strain. First thing in AM the ankle hurts to stand. Sitting in the recliner, he has to shift due to the buttock pain. He started using ibuprofen last night but no relief. No fever, saddle numbness, or bowel incontinence. Objective  Vitals:    05/15/23 0837   BP: 130/70   Pulse: 76   Temp: 97.1 °F (36.2 °C)   SpO2: 97%     Physical Exam  Constitutional:       General: He is not in acute distress. Appearance: Normal appearance. He is normal weight. Pulmonary:      Effort: Pulmonary effort is normal.   Musculoskeletal:      Comments: Back:  Seated Leg Raise: normal bilaterally  Lumbar Rotation: normal bilaterally  Forward flexion at waist:  Fingertips to shins  Palpation: non tender   Neurological:      General: No focal deficit present. Mental Status: He is alert and oriented to person, place, and time. Mental status is at baseline. Psychiatric:         Mood and Affect: Mood normal.         Behavior: Behavior normal.        Assessment & Plan     Diagnosis Orders   1. Piriformis syndrome of left side  predniSONE (DELTASONE) 20 MG tablet    baclofen (LIORESAL) 10 MG tablet        1. Piriformis syndrome of left side  -     predniSONE (DELTASONE) 20 MG tablet; Take 2 tablets by mouth daily for 5 days, Disp-10 tablet, R-0Normal  -     baclofen (LIORESAL) 10 MG tablet; Take 1-2 tabs 4 times daily as needed for muscle pain, Disp-60 tablet, R-0Normal  We reviewed mechanisms of injury for piriformis syndrome such as keeping the wallet in the back pocket, etc.  I stressed the regular stretching not only for this flare but to prevent future flares.   We are going to

## 2023-05-19 RX ORDER — PRAVASTATIN SODIUM 20 MG
1 TABLET ORAL NIGHTLY
COMMUNITY
Start: 2023-04-03

## 2023-05-19 RX ORDER — GLIPIZIDE 5 MG/1
TABLET ORAL
COMMUNITY
Start: 2023-01-03

## 2023-05-26 ENCOUNTER — TELEPHONE (OUTPATIENT)
Facility: CLINIC | Age: 78
End: 2023-05-26

## 2023-05-26 DIAGNOSIS — M79.605 LEFT LEG PAIN: Primary | ICD-10-CM

## 2023-05-31 NOTE — TELEPHONE ENCOUNTER
Pt reported going to [facility]. And they have set him up with a spine specialist in June and will begin pt in July.

## 2023-06-08 DIAGNOSIS — G57.02 PIRIFORMIS SYNDROME OF LEFT SIDE: ICD-10-CM

## 2023-06-10 RX ORDER — BACLOFEN 10 MG/1
TABLET ORAL
Qty: 60 TABLET | Refills: 1 | Status: SHIPPED | OUTPATIENT
Start: 2023-06-10

## 2023-06-22 ENCOUNTER — HOSPITAL ENCOUNTER (OUTPATIENT)
Facility: HOSPITAL | Age: 78
Setting detail: RECURRING SERIES
Discharge: HOME | End: 2023-06-25
Payer: MEDICARE

## 2023-06-22 PROCEDURE — 97110 THERAPEUTIC EXERCISES: CPT | Performed by: PHYSICAL THERAPIST

## 2023-06-22 PROCEDURE — 97161 PT EVAL LOW COMPLEX 20 MIN: CPT | Performed by: PHYSICAL THERAPIST

## 2023-06-28 ENCOUNTER — HOSPITAL ENCOUNTER (OUTPATIENT)
Facility: HOSPITAL | Age: 78
Setting detail: RECURRING SERIES
Discharge: HOME | End: 2023-07-01
Payer: MEDICARE

## 2023-06-28 PROCEDURE — 97140 MANUAL THERAPY 1/> REGIONS: CPT | Performed by: PHYSICAL THERAPIST

## 2023-06-28 PROCEDURE — 97110 THERAPEUTIC EXERCISES: CPT | Performed by: PHYSICAL THERAPIST

## 2023-06-28 PROCEDURE — 97035 APP MDLTY 1+ULTRASOUND EA 15: CPT | Performed by: PHYSICAL THERAPIST

## 2023-07-06 ENCOUNTER — HOSPITAL ENCOUNTER (OUTPATIENT)
Facility: HOSPITAL | Age: 78
Setting detail: RECURRING SERIES
Discharge: HOME | End: 2023-07-09
Payer: MEDICARE

## 2023-07-06 PROCEDURE — 97035 APP MDLTY 1+ULTRASOUND EA 15: CPT | Performed by: PHYSICAL THERAPIST

## 2023-07-06 PROCEDURE — 97110 THERAPEUTIC EXERCISES: CPT | Performed by: PHYSICAL THERAPIST

## 2023-07-06 PROCEDURE — 97140 MANUAL THERAPY 1/> REGIONS: CPT | Performed by: PHYSICAL THERAPIST

## 2023-07-11 ENCOUNTER — HOSPITAL ENCOUNTER (OUTPATIENT)
Facility: HOSPITAL | Age: 78
Setting detail: RECURRING SERIES
Discharge: HOME | End: 2023-07-14
Payer: MEDICARE

## 2023-07-11 PROCEDURE — 97110 THERAPEUTIC EXERCISES: CPT | Performed by: PHYSICAL THERAPIST

## 2023-07-11 PROCEDURE — 97140 MANUAL THERAPY 1/> REGIONS: CPT | Performed by: PHYSICAL THERAPIST

## 2023-07-11 PROCEDURE — 97035 APP MDLTY 1+ULTRASOUND EA 15: CPT | Performed by: PHYSICAL THERAPIST

## 2023-07-11 NOTE — PROGRESS NOTES
[address] [facility] [address]  [address], [address], [address]  Phone: [phone]    Fax: [phone]    PHYSICAL THERAPY PROGRESS NOTE  Patient Name:  Luis Moreno :  1945   Treatment/Medical Diagnosis: Other low back pain [M54.59]  Radiculopathy, lumbosacral region [M54.17]   Referral Source:  Marton Spatz, [place]     Date of Initial Visit:  2023 Attended Visits:  4 Missed Visits:  0     SUMMARY OF TREATMENT/ASSESSMENT:   US, joint mobilization, Meckenzie extension program, core stabilization. Short Term Goals: To be accomplished in 4 weeks   Inpd with HEP,  Progressing 2023   Centralize symptoms, Progressing 2023   Able good body mechanics in a variety of function situations ( sitting, lifting), Progressing 2023  Long Term Goals: To be accomplished in 8 weeks   Symptoms consistently < 2/10,  MET 2023    CURRENT STATUS    Patient reports his symptoms are 95% improved. He continues to have intermittent left LE paresthesia,TTP over the left L5, and left plantar flexor weakness. RECOMMENDATIONS  Please advise and thank you for this referral.           Ju Humphrey, PT       2023       9:07 AM    If you have any questions/comments please contact us directly at [phone]. Thank you for allowing us to assist in the care of your patient.
PHYSICAL THERAPY - DAILY TREATMENT NOTE (updated 3/23)      Date: 2023          Patient Name:  Екатерина Moreno :  1945   Medical   Diagnosis:  Other low back pain [M54.59]  Radiculopathy, lumbosacral region [M54.17] Treatment Diagnosis:  M54.32  SCIATICA, LEFT SIDE    Referral Source:  MISSY Winkler Insurance:   Payor: MEDICARE / Plan: MEDICARE PART A AND B / Product Type: *No Product type* /                     Patient  verified yes     Visit #   Current  / Total 4 Medical necessity   Time   In / Out     Total Treatment Time 50  minutes   Total Timed Codes 41  minutes         SUBJECTIVE    Pain Level (0-10 scale): 0    Any medication changes, allergies to medications, adverse drug reactions, diagnosis change, or new procedure performed?: [x] No    [] Yes (see summary sheet for update)  Medications: Verified on Patient Summary List    Subjective functional status/changes:       Going back to the MD tomorrow. OBJECTIVE      Therapeutic Procedures: Tx Min Billable or 1:1 Min (if diff from Tx Min) Procedure, Rationale, Specifics   20  39266 Therapeutic Exercise (timed):  increase ROM, strength, coordination, balance, and proprioception to improve patient's ability to progress to PLOF and address remaining functional goals. (see flow sheet as applicable)     Details if applicable:   15  98172 Manual Therapy (timed):  decrease pain and increase tissue extensibility to improve patient's ability to progress to PLOF and address remaining functional goals. The manual therapy interventions were performed at a separate and distinct time from the therapeutic activities interventions . (see flow sheet as applicable)     Details if applicable:  MISSY mcghee I3Q1, STM paraspinals.            Details if applicable:           Details if applicable:            Details if applicable:     35 minutes     Total Total         Modalities Rationale:     increase tissue extensibility to improve patient's ability to
6/28/2023   Centralize symptoms, PROGRESSING 6/28/2023   Able good body mechanics in a variety of function situations ( sitting, lifting)  Long Term Goals: To be accomplished in 8 weeks   Symptoms consistently < 2/10, Progressing 7/11/2023.        PLAN  Yes  Continue plan of care  Re-Cert Due: 5/51/9234  [x]  Upgrade activities as tolerated  []  Discharge due to :  []  Other:      Glyn Paget Page, PT       7/11/2023       10:04 AM

## 2023-07-13 ENCOUNTER — HOSPITAL ENCOUNTER (OUTPATIENT)
Facility: HOSPITAL | Age: 78
Setting detail: RECURRING SERIES
Discharge: HOME | End: 2023-07-16
Payer: MEDICARE

## 2023-07-13 PROCEDURE — 97110 THERAPEUTIC EXERCISES: CPT | Performed by: PHYSICAL THERAPIST

## 2023-07-13 PROCEDURE — 97140 MANUAL THERAPY 1/> REGIONS: CPT | Performed by: PHYSICAL THERAPIST

## 2023-07-13 PROCEDURE — 97035 APP MDLTY 1+ULTRASOUND EA 15: CPT | Performed by: PHYSICAL THERAPIST

## 2023-07-18 ENCOUNTER — HOSPITAL ENCOUNTER (OUTPATIENT)
Facility: HOSPITAL | Age: 78
Setting detail: RECURRING SERIES
Discharge: HOME | End: 2023-07-21
Payer: MEDICARE

## 2023-07-18 PROCEDURE — 97035 APP MDLTY 1+ULTRASOUND EA 15: CPT | Performed by: PHYSICAL THERAPIST

## 2023-07-18 PROCEDURE — 97140 MANUAL THERAPY 1/> REGIONS: CPT | Performed by: PHYSICAL THERAPIST

## 2023-07-18 NOTE — PROGRESS NOTES
PHYSICAL THERAPY - DAILY TREATMENT NOTE (updated 3/23)      Date: 2023          Patient Name:  Thelma Moreno :  1945   Medical   Diagnosis:  Other low back pain [M54.59]  Radiculopathy, lumbosacral region [M54.17] Treatment Diagnosis:  M54.32  SCIATICA, LEFT SIDE    Referral Source:  MISSY Nava Insurance:   Payor: MEDICARE / Plan: MEDICARE PART A AND B / Product Type: *No Product type* /                     Patient  verified yes     Visit #   Current  / Total 6 Medical necessity   Time   In / Out     Total Treatment Time 30  minutes   Total Timed Codes 26  minutes         SUBJECTIVE    Pain Level (0-10 scale): 0    Any medication changes, allergies to medications, adverse drug reactions, diagnosis change, or new procedure performed?: [x] No    [] Yes (see summary sheet for update)  Medications: Verified on Patient Summary List    Subjective functional status/changes:       I just did my exercise so I would like to skip them this afternoon. This is the first day I have had no symptoms since onset . OBJECTIVE      Therapeutic Procedures: Tx Min Billable or 1:1 Min (if diff from Tx Min) Procedure, Rationale, Specifics     21037 Therapeutic Exercise (timed):  increase ROM, strength, coordination, balance, and proprioception to improve patient's ability to progress to PLOF and address remaining functional goals. (see flow sheet as applicable)     Details if applicable:   20  06276 Manual Therapy (timed):  decrease pain and increase tissue extensibility to improve patient's ability to progress to PLOF and address remaining functional goals. The manual therapy interventions were performed at a separate and distinct time from the therapeutic activities interventions . (see flow sheet as applicable)     Details if applicable:  MISSY mcghee L8C8, STM paraspinals.            Details if applicable:           Details if applicable:            Details if applicable:     20 minutes      Total Total

## 2023-07-21 ENCOUNTER — HOSPITAL ENCOUNTER (OUTPATIENT)
Facility: HOSPITAL | Age: 78
Setting detail: RECURRING SERIES
Discharge: HOME | End: 2023-07-24
Payer: MEDICARE

## 2023-07-21 PROCEDURE — 97035 APP MDLTY 1+ULTRASOUND EA 15: CPT | Performed by: PHYSICAL THERAPIST

## 2023-07-21 PROCEDURE — 97110 THERAPEUTIC EXERCISES: CPT | Performed by: PHYSICAL THERAPIST

## 2023-07-21 PROCEDURE — 97140 MANUAL THERAPY 1/> REGIONS: CPT | Performed by: PHYSICAL THERAPIST

## 2023-07-21 NOTE — PROGRESS NOTES
PHYSICAL THERAPY - DAILY TREATMENT NOTE (updated 3/23)      Date: 2023          Patient Name:  Thomas Chapa Form :  1945   Medical   Diagnosis:  Other low back pain [M54.59]  Radiculopathy, lumbosacral region [M54.17] Treatment Diagnosis:  M54.32  SCIATICA, LEFT SIDE    Referral Source:  MISSY Nava Insurance:   Payor: MEDICARE / Plan: MEDICARE PART A AND B / Product Type: *No Product type* /                     Patient  verified yes     Visit #   Current  / Total 7 Medical necessity   Time   In / Out     Total Treatment Time 45  minutes   Total Timed Codes 40  minutes         SUBJECTIVE    Pain Level (0-10 scale): 0    Any medication changes, allergies to medications, adverse drug reactions, diagnosis change, or new procedure performed?: [x] No    [] Yes (see summary sheet for update)  Medications: Verified on Patient Summary List    Subjective functional status/changes:       I continue to be pain free. Very pleased with results of PT.     OBJECTIVE      Therapeutic Procedures: Tx Min Billable or 1:1 Min (if diff from Tx Min) Procedure, Rationale, Specifics   15  26386 Therapeutic Exercise (timed):  increase ROM, strength, coordination, balance, and proprioception to improve patient's ability to progress to PLOF and address remaining functional goals. (see flow sheet as applicable)     Details if applicable:   20  26626 Manual Therapy (timed):  decrease pain and increase tissue extensibility to improve patient's ability to progress to PLOF and address remaining functional goals. The manual therapy interventions were performed at a separate and distinct time from the therapeutic activities interventions . (see flow sheet as applicable)     Details if applicable:  MISSY mcghee R4E1, STM paraspinals.            Details if applicable:           Details if applicable:            Details if applicable:     35 minutes      Total Total         Modalities Rationale:     increase tissue extensibility to

## 2023-07-25 ENCOUNTER — APPOINTMENT (OUTPATIENT)
Facility: HOSPITAL | Age: 78
End: 2023-07-25
Payer: MEDICARE

## 2023-07-26 ENCOUNTER — HOSPITAL ENCOUNTER (OUTPATIENT)
Facility: HOSPITAL | Age: 78
Setting detail: RECURRING SERIES
Discharge: HOME | End: 2023-07-29
Payer: MEDICARE

## 2023-07-26 PROCEDURE — 97110 THERAPEUTIC EXERCISES: CPT | Performed by: PHYSICAL THERAPIST

## 2023-07-26 PROCEDURE — 97140 MANUAL THERAPY 1/> REGIONS: CPT | Performed by: PHYSICAL THERAPIST

## 2023-07-26 PROCEDURE — 97035 APP MDLTY 1+ULTRASOUND EA 15: CPT | Performed by: PHYSICAL THERAPIST

## 2023-07-26 NOTE — PROGRESS NOTES
[address] [facility] [address]  [address], [address], [address]  Phone: [phone]    Fax: [phone]    PHYSICAL THERAPY PROGRESS NOTE  Patient Name:  Rachel Moreno :  1945   Treatment/Medical Diagnosis: Other low back pain [M54.59]  Radiculopathy, lumbosacral region [M54.17]   Referral Source:  [employer]     Date of Initial Visit:  2023 Attended Visits:  8 Missed Visits:  0     SUMMARY OF TREATMENT/ASSESSMENT:  ROM, US, chintan therapy, HEP, core program.     CURRENT STATUS    SUBJECTIVE:  No pain. \"Stiffness\". Patient reports he is back to baseline. OBJECTIVE:    Strength:  slight decrease in left ankle plantar flexors    Short Term Goals: To be accomplished in 4 weeks   Inpd with HEP, MET 2023   Centralize symptoms , MET 2023   Able good body mechanics in a variety of function situations ( sitting, lifting)  Long Term Goals: To be accomplished in 8 weeks   Symptoms consistently < 2/10, Progressing 2023. RECOMMENDATIONS  Most goals have been achieved. Probable transition to HEP within the next 2 weeks. April Waldemar Humphrey, PT       2023       10:25 AM    If you have any questions/comments please contact us directly at [phone]. Thank you for allowing us to assist in the care of your patient.
ability to progress to PLOF and address remaining functional goals. min [] Estim Unattended,             type/location:       []  w/ice    []  w/heat        min [] Estim Attended,             type/location:       []  w/ice   []  w/heat         []  w/US   []  TENS insruct            min []  Mechanical Traction,        type/lbs:        []  pro      []  sup           []  int       []  cont            []  before manual           []  after manual    6 min [x]  Ultrasound,         settings/location: 1.5 w/cm2, 100 %, left L4L5     min  unbilled []  Ice     []  Heat            location/position:         min []  Vasopneumatic Device,      press/temp:   pre-treatment girth :    post-treatment girth :    measured at (landmark       location) : If using vaso (only need to measure limb vaso being performed on)        min []  Other:        Skin assessment post-treatment (if applicable):    [x]  intact    []  redness- no adverse reaction                 []redness - adverse reaction:          [x]  Patient Education billed concurrently with other procedures   [x] Review HEP    [x] Progressed/Changed HEP, detail:      Other Objective/Functional Measures  Gait WNL. Single leg heel raise, left:  4/5. Pain Level at end of session (0-10 scale): 0      Assessment   Patient is symptom free day. Consider transitioning to HEP within the next 1-2 weeks. Patient will continue to benefit from skilled PT / OT services to modify and progress therapeutic interventions, analyze and address strength deficits, analyze and address soft tissue restrictions, and analyze and cue for proper movement patterns to address functional deficits and attain remaining goals. Progress toward goals / Updated goals:  [x]  See Progress Note/Recertification    Short Term Goals:  To be accomplished in 4 weeks   Inpd with HEP, MET 7/26/2023   Centralize symptoms , MET 7/26/2023   Able good body mechanics in a variety of function situations (

## 2023-07-27 ENCOUNTER — APPOINTMENT (OUTPATIENT)
Facility: HOSPITAL | Age: 78
End: 2023-07-27
Payer: MEDICARE

## 2023-07-28 ENCOUNTER — HOSPITAL ENCOUNTER (OUTPATIENT)
Facility: HOSPITAL | Age: 78
Setting detail: RECURRING SERIES
Discharge: HOME | End: 2023-07-31
Payer: MEDICARE

## 2023-07-28 PROCEDURE — 97140 MANUAL THERAPY 1/> REGIONS: CPT | Performed by: PHYSICAL THERAPIST

## 2023-07-28 PROCEDURE — 97035 APP MDLTY 1+ULTRASOUND EA 15: CPT | Performed by: PHYSICAL THERAPIST

## 2023-07-28 NOTE — PROGRESS NOTES
tissue extensibility to improve patient's ability to progress to PLOF and address remaining functional goals. min [] Estim Unattended,             type/location:       []  w/ice    []  w/heat        min [] Estim Attended,             type/location:       []  w/ice   []  w/heat         []  w/US   []  TENS insruct            min []  Mechanical Traction,        type/lbs:        []  pro      []  sup           []  int       []  cont            []  before manual           []  after manual    6 min [x]  Ultrasound,         settings/location: 1.5 w/cm2, 100 %, left L4L5     min  unbilled []  Ice     []  Heat            location/position:         min []  Vasopneumatic Device,      press/temp:   pre-treatment girth :    post-treatment girth :    measured at (landmark       location) : If using vaso (only need to measure limb vaso being performed on)        min []  Other:        Skin assessment post-treatment (if applicable):    [x]  intact    []  redness- no adverse reaction                 []redness - adverse reaction:          [x]  Patient Education billed concurrently with other procedures   [x] Review HEP    [x] Progressed/Changed HEP, detail:      Other Objective/Functional Measures  Gait WNL. Single leg heel raise, left:  4/5. Pain Level at end of session (0-10 scale): 0      Assessment   Patient is symptom free today. Last session will be next week. No unmet needs. Patient will continue to benefit from skilled PT / OT services to modify and progress therapeutic interventions, analyze and address strength deficits, analyze and address soft tissue restrictions, and analyze and cue for proper movement patterns to address functional deficits and attain remaining goals. Progress toward goals / Updated goals:  [x]  See Progress Note/Recertification    Short Term Goals:  To be accomplished in 4 weeks   Inpd with HEP, MET 7/26/2023   Centralize symptoms , MET 7/26/2023   Able good body mechanics in a

## 2023-08-02 ENCOUNTER — HOSPITAL ENCOUNTER (OUTPATIENT)
Facility: HOSPITAL | Age: 78
Setting detail: RECURRING SERIES
Discharge: HOME | End: 2023-08-05
Payer: MEDICARE

## 2023-08-02 PROCEDURE — 97110 THERAPEUTIC EXERCISES: CPT | Performed by: PHYSICAL THERAPIST

## 2023-08-02 PROCEDURE — 97140 MANUAL THERAPY 1/> REGIONS: CPT | Performed by: PHYSICAL THERAPIST

## 2023-08-02 PROCEDURE — 97035 APP MDLTY 1+ULTRASOUND EA 15: CPT | Performed by: PHYSICAL THERAPIST

## 2023-08-02 NOTE — PROGRESS NOTES
[address] [facility] [address]  [address], [address], [address]  Phone: [phone]    Fax: [phone]    DISCHARGE SUMMARY  Patient Name: Jose Ramon Moreno : 1945   Treatment/Medical Diagnosis: Other low back pain [M54.59]  Radiculopathy, lumbosacral region [M54.17]   Referral Source: Desi Pino     Date of Initial Visit: 2023 Attended Visits: 10 Missed Visits: 0     SUMMARY OF TREATMENT  US, joint mobilizations lower lumbar spine, core program, Ana Extension program     CURRENT STATUS      Subjective: 0/10      Objective:      Mild decrease left ankle plantar flexor strength  TL AROM:  WNL     Short Term Goals: To be accomplished in 4 weeks   Inpd with HEP, MET 2023   Centralize symptoms , MET 2023   Able good body mechanics in a variety of function situations ( sitting, lifting), <ET 2023  Long Term Goals: To be accomplished in 8 weeks   Symptoms consistently < 2/10, MET  2023. RECOMMENDATIONS  Discontinue therapy. Progressing towards or have reached established goals. Ju Humprhey, PT       2023       9:02 AM    If you have any questions/comments please contact us directly at [phone]. Thank you for allowing us to assist in the care of your patient.
tissue extensibility to improve patient's ability to progress to PLOF and address remaining functional goals. min [] Estim Unattended,             type/location:       []  w/ice    []  w/heat        min [] Estim Attended,             type/location:       []  w/ice   []  w/heat         []  w/US   []  TENS insruct            min []  Mechanical Traction,        type/lbs:        []  pro      []  sup           []  int       []  cont            []  before manual           []  after manual    6 min [x]  Ultrasound,         settings/location: 1.5 w/cm2, 100 %, left L4L5     min  unbilled []  Ice     []  Heat            location/position:         min []  Vasopneumatic Device,      press/temp:   pre-treatment girth :    post-treatment girth :    measured at (landmark       location) : If using vaso (only need to measure limb vaso being performed on)        min []  Other:        Skin assessment post-treatment (if applicable):    [x]  intact    []  redness- no adverse reaction                 []redness - adverse reaction:          [x]  Patient Education billed concurrently with other procedures   [x] Review HEP    [x] Progressed/Changed HEP, detail:      Other Objective/Functional Measures  Gait WNL. Single leg heel raise, left:  4/5. Pain Level at end of session (0-10 scale): 0      Assessment   Patient is symptom free today. Last session will be next week. No unmet needs. Patient will continue to benefit from skilled PT / OT services to modify and progress therapeutic interventions, analyze and address strength deficits, analyze and address soft tissue restrictions, and analyze and cue for proper movement patterns to address functional deficits and attain remaining goals. Progress toward goals / Updated goals:  [x]  See Progress Note/Recertification    Short Term Goals:  To be accomplished in 4 weeks   Inpd with HEP, MET 7/26/2023   Centralize symptoms , MET 7/26/2023   Able good body mechanics in a

## 2023-08-08 ENCOUNTER — APPOINTMENT (OUTPATIENT)
Facility: HOSPITAL | Age: 78
End: 2023-08-08
Payer: MEDICARE

## 2023-08-10 ENCOUNTER — APPOINTMENT (OUTPATIENT)
Facility: HOSPITAL | Age: 78
End: 2023-08-10
Payer: MEDICARE

## 2023-08-15 ENCOUNTER — NURSE ONLY (OUTPATIENT)
Age: 78
End: 2023-08-15

## 2023-08-15 DIAGNOSIS — E78.2 MIXED HYPERLIPIDEMIA: ICD-10-CM

## 2023-08-15 DIAGNOSIS — E11.65 TYPE 2 DIABETES MELLITUS WITH HYPERGLYCEMIA, WITHOUT LONG-TERM CURRENT USE OF INSULIN (HCC): ICD-10-CM

## 2023-08-15 LAB
ALBUMIN SERPL-MCNC: 3.9 G/DL (ref 3.5–5)
ALBUMIN/GLOB SERPL: 1.3 (ref 1.1–2.2)
ALP SERPL-CCNC: 67 U/L (ref 45–117)
ALT SERPL-CCNC: 23 U/L (ref 12–78)
ANION GAP SERPL CALC-SCNC: 8 MMOL/L (ref 5–15)
AST SERPL-CCNC: 18 U/L (ref 15–37)
BILIRUB SERPL-MCNC: 0.5 MG/DL (ref 0.2–1)
BUN SERPL-MCNC: 22 MG/DL (ref 6–20)
BUN/CREAT SERPL: 23 (ref 12–20)
CALCIUM SERPL-MCNC: 9 MG/DL (ref 8.5–10.1)
CHLORIDE SERPL-SCNC: 106 MMOL/L (ref 97–108)
CHOLEST SERPL-MCNC: 123 MG/DL
CO2 SERPL-SCNC: 26 MMOL/L (ref 21–32)
CREAT SERPL-MCNC: 0.96 MG/DL (ref 0.7–1.3)
GLOBULIN SER CALC-MCNC: 3.1 G/DL (ref 2–4)
GLUCOSE SERPL-MCNC: 191 MG/DL (ref 65–100)
HDLC SERPL-MCNC: 48 MG/DL
HDLC SERPL: 2.6 (ref 0–5)
LDLC SERPL CALC-MCNC: 57.2 MG/DL (ref 0–100)
POTASSIUM SERPL-SCNC: 4.6 MMOL/L (ref 3.5–5.1)
PROT SERPL-MCNC: 7 G/DL (ref 6.4–8.2)
SODIUM SERPL-SCNC: 140 MMOL/L (ref 136–145)
TRIGL SERPL-MCNC: 89 MG/DL
VLDLC SERPL CALC-MCNC: 17.8 MG/DL

## 2023-08-16 ENCOUNTER — APPOINTMENT (OUTPATIENT)
Facility: HOSPITAL | Age: 78
End: 2023-08-16
Payer: MEDICARE

## 2023-08-16 LAB
EST. AVERAGE GLUCOSE BLD GHB EST-MCNC: 183 MG/DL
HBA1C MFR BLD: 8 % (ref 4–5.6)

## 2023-08-18 ENCOUNTER — APPOINTMENT (OUTPATIENT)
Facility: HOSPITAL | Age: 78
End: 2023-08-18
Payer: MEDICARE

## 2023-08-22 ENCOUNTER — OFFICE VISIT (OUTPATIENT)
Age: 78
End: 2023-08-22
Payer: MEDICARE

## 2023-08-22 VITALS
SYSTOLIC BLOOD PRESSURE: 142 MMHG | BODY MASS INDEX: 26.06 KG/M2 | HEART RATE: 81 BPM | WEIGHT: 166 LBS | RESPIRATION RATE: 18 BRPM | TEMPERATURE: 97.1 F | HEIGHT: 67 IN | OXYGEN SATURATION: 97 % | DIASTOLIC BLOOD PRESSURE: 63 MMHG

## 2023-08-22 DIAGNOSIS — E11.65 TYPE 2 DIABETES MELLITUS WITH HYPERGLYCEMIA, WITHOUT LONG-TERM CURRENT USE OF INSULIN (HCC): Primary | ICD-10-CM

## 2023-08-22 DIAGNOSIS — E78.2 MIXED HYPERLIPIDEMIA: ICD-10-CM

## 2023-08-22 DIAGNOSIS — I10 ESSENTIAL (PRIMARY) HYPERTENSION: ICD-10-CM

## 2023-08-22 PROCEDURE — 1036F TOBACCO NON-USER: CPT | Performed by: INTERNAL MEDICINE

## 2023-08-22 PROCEDURE — 3077F SYST BP >= 140 MM HG: CPT | Performed by: INTERNAL MEDICINE

## 2023-08-22 PROCEDURE — G8427 DOCREV CUR MEDS BY ELIG CLIN: HCPCS | Performed by: INTERNAL MEDICINE

## 2023-08-22 PROCEDURE — 1123F ACP DISCUSS/DSCN MKR DOCD: CPT | Performed by: INTERNAL MEDICINE

## 2023-08-22 PROCEDURE — G8419 CALC BMI OUT NRM PARAM NOF/U: HCPCS | Performed by: INTERNAL MEDICINE

## 2023-08-22 PROCEDURE — 3052F HG A1C>EQUAL 8.0%<EQUAL 9.0%: CPT | Performed by: INTERNAL MEDICINE

## 2023-08-22 PROCEDURE — 99214 OFFICE O/P EST MOD 30 MIN: CPT | Performed by: INTERNAL MEDICINE

## 2023-08-22 PROCEDURE — 3078F DIAST BP <80 MM HG: CPT | Performed by: INTERNAL MEDICINE

## 2023-08-22 RX ORDER — GLIPIZIDE 5 MG/1
TABLET ORAL
Qty: 3600 TABLET | Refills: 3
Start: 2023-08-22 | End: 2023-08-22 | Stop reason: SDUPTHER

## 2023-08-22 RX ORDER — GLIPIZIDE 5 MG/1
TABLET ORAL
Qty: 360 TABLET | Refills: 3
Start: 2023-08-22

## 2023-08-22 RX ORDER — PRAVASTATIN SODIUM 20 MG
TABLET ORAL
Qty: 90 TABLET | Refills: 3 | Status: SHIPPED | OUTPATIENT
Start: 2023-08-22

## 2023-08-22 NOTE — PATIENT INSTRUCTIONS
SPECIFIC INSTRUCTIONS BELOW     Stay  on pravachol 20 mg at night         Stay  on  metformin  500 mg to 2 pills  twice a day with meals      continue  On januvia 100 mg a day  ( pt assistance )       Glipizide 5 mg  2 pills   twice  A day , twenty minutes before b-fast and dinner         Do not take it if you are not eating   Cut the pill to half if eating lesser than normal   Do not avoid lunches           pioglitazone  30  mg a day               -------------PAY ATTENTION TO THESE GENERAL INSTRUCTIONS -----------------      - The medications prescribed at this visit will not be available at pharmacy until 6 pm       - YOUR MED LIST IS NOT UP TO DATE AS SOME CHANGES ARE BEING MADE AFTER THE VISIT - FOLLOW SPECIFIC INSTRUCTIONS  ABOVE     -ANY tests other than blood work, which you opt to do  outside the  [facility] imaging facilities, you are responsible for prior authorizations if  required    - [address] UP TO DATE ON YOUR AVS- PLEASE IGNORE     Results     *Normal results will not be notified by a phone call starting January 1 2021   *If you have an upcoming visit, the results will be discussed at the visit   *Please sign up for MY CHART if you want access to your lab and test results  *Abnormal results which require immediate attention will be notified by phone call   *Abnormal results which do not require immediate assistance will be notified in 1-2 weeks       Refills    -    have your pharmacy send us a refill request . Refills are done max for one year and a visit is a must before refills are extended    Follow up appointments -  highly encourage you to make it when you are checking out. We can accommodate you into the schedule based on your clinical situation, but not for extending refills beyond a year. Labs are important to give refills and is important to get labs before the visit     Phone calls  -  Allow  24 hrs.  for non-urgent calls to be returned  Prior authorization -

## 2023-08-22 NOTE — PROGRESS NOTES
Kendrick Moreno is a 69 y.o. male here for   Chief Complaint   Patient presents with    Diabetes       1. Have you been to the ER or an urgent care clinic since your last visit? no    2. Have you been hospitalized since your last visit? - no     3. Have you seen or consulted any other health care providers outside of the [address] [place] [address] since your last visit?   Include any pap smears or colon screening.-[facility] - sciatica treatment and pt has received therapy, [facility] - pt received steroid medication

## 2023-08-22 NOTE — PROGRESS NOTES
India Brennan MD FACE             HISTORY OF PRESENT ILLNESS   Екатерина Moreno is a 69 y.o.  male. HPI   Patient here for f/u   After  visit of  Type 2 diabetes mellitus from  feb 2023     Lost 6 lbs   He had severe  pain issues, spine    ? Pyriformis syndrome     He finally saw PT  and had stretches  and that relieved  his pain           Feb 2023       No meter    He did not walk he says since July 2022, after wife had knee surgery    Gained 9 lbs ( winter wear )          August 2022        Lost 8 to 10 lbs    Doing TLc   Requesting fill of Renbib[facility] Basiliounda sai        Feb 2022     Gained  4 lbs    No meter, dislikes checks       August 2021       Pt has been doing well    He does not like checking sugars-    Compliant with meds    Requesting form fill up for [facility] 2 lbs             Referred : by pcp      H/o diabetes for 10  years    Current A1C is 8.7 %   From   March 8 2017  and symptoms/problems include fluctuant sugars  and polyuria    Current diabetic medications include glipizide xr 2.5 mg a day and metformin. Cardiovascular risk factors: dyslipidemia, diabetes mellitus, obesity, male gender, hypertension, stress         Review of Systems    Constitutional: Negative. Neurological: Negative. Physical Exam    Constitutional: He is oriented to person, place, and time. He appears well-developed and well-nourished.     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

## 2023-09-28 DIAGNOSIS — E11.65 TYPE 2 DIABETES MELLITUS WITH HYPERGLYCEMIA, WITHOUT LONG-TERM CURRENT USE OF INSULIN (HCC): Primary | ICD-10-CM

## 2023-09-28 RX ORDER — PIOGLITAZONEHYDROCHLORIDE 30 MG/1
30 TABLET ORAL DAILY
Qty: 90 TABLET | Refills: 3 | Status: SHIPPED | OUTPATIENT
Start: 2023-09-28

## 2023-09-28 RX ORDER — GLIPIZIDE 5 MG/1
TABLET ORAL
Qty: 360 TABLET | Refills: 3 | Status: SHIPPED | OUTPATIENT
Start: 2023-09-28

## 2023-10-23 ENCOUNTER — HOSPITAL ENCOUNTER (OUTPATIENT)
Facility: HOSPITAL | Age: 78
Discharge: HOME | End: 2023-10-26

## 2023-10-23 VITALS
BODY MASS INDEX: 26.06 KG/M2 | SYSTOLIC BLOOD PRESSURE: 187 MMHG | HEART RATE: 67 BPM | WEIGHT: 166 LBS | HEIGHT: 67 IN | DIASTOLIC BLOOD PRESSURE: 80 MMHG

## 2023-10-23 DIAGNOSIS — C61 PROSTATE CANCER (HCC): Primary | ICD-10-CM

## 2023-11-30 ENCOUNTER — HOSPITAL ENCOUNTER (OUTPATIENT)
Facility: HOSPITAL | Age: 78
Setting detail: OUTPATIENT SURGERY
Discharge: HOME | End: 2023-11-30
Attending: INTERNAL MEDICINE | Admitting: INTERNAL MEDICINE
Payer: MEDICARE

## 2023-11-30 ENCOUNTER — ANESTHESIA EVENT (OUTPATIENT)
Facility: HOSPITAL | Age: 78
End: 2023-11-30
Payer: MEDICARE

## 2023-11-30 ENCOUNTER — ANESTHESIA (OUTPATIENT)
Facility: HOSPITAL | Age: 78
End: 2023-11-30
Payer: MEDICARE

## 2023-11-30 VITALS
WEIGHT: 166 LBS | HEIGHT: 67 IN | OXYGEN SATURATION: 97 % | RESPIRATION RATE: 13 BRPM | DIASTOLIC BLOOD PRESSURE: 63 MMHG | HEART RATE: 71 BPM | TEMPERATURE: 97.1 F | SYSTOLIC BLOOD PRESSURE: 115 MMHG | BODY MASS INDEX: 26.06 KG/M2

## 2023-11-30 PROCEDURE — 2500000003 HC RX 250 WO HCPCS: Performed by: NURSE ANESTHETIST, CERTIFIED REGISTERED

## 2023-11-30 PROCEDURE — 6360000002 HC RX W HCPCS: Performed by: NURSE ANESTHETIST, CERTIFIED REGISTERED

## 2023-11-30 PROCEDURE — 7100000011 HC PHASE II RECOVERY - ADDTL 15 MIN: Performed by: INTERNAL MEDICINE

## 2023-11-30 PROCEDURE — 3600007512: Performed by: INTERNAL MEDICINE

## 2023-11-30 PROCEDURE — 3700000000 HC ANESTHESIA ATTENDED CARE: Performed by: INTERNAL MEDICINE

## 2023-11-30 PROCEDURE — 2580000003 HC RX 258: Performed by: INTERNAL MEDICINE

## 2023-11-30 PROCEDURE — 7100000010 HC PHASE II RECOVERY - FIRST 15 MIN: Performed by: INTERNAL MEDICINE

## 2023-11-30 PROCEDURE — 3700000001 HC ADD 15 MINUTES (ANESTHESIA): Performed by: INTERNAL MEDICINE

## 2023-11-30 PROCEDURE — 3600007502: Performed by: INTERNAL MEDICINE

## 2023-11-30 RX ORDER — SODIUM CHLORIDE 9 MG/ML
25 INJECTION, SOLUTION INTRAVENOUS PRN
Status: DISCONTINUED | OUTPATIENT
Start: 2023-11-30 | End: 2023-11-30 | Stop reason: HOSPADM

## 2023-11-30 RX ORDER — LIDOCAINE HYDROCHLORIDE 20 MG/ML
INJECTION, SOLUTION EPIDURAL; INFILTRATION; INTRACAUDAL; PERINEURAL PRN
Status: DISCONTINUED | OUTPATIENT
Start: 2023-11-30 | End: 2023-11-30 | Stop reason: SDUPTHER

## 2023-11-30 RX ORDER — ASPIRIN 81 MG/1
81 TABLET, CHEWABLE ORAL DAILY
COMMUNITY

## 2023-11-30 RX ORDER — SODIUM CHLORIDE 0.9 % (FLUSH) 0.9 %
5-40 SYRINGE (ML) INJECTION EVERY 12 HOURS SCHEDULED
Status: DISCONTINUED | OUTPATIENT
Start: 2023-11-30 | End: 2023-11-30 | Stop reason: HOSPADM

## 2023-11-30 RX ORDER — SODIUM CHLORIDE 9 MG/ML
INJECTION, SOLUTION INTRAVENOUS CONTINUOUS
Status: DISCONTINUED | OUTPATIENT
Start: 2023-11-30 | End: 2023-11-30 | Stop reason: HOSPADM

## 2023-11-30 RX ORDER — SODIUM CHLORIDE 0.9 % (FLUSH) 0.9 %
5-40 SYRINGE (ML) INJECTION PRN
Status: DISCONTINUED | OUTPATIENT
Start: 2023-11-30 | End: 2023-11-30 | Stop reason: HOSPADM

## 2023-11-30 RX ADMIN — PROPOFOL 30 MG: 10 INJECTION, EMULSION INTRAVENOUS at 14:19

## 2023-11-30 RX ADMIN — PROPOFOL 30 MG: 10 INJECTION, EMULSION INTRAVENOUS at 14:21

## 2023-11-30 RX ADMIN — PROPOFOL 30 MG: 10 INJECTION, EMULSION INTRAVENOUS at 14:13

## 2023-11-30 RX ADMIN — PROPOFOL 30 MG: 10 INJECTION, EMULSION INTRAVENOUS at 14:23

## 2023-11-30 RX ADMIN — SODIUM CHLORIDE: 9 INJECTION, SOLUTION INTRAVENOUS at 14:11

## 2023-11-30 RX ADMIN — PROPOFOL 30 MG: 10 INJECTION, EMULSION INTRAVENOUS at 14:10

## 2023-11-30 RX ADMIN — PROPOFOL 50 MG: 10 INJECTION, EMULSION INTRAVENOUS at 14:07

## 2023-11-30 RX ADMIN — PROPOFOL 30 MG: 10 INJECTION, EMULSION INTRAVENOUS at 14:16

## 2023-11-30 RX ADMIN — SODIUM CHLORIDE: 9 INJECTION, SOLUTION INTRAVENOUS at 13:26

## 2023-11-30 RX ADMIN — PROPOFOL 30 MG: 10 INJECTION, EMULSION INTRAVENOUS at 14:09

## 2023-11-30 RX ADMIN — PROPOFOL 30 MG: 10 INJECTION, EMULSION INTRAVENOUS at 14:15

## 2023-11-30 RX ADMIN — PROPOFOL 100 MG: 10 INJECTION, EMULSION INTRAVENOUS at 14:06

## 2023-11-30 RX ADMIN — PROPOFOL 30 MG: 10 INJECTION, EMULSION INTRAVENOUS at 14:17

## 2023-11-30 RX ADMIN — LIDOCAINE HYDROCHLORIDE 50 MG: 20 INJECTION, SOLUTION EPIDURAL; INFILTRATION; INTRACAUDAL; PERINEURAL at 14:06

## 2023-11-30 RX ADMIN — PROPOFOL 30 MG: 10 INJECTION, EMULSION INTRAVENOUS at 14:11

## 2023-11-30 NOTE — ANESTHESIA PRE PROCEDURE
GFRAA >60 08/02/2022 09:15 AM    AGRATIO 1.2 02/01/2023 08:58 AM    LABGLOM >60 08/15/2023 08:22 AM    GLUCOSE 191 08/15/2023 08:22 AM    PROT 7.0 08/15/2023 08:22 AM    CALCIUM 9.0 08/15/2023 08:22 AM    BILITOT 0.5 08/15/2023 08:22 AM    ALKPHOS 67 08/15/2023 08:22 AM    ALKPHOS 64 02/01/2023 08:58 AM    AST 18 08/15/2023 08:22 AM    ALT 23 08/15/2023 08:22 AM       POC Tests: No results for input(s): \"POCGLU\", \"POCNA\", \"POCK\", \"POCCL\", \"POCBUN\", \"POCHEMO\", \"POCHCT\" in the last 72 hours. Coags: No results found for: \"PROTIME\", \"INR\", \"APTT\"    HCG (If Applicable): No results found for: \"PREGTESTUR\", \"PREGSERUM\", \"HCG\", \"HCGQUANT\"     ABGs: No results found for: \"PHART\", \"PO2ART\", \"OVY0RMM\", \"OVE1XBB\", \"BEART\", \"J5WAHIHJ\"     Type & Screen (If Applicable):  No results found for: \"LABABO\", \"LABRH\"    Drug/Infectious Status (If Applicable):  No results found for: \"HIV\", \"HEPCAB\"    COVID-19 Screening (If Applicable): No results found for: \"COVID19\"        Anesthesia Evaluation  Patient summary reviewed and Nursing notes reviewed  Airway: Mallampati: II  TM distance: >3 FB   Neck ROM: full  Mouth opening: > = 3 FB   Dental: normal exam         Pulmonary:Negative Pulmonary ROS and normal exam    (+) sleep apnea:                             Cardiovascular:Negative CV ROS  Exercise tolerance: good (>4 METS),   (+) hypertension:,                   Neuro/Psych:   Negative Neuro/Psych ROS              GI/Hepatic/Renal: Neg GI/Hepatic/Renal ROS            Endo/Other: Negative Endo/Other ROS   (+) DiabetesType II DM, , .                 Abdominal: normal exam            Vascular: negative vascular ROS. Other Findings:           Anesthesia Plan      MAC     ASA 2       Induction: intravenous. Anesthetic plan and risks discussed with patient. Plan discussed with CRNA.     Attending anesthesiologist reviewed and agrees with Preprocedure content                Luan Ramos MD   11/30/2023

## 2023-11-30 NOTE — H&P
69 y.o. male presents for open access colonoscopy for screening. Additional H&P data will be attached on the day of procedure.     Asha Kiran MD

## 2023-11-30 NOTE — DISCHARGE INSTRUCTIONS
[facility]  [facility] - [address] or [address]  PHIL Guevara MD  [phone]      November 30, 2023    William Overcast Form  YOB: 1945    COLONOSCOPY DISCHARGE INSTRUCTIONS    If there is redness at IV site you should apply warm compress to area. If redness or soreness persist contact Dr. Kennedi Purcell office or your primary care doctor. There may be a slight amount of blood passed from the rectum. Gaseous discomfort may develop, but walking, belching will help relieve this. You may not operate a vehicle for 12 hours  You may not operate machinery or dangerous appliances for rest of today  You may not drink alcoholic beverages for 12 hours  Avoid making any critical decisions for 24 hours    DIET:  You may resume your normal diet, but some patients find that heavy or large meals may lead to indigestion or vomiting. I suggest a light meal as first food intake. MEDICATIONS:  The use of some over-the-counter pain medication may lead to bleeding after colon biopsies or polyp removal.  Tylenol (also called acetaminophen) is safe to take even if you have had colonoscopy with polyp removal.  Based on the procedure you had today you may safely take aspirin or aspirin-like products for the next seven (7) days. Remember that Tylenol (also called acetaminophen) is safe to take after colonoscopy even if you have had biopsies or polyps removed. ACTIVITY:  You may resume your normal household activities, but it is recommended that you spend the remainder of the day resting -  avoid any strenuous activity. CALL DR. Kennedi Purcell OFFICE IF:  Increasing pain, nausea, vomiting  Abdominal distension (swelling)  Significant new or increased bleeding (oral or rectal)  Fever/Chills  Chest pain/shortness of breath                       Additional instructions:   Impression:  Normal colon. No polyps. Grade 3 internal hemorrhoids.     Recommendations:   - no

## 2023-11-30 NOTE — INTERVAL H&P NOTE
Pre-Endoscopy H&P Update  Chief complaint/HPI/ROS:  The indication for the procedure, the patient's history and the patient's current medications are reviewed prior to the procedure and that data is reported on the H&P to which this document is attached. Any significant complaints with regard to organ systems will be noted, and if not mentioned then a review of systems is not contributory. Past Medical History:   Diagnosis Date    Allergies     Arthritis     DM (diabetes mellitus) ([address])     Hearing loss 1/1/13    Actual date unknown. Have hearing aids. Hyperlipidemia     Prostate cancer [facility])     Sciatica       Past Surgical History:   Procedure Laterality Date    COLONOSCOPY  346128    screening    CYST REMOVAL  1971    HEMORRHOID SURGERY      PROSTATE SURGERY  11/15/2019    biopsy    TONSILLECTOMY  1950     Social   Social History     Tobacco Use    Smoking status: Never    Smokeless tobacco: Never   Substance Use Topics    Alcohol use: Yes     Comment: rarely      Family History   Problem Relation Age of Onset    Diabetes Mother     Alzheimer's Disease Father     Cancer Father         Lung cancer    Prostate Cancer Father     Diabetes Brother         Prostate cancer    Diabetes Maternal Grandmother     Diabetes Maternal Grandfather       No Known Allergies   Prior to Admission Medications   Prescriptions Last Dose Informant Patient Reported? Taking?    SITagliptin (JANUVIA) 100 MG tablet   No No   Sig: Take 1 tablet by mouth daily   cetirizine (ZYRTEC) 10 MG tablet   Yes No   Sig: Take 1 tablet by mouth daily   glipiZIDE (GLUCOTROL) 5 MG tablet   No No   Sig: TAKE TWO TABLETS BY MOUTH TWICE A DAY 20 MINUTES BEFORE BREAKFAST AND BEFORE DINNER   metFORMIN (GLUCOPHAGE) 500 MG tablet   No No   Sig: TAKE TWO TABLETS BY MOUTH TWICE A DAY WITH A MEAL   pioglitazone (ACTOS) 30 MG tablet   No No   Sig: TAKE ONE TABLET BY MOUTH ONE TIME DAILY   pravastatin (PRAVACHOL) 20 MG tablet   No No   Sig: One pill at night

## 2023-11-30 NOTE — PROGRESS NOTES

## 2023-11-30 NOTE — OP NOTE
[facility]  [facility] - [address] and [address]  . Chang Lowe MD  [phone]      2023    Colonoscopy Procedure Note  Fadumo Large Form  :    [facility] Medical Record Number: [id number]    Indications:   Average risk colon cancer screening  PCP:  Jorge Johnson MD  Anesthesia/Sedation: See Anesthesia Record  Endoscopist:  Dr. Chiara Mendez  Complications:  None  Estimated Blood Loss:  None    Surgical assistant: Circulator: Giselle Judge RN  Endoscopy Technician: Uli King none unless otherwise specified. Permit:  The indications, risks, benefits and alternatives were reviewed with the patient or their decision maker who was provided an opportunity to ask questions and all questions were answered. The specific risks of colonoscopy with conscious sedation were reviewed, including but not limited to anesthetic complication, bleeding, adverse drug reaction, missed lesion, infection, IV site reactions, and intestinal perforation which would lead to the need for surgical repair. Alternatives to colonoscopy including radiographic imaging, observation without testing, or laboratory testing were reviewed including the limitations of those alternatives. After considering the options and having all their questions answered, the patient or their decision maker provided both verbal and written consent to proceed. Procedure in Detail:  After obtaining informed consent, positioning of the patient in the left lateral decubitus position, and conduction of a pre-procedure pause or \"time out\" the endoscope was introduced into the anus and advanced to the cecum, which was identified by the ileocecal valve and appendiceal orifice. The quality of the colonic preparation was good.   A careful inspection was made as the colonoscope was withdrawn, findings and interventions are described

## 2023-12-11 RX ORDER — SITAGLIPTIN 100 MG/1
100 TABLET, FILM COATED ORAL DAILY
Qty: 90 TABLET | Refills: 2 | Status: SHIPPED | OUTPATIENT
Start: 2023-12-11 | End: 2023-12-11 | Stop reason: SDUPTHER

## 2023-12-11 RX ORDER — SITAGLIPTIN 100 MG/1
100 TABLET, FILM COATED ORAL DAILY
Qty: 90 TABLET | Refills: 3 | Status: SHIPPED | OUTPATIENT
Start: 2023-12-11

## 2023-12-11 NOTE — TELEPHONE ENCOUNTER
Patient called back to ask if we can fax the hard script to [facility] at [phone].  Thank you

## 2023-12-12 ENCOUNTER — CLINICAL DOCUMENTATION (OUTPATIENT)
Age: 78
End: 2023-12-12

## 2023-12-12 NOTE — TELEPHONE ENCOUNTER
Manually faxed the printed rx to [facility] and received confirmation put note to expedite shipment asap

## 2024-01-16 ENCOUNTER — HOSPITAL ENCOUNTER (OUTPATIENT)
Age: 79
Discharge: HOME | End: 2024-01-19
Payer: MEDICARE

## 2024-01-16 ENCOUNTER — HOSPITAL ENCOUNTER (OUTPATIENT)
Facility: HOSPITAL | Age: 79
Discharge: HOME | End: 2024-01-19
Attending: RADIOLOGY

## 2024-01-16 DIAGNOSIS — C61 PROSTATE CA (HCC): ICD-10-CM

## 2024-01-16 PROCEDURE — 76498 UNLISTED MR PROCEDURE: CPT

## 2024-02-02 LAB
ALBUMIN SERPL-MCNC: 4.5 G/DL (ref 3.8–4.8)
ALBUMIN/GLOB SERPL: 1.5 {RATIO} (ref 1.2–2.2)
ALP SERPL-CCNC: 81 IU/L (ref 44–121)
ALT SERPL-CCNC: 16 IU/L (ref 0–44)
AST SERPL-CCNC: 13 IU/L (ref 0–40)
BILIRUB SERPL-MCNC: 0.4 MG/DL (ref 0–1.2)
BUN SERPL-MCNC: 17 MG/DL (ref 8–27)
BUN/CREAT SERPL: 19 (ref 10–24)
CALCIUM SERPL-MCNC: 9.9 MG/DL (ref 8.6–10.2)
CHLORIDE SERPL-SCNC: 98 MMOL/L (ref 96–106)
CHOLEST SERPL-MCNC: 129 MG/DL (ref 100–199)
CO2 SERPL-SCNC: 25 MMOL/L (ref 20–29)
CREAT SERPL-MCNC: 0.91 MG/DL (ref 0.76–1.27)
EGFRCR SERPLBLD CKD-EPI 2021: 87 ML/MIN/1.73
GLOBULIN SER CALC-MCNC: 3.1 G/DL (ref 1.5–4.5)
GLUCOSE SERPL-MCNC: 244 MG/DL (ref 70–99)
HBA1C MFR BLD: 8.5 % (ref 4.8–5.6)
HDLC SERPL-MCNC: 43 MG/DL
LDLC SERPL CALC-MCNC: 70 MG/DL (ref 0–99)
POTASSIUM SERPL-SCNC: 4.8 MMOL/L (ref 3.5–5.2)
PROT SERPL-MCNC: 7.6 G/DL (ref 6–8.5)
SODIUM SERPL-SCNC: 141 MMOL/L (ref 134–144)
TRIGL SERPL-MCNC: 80 MG/DL (ref 0–149)
VLDLC SERPL CALC-MCNC: 16 MG/DL (ref 5–40)

## 2024-02-06 ENCOUNTER — HOSPITAL ENCOUNTER (OUTPATIENT)
Facility: HOSPITAL | Age: 79
Discharge: HOME | End: 2024-02-09
Attending: RADIOLOGY

## 2024-02-06 LAB
ALBUMIN/CREAT UR: 96 MG/G CREAT (ref 0–29)
CREAT UR-MCNC: 211 MG/DL
IMP & REVIEW OF LAB RESULTS: NORMAL
Lab: NORMAL
MICROALBUMIN UR-MCNC: 202.9 UG/ML
RAD ONC ARIA COURSE FIRST TREATMENT DATE: NORMAL
RAD ONC ARIA COURSE ID: NORMAL
RAD ONC ARIA COURSE INTENT: NORMAL
RAD ONC ARIA COURSE LAST TREATMENT DATE: NORMAL
RAD ONC ARIA COURSE SESSION NUMBER: 1
RAD ONC ARIA COURSE TREATMENT ELAPSED DAYS: 0
RAD ONC ARIA PLAN FRACTIONS TREATED TO DATE: 1
RAD ONC ARIA PLAN ID: NORMAL
RAD ONC ARIA PLAN PRESCRIBED DOSE PER FRACTION: 7.25 GY
RAD ONC ARIA PLAN PRIMARY REFERENCE POINT: NORMAL
RAD ONC ARIA PLAN TOTAL FRACTIONS PRESCRIBED: 5
RAD ONC ARIA PLAN TOTAL PRESCRIBED DOSE: 3625 CGY
RAD ONC ARIA REFERENCE POINT DOSAGE GIVEN TO DATE: 7.25 GY
RAD ONC ARIA REFERENCE POINT DOSAGE GIVEN TO DATE: 8 GY
RAD ONC ARIA REFERENCE POINT DOSAGE GIVEN TO DATE: 8.16 GY
RAD ONC ARIA REFERENCE POINT DOSAGE GIVEN TO DATE: 9 GY
RAD ONC ARIA REFERENCE POINT ID: NORMAL
RAD ONC ARIA REFERENCE POINT SESSION DOSAGE GIVEN: 7.25 GY
RAD ONC ARIA REFERENCE POINT SESSION DOSAGE GIVEN: 8 GY
RAD ONC ARIA REFERENCE POINT SESSION DOSAGE GIVEN: 8.16 GY
RAD ONC ARIA REFERENCE POINT SESSION DOSAGE GIVEN: 9 GY

## 2024-02-08 ENCOUNTER — HOSPITAL ENCOUNTER (OUTPATIENT)
Facility: HOSPITAL | Age: 79
Discharge: HOME | End: 2024-02-11
Attending: RADIOLOGY

## 2024-02-08 DIAGNOSIS — C61 PROSTATE CANCER (HCC): Primary | ICD-10-CM

## 2024-02-08 LAB
RAD ONC ARIA COURSE FIRST TREATMENT DATE: NORMAL
RAD ONC ARIA COURSE ID: NORMAL
RAD ONC ARIA COURSE INTENT: NORMAL
RAD ONC ARIA COURSE LAST TREATMENT DATE: NORMAL
RAD ONC ARIA COURSE SESSION NUMBER: 2
RAD ONC ARIA COURSE TREATMENT ELAPSED DAYS: 2
RAD ONC ARIA PLAN FRACTIONS TREATED TO DATE: 2
RAD ONC ARIA PLAN ID: NORMAL
RAD ONC ARIA PLAN PRESCRIBED DOSE PER FRACTION: 7.25 GY
RAD ONC ARIA PLAN PRIMARY REFERENCE POINT: NORMAL
RAD ONC ARIA PLAN TOTAL FRACTIONS PRESCRIBED: 5
RAD ONC ARIA PLAN TOTAL PRESCRIBED DOSE: 3625 CGY
RAD ONC ARIA REFERENCE POINT DOSAGE GIVEN TO DATE: 14.5 GY
RAD ONC ARIA REFERENCE POINT DOSAGE GIVEN TO DATE: 16 GY
RAD ONC ARIA REFERENCE POINT DOSAGE GIVEN TO DATE: 16.33 GY
RAD ONC ARIA REFERENCE POINT DOSAGE GIVEN TO DATE: 18 GY
RAD ONC ARIA REFERENCE POINT ID: NORMAL
RAD ONC ARIA REFERENCE POINT SESSION DOSAGE GIVEN: 7.25 GY
RAD ONC ARIA REFERENCE POINT SESSION DOSAGE GIVEN: 8 GY
RAD ONC ARIA REFERENCE POINT SESSION DOSAGE GIVEN: 8.16 GY
RAD ONC ARIA REFERENCE POINT SESSION DOSAGE GIVEN: 9 GY

## 2024-02-08 RX ORDER — TAMSULOSIN HYDROCHLORIDE 0.4 MG/1
0.4 CAPSULE ORAL
Qty: 30 CAPSULE | Refills: 4 | Status: SHIPPED | OUTPATIENT
Start: 2024-02-08

## 2024-02-08 NOTE — PROGRESS NOTES
pravastatin (PRAVACHOL) 20 MG tablet One pill at night    tamsulosin (FLOMAX) 0.4 mg, Oral, DAILY WITH DINNER        Assessment & Plan   - Continue radiation treatment as planned  - Treatment setup and plan reviewed. Port images/CBCT images reviewed. Appropriate laboratory work was reviewed.   - Treatment side effects and toxicities reviewed with the patient, and appropriate management was advised as detailed above.

## 2024-02-09 ENCOUNTER — PATIENT MESSAGE (OUTPATIENT)
Facility: CLINIC | Age: 79
End: 2024-02-09

## 2024-02-09 NOTE — TELEPHONE ENCOUNTER
LMTCB x1 02/09/2024 0840        From: Donnell HERNANDEZ Form  To: Dr. Caryn Huynh  Sent: 2/8/2024  8:56 PM EST  Subject: Appointment Request    Appointment Request From: Donnell HERNANDEZ Form    With Provider: Caryn Huynh MD [[facility]]    Preferred Date Range: 2/12/2024 - 2/16/2024    Preferred Times: Monday Afternoon, Tuesday Afternoon, Thursday Afternoon, Friday Afternoon    Reason for visit: Request an Appointment    Comments:  Any day next week, in the afternoon, except Wednesday. Can't come Wednesday

## 2024-02-12 ENCOUNTER — HOSPITAL ENCOUNTER (OUTPATIENT)
Facility: HOSPITAL | Age: 79
Discharge: HOME | End: 2024-02-15
Attending: RADIOLOGY

## 2024-02-12 ENCOUNTER — OFFICE VISIT (OUTPATIENT)
Facility: CLINIC | Age: 79
End: 2024-02-12
Payer: MEDICARE

## 2024-02-12 VITALS
DIASTOLIC BLOOD PRESSURE: 60 MMHG | BODY MASS INDEX: 26.21 KG/M2 | TEMPERATURE: 97.3 F | WEIGHT: 167 LBS | HEIGHT: 67 IN | SYSTOLIC BLOOD PRESSURE: 120 MMHG | HEART RATE: 104 BPM | OXYGEN SATURATION: 99 %

## 2024-02-12 DIAGNOSIS — J40 BRONCHITIS: Primary | ICD-10-CM

## 2024-02-12 DIAGNOSIS — C61 PROSTATE CANCER (HCC): ICD-10-CM

## 2024-02-12 LAB
RAD ONC ARIA COURSE FIRST TREATMENT DATE: NORMAL
RAD ONC ARIA COURSE ID: NORMAL
RAD ONC ARIA COURSE INTENT: NORMAL
RAD ONC ARIA COURSE LAST TREATMENT DATE: NORMAL
RAD ONC ARIA COURSE SESSION NUMBER: 3
RAD ONC ARIA COURSE TREATMENT ELAPSED DAYS: 6
RAD ONC ARIA PLAN FRACTIONS TREATED TO DATE: 3
RAD ONC ARIA PLAN ID: NORMAL
RAD ONC ARIA PLAN PRESCRIBED DOSE PER FRACTION: 7.25 GY
RAD ONC ARIA PLAN PRIMARY REFERENCE POINT: NORMAL
RAD ONC ARIA PLAN TOTAL FRACTIONS PRESCRIBED: 5
RAD ONC ARIA PLAN TOTAL PRESCRIBED DOSE: 3625 CGY
RAD ONC ARIA REFERENCE POINT DOSAGE GIVEN TO DATE: 21.75 GY
RAD ONC ARIA REFERENCE POINT DOSAGE GIVEN TO DATE: 24 GY
RAD ONC ARIA REFERENCE POINT DOSAGE GIVEN TO DATE: 24.49 GY
RAD ONC ARIA REFERENCE POINT DOSAGE GIVEN TO DATE: 27 GY
RAD ONC ARIA REFERENCE POINT ID: NORMAL
RAD ONC ARIA REFERENCE POINT SESSION DOSAGE GIVEN: 7.25 GY
RAD ONC ARIA REFERENCE POINT SESSION DOSAGE GIVEN: 8.16 GY
RAD ONC ARIA REFERENCE POINT SESSION DOSAGE GIVEN: 9 GY

## 2024-02-12 PROCEDURE — G8427 DOCREV CUR MEDS BY ELIG CLIN: HCPCS | Performed by: FAMILY MEDICINE

## 2024-02-12 PROCEDURE — 3078F DIAST BP <80 MM HG: CPT | Performed by: FAMILY MEDICINE

## 2024-02-12 PROCEDURE — 1036F TOBACCO NON-USER: CPT | Performed by: FAMILY MEDICINE

## 2024-02-12 PROCEDURE — 99214 OFFICE O/P EST MOD 30 MIN: CPT | Performed by: FAMILY MEDICINE

## 2024-02-12 PROCEDURE — G8484 FLU IMMUNIZE NO ADMIN: HCPCS | Performed by: FAMILY MEDICINE

## 2024-02-12 PROCEDURE — 1123F ACP DISCUSS/DSCN MKR DOCD: CPT | Performed by: FAMILY MEDICINE

## 2024-02-12 PROCEDURE — G8419 CALC BMI OUT NRM PARAM NOF/U: HCPCS | Performed by: FAMILY MEDICINE

## 2024-02-12 PROCEDURE — 3074F SYST BP LT 130 MM HG: CPT | Performed by: FAMILY MEDICINE

## 2024-02-12 RX ORDER — DOXYCYCLINE HYCLATE 100 MG
100 TABLET ORAL 2 TIMES DAILY
Qty: 14 TABLET | Refills: 0 | Status: SHIPPED | OUTPATIENT
Start: 2024-02-12 | End: 2024-02-19

## 2024-02-12 NOTE — PROGRESS NOTES
[facility]      HPI: Pt is a 78 y.o. male who presents for cough. Has been present for at least 3 weeks. Sometimes productive of small amount of phlegm. Associated with chest congestion. No fevers, ear pain or sore throat. He had seen [facility] and was tested for flu and COVID which were negative. Since then his symptoms have not improved at all. He tried Tessalon perfles which didn't help, and some Dayquil and NyQuil which helped a little. Of note, he is currently undergoing radiation therapy for prostate cancer.       Past Medical History:   Diagnosis Date    Allergies     Arthritis     DM (diabetes mellitus) (HCC)     Hearing loss 1/1/13    Actual date unknown. Have hearing aids.    Hyperlipidemia     Prostate cancer (HCC)     Sciatica        Family History   Problem Relation Age of Onset    Diabetes Mother     Alzheimer's Disease Father     Cancer Father         Lung cancer    Prostate Cancer Father     Diabetes Brother         Prostate cancer    Diabetes Maternal Grandmother     Diabetes Maternal Grandfather        Social History     Tobacco Use    Smoking status: Never    Smokeless tobacco: Never   Substance Use Topics    Alcohol use: Yes     Comment: rarely    Drug use: Never       ROS:  Per HPI    PE:  /60 (Site: Left Upper Arm, Position: Sitting)   Pulse (!) 104   Temp 97.3 °F (36.3 °C) (Temporal)   Ht 1.702 m (5' 7\")   Wt 75.8 kg (167 lb)   SpO2 99%   BMI 26.16 kg/m²   Gen: Pt sitting in chair, in NAD  Head: Normocephalic, atraumatic  Eyes: Sclera anicteric, EOM grossly intact, PERRL  Ears: TM's pearly with good light reflex b/l. Small ear canals, +hair.   Nose: Normal nasal mucosa  Throat: MMM, normal lips, tongue, teeth and gums  Neck: Supple  CVS: Normal S1, S2, no m/r/g  Resp: CTAB, no wheezes or rales  Extrem: Atraumatic, no cyanosis  Pulses: 2+   Skin: Warm, dry  Neuro: Alert, oriented, appropriate      A/P:     ICD-10-CM    1. Bronchitis  J40 doxycycline hyclate

## 2024-02-12 NOTE — PROGRESS NOTES
Chief Complaint   Patient presents with    Cough     Went to [facility] 3 weeks ago for same issue was tested for Flu, Covid and Strep and all negative.  Currently 60% through with Chemo for prostate cancer.       \"Have you been to the ER, urgent care clinic since your last visit?  Hospitalized since your last visit?\"    Yes [facility] 3 weeks ago.    “Have you seen or consulted any other health care providers outside of [facility] since your last visit?”    NO

## 2024-02-13 ENCOUNTER — TELEPHONE (OUTPATIENT)
Facility: CLINIC | Age: 79
End: 2024-02-13

## 2024-02-13 ENCOUNTER — OFFICE VISIT (OUTPATIENT)
Age: 79
End: 2024-02-13
Payer: MEDICARE

## 2024-02-13 VITALS
RESPIRATION RATE: 16 BRPM | OXYGEN SATURATION: 99 % | HEIGHT: 67 IN | WEIGHT: 165.2 LBS | DIASTOLIC BLOOD PRESSURE: 59 MMHG | HEART RATE: 82 BPM | SYSTOLIC BLOOD PRESSURE: 120 MMHG | TEMPERATURE: 97.1 F | BODY MASS INDEX: 25.93 KG/M2

## 2024-02-13 DIAGNOSIS — I10 ESSENTIAL (PRIMARY) HYPERTENSION: ICD-10-CM

## 2024-02-13 DIAGNOSIS — E11.65 TYPE 2 DIABETES MELLITUS WITH HYPERGLYCEMIA, WITHOUT LONG-TERM CURRENT USE OF INSULIN (HCC): Primary | ICD-10-CM

## 2024-02-13 DIAGNOSIS — E78.2 MIXED HYPERLIPIDEMIA: ICD-10-CM

## 2024-02-13 DIAGNOSIS — Z79.4 ENCOUNTER FOR LONG-TERM (CURRENT) USE OF INSULIN (HCC): ICD-10-CM

## 2024-02-13 DIAGNOSIS — J40 BRONCHITIS: Primary | ICD-10-CM

## 2024-02-13 PROCEDURE — G8427 DOCREV CUR MEDS BY ELIG CLIN: HCPCS | Performed by: INTERNAL MEDICINE

## 2024-02-13 PROCEDURE — G8484 FLU IMMUNIZE NO ADMIN: HCPCS | Performed by: INTERNAL MEDICINE

## 2024-02-13 PROCEDURE — 99215 OFFICE O/P EST HI 40 MIN: CPT | Performed by: INTERNAL MEDICINE

## 2024-02-13 PROCEDURE — 1123F ACP DISCUSS/DSCN MKR DOCD: CPT | Performed by: INTERNAL MEDICINE

## 2024-02-13 PROCEDURE — G8419 CALC BMI OUT NRM PARAM NOF/U: HCPCS | Performed by: INTERNAL MEDICINE

## 2024-02-13 PROCEDURE — 1036F TOBACCO NON-USER: CPT | Performed by: INTERNAL MEDICINE

## 2024-02-13 PROCEDURE — 3074F SYST BP LT 130 MM HG: CPT | Performed by: INTERNAL MEDICINE

## 2024-02-13 PROCEDURE — 3052F HG A1C>EQUAL 8.0%<EQUAL 9.0%: CPT | Performed by: INTERNAL MEDICINE

## 2024-02-13 PROCEDURE — 3078F DIAST BP <80 MM HG: CPT | Performed by: INTERNAL MEDICINE

## 2024-02-13 RX ORDER — PEN NEEDLE, DIABETIC 30 GX3/16"
NEEDLE, DISPOSABLE MISCELLANEOUS
Qty: 100 EACH | Refills: 3 | Status: SHIPPED | OUTPATIENT
Start: 2024-02-13

## 2024-02-13 RX ORDER — BLOOD-GLUCOSE METER
1 KIT MISCELLANEOUS DAILY
Qty: 1 KIT | Refills: 0 | Status: SHIPPED | OUTPATIENT
Start: 2024-02-13

## 2024-02-13 RX ORDER — BENZONATATE 200 MG/1
200 CAPSULE ORAL 3 TIMES DAILY PRN
Qty: 30 CAPSULE | Refills: 0 | Status: SHIPPED | OUTPATIENT
Start: 2024-02-13 | End: 2024-02-20

## 2024-02-13 RX ORDER — BLOOD SUGAR DIAGNOSTIC
STRIP MISCELLANEOUS
Qty: 300 EACH | Refills: 3 | Status: SHIPPED | OUTPATIENT
Start: 2024-02-13

## 2024-02-13 RX ORDER — INSULIN GLARGINE 100 [IU]/ML
INJECTION, SOLUTION SUBCUTANEOUS
Qty: 15 ML | Refills: 3 | Status: SHIPPED | OUTPATIENT
Start: 2024-02-13

## 2024-02-13 RX ORDER — GLUCOSAMINE HCL/CHONDROITIN SU 500-400 MG
CAPSULE ORAL
Qty: 300 STRIP | Refills: 3 | Status: SHIPPED | OUTPATIENT
Start: 2024-02-13 | End: 2024-02-13 | Stop reason: ALTCHOICE

## 2024-02-13 RX ORDER — REPAGLINIDE 2 MG/1
TABLET ORAL
Qty: 270 TABLET | Refills: 3 | Status: SHIPPED | OUTPATIENT
Start: 2024-02-13

## 2024-02-13 NOTE — TELEPHONE ENCOUNTER
I sent the doxycycline in yesterday at the time of his visit. It should be at the pharmacy for him. Thanks!

## 2024-02-13 NOTE — TELEPHONE ENCOUNTER
He stated that it was not Doxycycline it was the other medication that was discussed at the appointment.

## 2024-02-13 NOTE — PATIENT INSTRUCTIONS
SPECIFIC INSTRUCTIONS BELOW     Stay  on pravachol 20 mg at night       Stay  on  metformin  500 mg to 2 pills  twice a day with meals      continue  On januvia 100 mg a day  ( pt assistance )      Continue on actos      STOP Glipizide     Start on lantus    at  30 units  at  bed time  ( increase it  by 6 units every  4  days  to aim for  fasting  sugars below 120 mg  )    MAX is   50 units     Less than 70 mg - no insulin       Start on  repaglinide   2 mg  right before each meal   Do not take it if you are not eating   Cut the pill to half if eating lesser than normal   Do not avoid lunches              -------------PAY ATTENTION TO THESE GENERAL INSTRUCTIONS -----------------      - The medications prescribed at this visit will not be available at pharmacy until 6 pm       - YOUR MED LIST IS NOT UP TO DATE AS SOME CHANGES ARE BEING MADE AFTER THE VISIT - FOLLOW SPECIFIC INSTRUCTIONS  ABOVE     -ANY tests other than blood work, which you opt to do  outside the  [facility] imaging facilities, you are responsible for prior authorizations if  required    - HEALTH MAINTENANCE IS NOT GOING TO BE UP TO DATE ON YOUR AVS- PLEASE IGNORE     Results     *Normal results will not be notified by a phone call starting January 1 2021   *If you have an upcoming visit, the results will be discussed at the visit   *Please sign up for MY CHART if you want access to your lab and test results  *Abnormal results which require immediate attention will be notified by phone call   *Abnormal results which do not require immediate assistance will be notified in 1-2 weeks       Refills    -    have your pharmacy send us a refill request . Refills are done max for one year and a visit is a must before refills are extended    Follow up appointments -  highly encourage you to make it when you are checking out. We can accommodate you into the schedule based on your clinical situation, but not for extending refills beyond a year. Labs are

## 2024-02-13 NOTE — PROGRESS NOTES
[facility] DIABETES AND ENDOCRINOLOGY                Evelin Underwood MD FACE       HISTORY OF PRESENT ILLNESS   Donnell Moreno is a 78  y.o.  male.   HPI   Patient here for f/u   After  visit of  Type 2 diabetes mellitus from August 2023     Lost 2 lbs   Diagnosed  with prostrate cancer ,  s/p brachy therapy  -    Now feeling dizzy   from use of flomax      His sugars are very high    Log reviewed and he expresses interest   in starting insulin       August 2023     Lost 6 lbs   He had severe  pain issues, spine    ? Pyriformis syndrome   He finally saw PT  and had stretches  and that relieved  his pain       Feb 2023       No meter    He did not walk he says since July 2022, after wife had knee surgery    Gained 9 lbs ( winter wear )          Referred : by pcp      H/o diabetes for 10  years    Current A1C is 8.7 %   From   March 8 2017  and symptoms/problems include fluctuant sugars  and polyuria    Current diabetic medications include glipizide xr 2.5 mg a day and metformin.    Cardiovascular risk factors: dyslipidemia, diabetes mellitus, obesity, male gender, hypertension, stress         Review of Systems    Constitutional: Negative.     Neurological: Negative.         Physical Exam    Constitutional: He is oriented to person, place, and time. He appears well-developed and well-nourished.    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

## 2024-02-13 NOTE — PROGRESS NOTES
Donnell Moreno is a 78 y.o. male here for   Chief Complaint   Patient presents with    Diabetes     6 month follow up       All medication reviewed.     Vitals:    02/13/24 0858   BP: (!) 120/59   Site: Left Upper Arm   Position: Sitting   Cuff Size: Medium Adult   Pulse: 82   Resp: 16   Temp: 97.1 °F (36.2 °C)   TempSrc: Temporal   SpO2: 99%   Weight: 74.9 kg (165 lb 3.2 oz)   Height: 1.702 m (5' 7\")        1. Have you been to the ER, urgent care clinic since your last visit?  Hospitalized since your last visit? -No    2. Have you seen or consulted any other health care providers outside of the [facility] since your last visit?  Include any pap smears or colon screening.-No

## 2024-02-13 NOTE — TELEPHONE ENCOUNTER
Patient sent a [employer] message about his appointment he had on 2/12/24 with DIANE. He stated she suggested a medication for him to take but he did not want to take it at the time, he now would like to be prescribed that medication. The medication is Doxycycline 100 mg.    Last appt: 2.12.24 with DIANE  Next appt: 4.22.24 with DIANE    [facility] [address] [phone]

## 2024-02-14 ENCOUNTER — HOSPITAL ENCOUNTER (OUTPATIENT)
Facility: HOSPITAL | Age: 79
Discharge: HOME | End: 2024-02-17
Attending: RADIOLOGY

## 2024-02-14 LAB
RAD ONC ARIA COURSE FIRST TREATMENT DATE: NORMAL
RAD ONC ARIA COURSE ID: NORMAL
RAD ONC ARIA COURSE INTENT: NORMAL
RAD ONC ARIA COURSE LAST TREATMENT DATE: NORMAL
RAD ONC ARIA COURSE SESSION NUMBER: 4
RAD ONC ARIA COURSE TREATMENT ELAPSED DAYS: 8
RAD ONC ARIA PLAN FRACTIONS TREATED TO DATE: 4
RAD ONC ARIA PLAN ID: NORMAL
RAD ONC ARIA PLAN PRESCRIBED DOSE PER FRACTION: 7.25 GY
RAD ONC ARIA PLAN PRIMARY REFERENCE POINT: NORMAL
RAD ONC ARIA PLAN TOTAL FRACTIONS PRESCRIBED: 5
RAD ONC ARIA PLAN TOTAL PRESCRIBED DOSE: 3625 CGY
RAD ONC ARIA REFERENCE POINT DOSAGE GIVEN TO DATE: 29 GY
RAD ONC ARIA REFERENCE POINT DOSAGE GIVEN TO DATE: 32 GY
RAD ONC ARIA REFERENCE POINT DOSAGE GIVEN TO DATE: 32.66 GY
RAD ONC ARIA REFERENCE POINT DOSAGE GIVEN TO DATE: 36 GY
RAD ONC ARIA REFERENCE POINT ID: NORMAL
RAD ONC ARIA REFERENCE POINT SESSION DOSAGE GIVEN: 7.25 GY
RAD ONC ARIA REFERENCE POINT SESSION DOSAGE GIVEN: 8 GY
RAD ONC ARIA REFERENCE POINT SESSION DOSAGE GIVEN: 8.16 GY
RAD ONC ARIA REFERENCE POINT SESSION DOSAGE GIVEN: 9 GY

## 2024-02-15 ENCOUNTER — TELEPHONE (OUTPATIENT)
Age: 79
End: 2024-02-15

## 2024-02-15 DIAGNOSIS — E11.65 TYPE 2 DIABETES MELLITUS WITH HYPERGLYCEMIA, WITHOUT LONG-TERM CURRENT USE OF INSULIN (HCC): Primary | ICD-10-CM

## 2024-02-15 DIAGNOSIS — Z79.4 ENCOUNTER FOR LONG-TERM (CURRENT) USE OF INSULIN (HCC): ICD-10-CM

## 2024-02-15 RX ORDER — CALCIUM CITRATE/VITAMIN D3 200MG-6.25
TABLET ORAL
Qty: 300 EACH | Refills: 3 | Status: SHIPPED | OUTPATIENT
Start: 2024-02-15

## 2024-02-15 NOTE — TELEPHONE ENCOUNTER
Patient went to "[employer] and they did not accept his Medicare card for payment/insurance - he thinks we are responsible for getting his insurance correct. He says he currently has a True Metrix meter and to write a prescription for test strips for him. Please call and advise patient. Thank you.

## 2024-02-15 NOTE — TELEPHONE ENCOUNTER
Message left for Mr. Moreno to call office.    Spoke with [employer] and products are not covered. Asked him to speak directly with Arianna

## 2024-02-16 ENCOUNTER — HOSPITAL ENCOUNTER (OUTPATIENT)
Facility: HOSPITAL | Age: 79
Discharge: HOME | End: 2024-02-19
Attending: RADIOLOGY

## 2024-02-16 ENCOUNTER — CLINICAL DOCUMENTATION (OUTPATIENT)
Facility: HOSPITAL | Age: 79
End: 2024-02-16

## 2024-02-16 DIAGNOSIS — C61 PROSTATE CANCER (HCC): Primary | ICD-10-CM

## 2024-02-16 LAB
RAD ONC ARIA COURSE FIRST TREATMENT DATE: NORMAL
RAD ONC ARIA COURSE ID: NORMAL
RAD ONC ARIA COURSE INTENT: NORMAL
RAD ONC ARIA COURSE LAST TREATMENT DATE: NORMAL
RAD ONC ARIA COURSE SESSION NUMBER: 5
RAD ONC ARIA COURSE TREATMENT ELAPSED DAYS: 10
RAD ONC ARIA PLAN FRACTIONS TREATED TO DATE: 5
RAD ONC ARIA PLAN ID: NORMAL
RAD ONC ARIA PLAN PRESCRIBED DOSE PER FRACTION: 7.25 GY
RAD ONC ARIA PLAN PRIMARY REFERENCE POINT: NORMAL
RAD ONC ARIA PLAN TOTAL FRACTIONS PRESCRIBED: 5
RAD ONC ARIA PLAN TOTAL PRESCRIBED DOSE: 3625 CGY
RAD ONC ARIA REFERENCE POINT DOSAGE GIVEN TO DATE: 36.25 GY
RAD ONC ARIA REFERENCE POINT DOSAGE GIVEN TO DATE: 40 GY
RAD ONC ARIA REFERENCE POINT DOSAGE GIVEN TO DATE: 40.82 GY
RAD ONC ARIA REFERENCE POINT DOSAGE GIVEN TO DATE: 45 GY
RAD ONC ARIA REFERENCE POINT ID: NORMAL
RAD ONC ARIA REFERENCE POINT SESSION DOSAGE GIVEN: 7.25 GY
RAD ONC ARIA REFERENCE POINT SESSION DOSAGE GIVEN: 8 GY
RAD ONC ARIA REFERENCE POINT SESSION DOSAGE GIVEN: 8.16 GY
RAD ONC ARIA REFERENCE POINT SESSION DOSAGE GIVEN: 9 GY

## 2024-02-16 NOTE — TELEPHONE ENCOUNTER
Patient called back, he spoke with Medicare and they are telling him that everything is fine.  Supplies should be going thru.  He will connect with [employer] again

## 2024-03-01 ENCOUNTER — OFFICE VISIT (OUTPATIENT)
Age: 79
End: 2024-03-01
Payer: MEDICARE

## 2024-03-01 VITALS
BODY MASS INDEX: 27.8 KG/M2 | HEART RATE: 74 BPM | WEIGHT: 173 LBS | HEIGHT: 66 IN | RESPIRATION RATE: 18 BRPM | TEMPERATURE: 97.7 F | DIASTOLIC BLOOD PRESSURE: 77 MMHG | SYSTOLIC BLOOD PRESSURE: 157 MMHG

## 2024-03-01 DIAGNOSIS — E11.65 TYPE 2 DIABETES MELLITUS WITH HYPERGLYCEMIA, WITHOUT LONG-TERM CURRENT USE OF INSULIN (HCC): Primary | ICD-10-CM

## 2024-03-01 DIAGNOSIS — I10 ESSENTIAL (PRIMARY) HYPERTENSION: ICD-10-CM

## 2024-03-01 DIAGNOSIS — E78.2 MIXED HYPERLIPIDEMIA: ICD-10-CM

## 2024-03-01 DIAGNOSIS — Z79.4 ENCOUNTER FOR LONG-TERM (CURRENT) USE OF INSULIN (HCC): ICD-10-CM

## 2024-03-01 PROCEDURE — 3078F DIAST BP <80 MM HG: CPT | Performed by: INTERNAL MEDICINE

## 2024-03-01 PROCEDURE — G8419 CALC BMI OUT NRM PARAM NOF/U: HCPCS | Performed by: INTERNAL MEDICINE

## 2024-03-01 PROCEDURE — 1123F ACP DISCUSS/DSCN MKR DOCD: CPT | Performed by: INTERNAL MEDICINE

## 2024-03-01 PROCEDURE — 3077F SYST BP >= 140 MM HG: CPT | Performed by: INTERNAL MEDICINE

## 2024-03-01 PROCEDURE — 99215 OFFICE O/P EST HI 40 MIN: CPT | Performed by: INTERNAL MEDICINE

## 2024-03-01 PROCEDURE — G8427 DOCREV CUR MEDS BY ELIG CLIN: HCPCS | Performed by: INTERNAL MEDICINE

## 2024-03-01 PROCEDURE — 1036F TOBACCO NON-USER: CPT | Performed by: INTERNAL MEDICINE

## 2024-03-01 PROCEDURE — 3052F HG A1C>EQUAL 8.0%<EQUAL 9.0%: CPT | Performed by: INTERNAL MEDICINE

## 2024-03-01 PROCEDURE — G8484 FLU IMMUNIZE NO ADMIN: HCPCS | Performed by: INTERNAL MEDICINE

## 2024-03-01 RX ORDER — INSULIN GLARGINE 100 [IU]/ML
INJECTION, SOLUTION SUBCUTANEOUS
Qty: 15 ML | Refills: 3 | Status: SHIPPED | OUTPATIENT
Start: 2024-03-01

## 2024-03-01 NOTE — PROGRESS NOTES
[facility] DIABETES AND ENDOCRINOLOGY                Evelin Underwood MD FACE       HISTORY OF PRESENT ILLNESS   Donnell Moreno is a 78  y.o.  male.   HPI   Patient here for f/u   After  visit of  Type 2 diabetes mellitus from  Feb 13 2024     His low sugars began   on feb 17th , below 70 mg   He has had low sugars every day  He increased lantus to  42  units a day     He is now experiencing Tremors - 199 mg         Feb 2024      Lost 2 lbs   Diagnosed  with prostrate cancer ,  s/p brachy therapy  -    Now feeling dizzy   from use of flomax    His sugars are very high    Log reviewed and he expresses interest   in starting insulin       August 2023     Lost 6 lbs   He had severe  pain issues, spine    ? Pyriformis syndrome   He finally saw PT  and had stretches  and that relieved  his pain       Feb 2023       No meter    He did not walk he says since July 2022, after wife had knee surgery    Gained 9 lbs ( winter wear )          Referred : by pcp      H/o diabetes for 10  years    Current A1C is 8.7 %   From   March 8 2017  and symptoms/problems include fluctuant sugars  and polyuria    Current diabetic medications include glipizide xr 2.5 mg a day and metformin.    Cardiovascular risk factors: dyslipidemia, diabetes mellitus, obesity, male gender, hypertension, stress         Review of Systems    Constitutional: Negative.     Neurological: Negative.         Physical Exam    Constitutional: He is oriented to person, place, and time. He appears well-developed and well-nourished.    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

## 2024-03-01 NOTE — PATIENT INSTRUCTIONS
SPECIFIC INSTRUCTIONS BELOW     70  to  120 mg   fasting     Pre meal  70  to  160  mg     Post meals  max to be below 200  mg        Follow the steps  to get started on  DEXCOM / freestyle Roscoe 2  for Medicare patients       Keep a log of blood sugars by checking 4 times a day    you must be on insulin shots at least 3 times a day   3.  Contact one of the following  DME suppliers  and find out who accepts your plan   4. Give that supplier  our  office info  and fax number       [employer]  is best  for  freestyle ROSCOE 2 - [phone]       Home care delivered   - 6        [facility] MEDICAL SUPPLY  [phone]    [employer] [facility] [employer]   [phone]         ---------------------------------------------------------------------------------------------------------------------------------------------            ----------------------------------------------------------------------------------------------------------------    Stay  on pravachol 20 mg at night       Stay  on  metformin  500 mg to 2 pills  twice a day with meals      continue  On januvia 100 mg a day  ( pt assistance )      Continue on actos       Decrease  lantus    at  24  units  at  bed time        Less than 70 mg - no insulin       Stay on  repaglinide   2 mg  right before each meal   Do not take it if you are not eating   Cut the pill to half if eating lesser than normal   Do not avoid lunches              -------------PAY ATTENTION TO THESE GENERAL INSTRUCTIONS -----------------      - The medications prescribed at this visit will not be available at pharmacy until 6 pm       - YOUR MED LIST IS NOT UP TO DATE AS SOME CHANGES ARE BEING MADE AFTER THE VISIT - FOLLOW SPECIFIC INSTRUCTIONS  ABOVE     -ANY tests other than blood work, which you opt to do  outside the  [facility] imaging facilities, you are responsible for prior authorizations if  required    - HEALTH MAINTENANCE IS NOT GOING TO BE UP

## 2024-03-01 NOTE — PROGRESS NOTES
Identified pt with two pt identifiers(name and ). Reviewed record in preparation for visit and have obtained necessary documentation.  Chief Complaint   Patient presents with    Diabetes        Health Maintenance Due   Topic    Hepatitis C screen     Prostate Specific Antigen (PSA) Screening or Monitoring     Annual Wellness Visit (Medicare)        Vitals:    24 0958 24 1000   BP: (!) 148/77 (!) 157/77   Site: Right Upper Arm    Position: Sitting    Cuff Size: Large Adult    Pulse: 74 74   Resp: 18    Temp: 97.7 °F (36.5 °C)    TempSrc: Oral    Weight: 78.5 kg (173 lb)    Height: 1.676 m (5' 6\")         Coordination of Care Questionnaire:  :   1) Have you been to an emergency room, urgent care, or hospitalized since your last visit?  If yes, where when, and reason for visit? No    2. Have seen or consulted any other health care provider since your last visit?   If yes, where when, and reason for visit?  No      3)   Patient is accompanied by SELF I have received verbal consent from Donnell D Form to discuss any/all medical information while they are present in the room.

## 2024-04-05 ENCOUNTER — OFFICE VISIT (OUTPATIENT)
Age: 79
End: 2024-04-05
Payer: MEDICARE

## 2024-04-05 VITALS
HEART RATE: 74 BPM | WEIGHT: 175.2 LBS | DIASTOLIC BLOOD PRESSURE: 74 MMHG | TEMPERATURE: 97.5 F | OXYGEN SATURATION: 98 % | HEIGHT: 67 IN | SYSTOLIC BLOOD PRESSURE: 177 MMHG | BODY MASS INDEX: 27.5 KG/M2

## 2024-04-05 DIAGNOSIS — Z79.4 ENCOUNTER FOR LONG-TERM (CURRENT) USE OF INSULIN (HCC): ICD-10-CM

## 2024-04-05 DIAGNOSIS — I10 ESSENTIAL (PRIMARY) HYPERTENSION: ICD-10-CM

## 2024-04-05 DIAGNOSIS — E11.65 TYPE 2 DIABETES MELLITUS WITH HYPERGLYCEMIA, WITHOUT LONG-TERM CURRENT USE OF INSULIN (HCC): Primary | ICD-10-CM

## 2024-04-05 DIAGNOSIS — E78.2 MIXED HYPERLIPIDEMIA: ICD-10-CM

## 2024-04-05 PROCEDURE — 3052F HG A1C>EQUAL 8.0%<EQUAL 9.0%: CPT | Performed by: INTERNAL MEDICINE

## 2024-04-05 PROCEDURE — G8427 DOCREV CUR MEDS BY ELIG CLIN: HCPCS | Performed by: INTERNAL MEDICINE

## 2024-04-05 PROCEDURE — 1123F ACP DISCUSS/DSCN MKR DOCD: CPT | Performed by: INTERNAL MEDICINE

## 2024-04-05 PROCEDURE — 95251 CONT GLUC MNTR ANALYSIS I&R: CPT | Performed by: INTERNAL MEDICINE

## 2024-04-05 PROCEDURE — G8419 CALC BMI OUT NRM PARAM NOF/U: HCPCS | Performed by: INTERNAL MEDICINE

## 2024-04-05 PROCEDURE — 1036F TOBACCO NON-USER: CPT | Performed by: INTERNAL MEDICINE

## 2024-04-05 PROCEDURE — 3078F DIAST BP <80 MM HG: CPT | Performed by: INTERNAL MEDICINE

## 2024-04-05 PROCEDURE — 3077F SYST BP >= 140 MM HG: CPT | Performed by: INTERNAL MEDICINE

## 2024-04-05 PROCEDURE — 99214 OFFICE O/P EST MOD 30 MIN: CPT | Performed by: INTERNAL MEDICINE

## 2024-04-05 RX ORDER — INSULIN GLARGINE 100 [IU]/ML
INJECTION, SOLUTION SUBCUTANEOUS
Qty: 18 ML | Refills: 3 | Status: SHIPPED | OUTPATIENT
Start: 2024-04-05

## 2024-04-05 NOTE — PROGRESS NOTES
[facility] DIABETES AND ENDOCRINOLOGY                Evelin Underwood MD FACE       HISTORY OF PRESENT ILLNESS   Donnell Moreno is a 78  y.o.  male.   HPI   Patient here for f/u   After  visit of  Type 2 diabetes mellitus from  March 1 2024     He still continued to have fasting low sugars despite decreasing Lantus  He got the Roscoe  Pharmacy hinted him to ask me for higher dose to save a co-pay       March 2024      His low sugars began   on feb 17th , below 70 mg   He has had low sugars every day  He increased lantus to  42  units a day   He is now experiencing Tremors - 199 mg         Feb 2024      Lost 2 lbs   Diagnosed  with prostrate cancer ,  s/p brachy therapy  -    Now feeling dizzy   from use of flomax    His sugars are very high    Log reviewed and he expresses interest   in starting insulin       August 2023     Lost 6 lbs   He had severe  pain issues, spine    ? Pyriformis syndrome   He finally saw PT  and had stretches  and that relieved  his pain       Feb 2023       No meter    He did not walk he says since July 2022, after wife had knee surgery    Gained 9 lbs ( winter wear )          Referred : by pcp      H/o diabetes for 10  years    Current A1C is 8.7 %   From   March 8 2017  and symptoms/problems include fluctuant sugars  and polyuria    Current diabetic medications include glipizide xr 2.5 mg a day and metformin.    Cardiovascular risk factors: dyslipidemia, diabetes mellitus, obesity, male gender, hypertension, stress         Review of Systems    Constitutional: Negative.     Neurological: Negative.         Physical Exam    Constitutional: He is oriented to person, place, and time. He appears well-developed and well-nourished.    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

## 2024-04-05 NOTE — PATIENT INSTRUCTIONS
situation, but not for extending refills beyond a year. Labs are important to give refills and is important to get labs before the visit     Phone calls  -  Allow  24 hrs. for non-urgent calls to be returned  Prior authorization - It may take 2-4 weeks to process  Forms  -  FMLA, DMV etc., will take up to 2 weeks to process  Cancellations - please notify the office 2 days in advance   Samples  - will only be dispensed at visits       If not showing for the appointments and cancelling appointments within 24 hours are kept track of and three  of such situations in  two consecutive years will likely be considered for termination from the practice    -------------------------------------------------------------------------------------------------------------------

## 2024-04-08 NOTE — PROGRESS NOTES
[facility] [place] [facility]  [facility]      RADIATION ONCOLOGY CLINICAL END OF TREATMENT NOTE    Encounter Date: 04/08/24   Patient Name: Donnell Moreno  YOB: 1945  Medical Record Number: [id number]    DIAGNOSIS AND STAGING:       ICD-10-CM    1. Prostate cancer (HCC)  C61          Cancer Staging   No matching staging information was found for the patient.  AJCC Staging has been reviewed    CLINICAL SUMMARY:   Unfavorable intermediate risk prostate cancer, [place] 4 + 3, PSA 10.99 ng/mL, T1c. Declined ADT against medical advice. Definitive radiation with prostate SBRT, 7.25 Gy x 5, with CTV boosted to 8 Gy x 5, and MRI/PSMA lesion microboost 9 Gy x 5, completed 2/16/2024.  Diagnosed in 2015 with very low risk prostate cancer and managed initially with active surveillance.      TREATMENT SUMMARY:     Course: C1    Treatment Site Ref. ID Energy Dose/Fx (cGy) #Fx Dose Correction (cGy) Total Dose (cGy) Start Date End Date Elapsed Days   SBRT p/SV    [id number] PTV Pros 3625 6X 725 5 / 5 0 3,625 2/6/2024 2/16/2024 10       CONCURRENT THERAPY: None    TREATMENT COMPLETED:  Treatment completed as planned      CLINICAL COMMENTS:   Overall tolerated treatment well.  Tamsulosin prescribed in anticipation of urinary side-effects.       FOLLOW UP:   See Dr. Mc as scheduled. Return to this clinic 3 months later with PSA, or sooner as needed.       [facility]  [facility]  [facility], [address] [address]  P: [phone]  [facility]  [id number] [place] [id number]  P: [phone]  [facility] [address], [place] [address]  P: [phone]

## 2024-04-22 ENCOUNTER — OFFICE VISIT (OUTPATIENT)
Facility: CLINIC | Age: 79
End: 2024-04-22
Payer: MEDICARE

## 2024-04-22 VITALS
SYSTOLIC BLOOD PRESSURE: 138 MMHG | DIASTOLIC BLOOD PRESSURE: 80 MMHG | WEIGHT: 177 LBS | OXYGEN SATURATION: 97 % | HEIGHT: 67 IN | HEART RATE: 84 BPM | TEMPERATURE: 96.8 F | BODY MASS INDEX: 27.78 KG/M2

## 2024-04-22 DIAGNOSIS — I10 ESSENTIAL (PRIMARY) HYPERTENSION: ICD-10-CM

## 2024-04-22 DIAGNOSIS — E11.65 TYPE 2 DIABETES MELLITUS WITH HYPERGLYCEMIA, WITHOUT LONG-TERM CURRENT USE OF INSULIN (HCC): ICD-10-CM

## 2024-04-22 DIAGNOSIS — Z00.00 MEDICARE ANNUAL WELLNESS VISIT, SUBSEQUENT: Primary | ICD-10-CM

## 2024-04-22 DIAGNOSIS — C61 PROSTATE CANCER (HCC): ICD-10-CM

## 2024-04-22 DIAGNOSIS — E78.2 MIXED HYPERLIPIDEMIA: ICD-10-CM

## 2024-04-22 DIAGNOSIS — Z11.59 NEED FOR HEPATITIS C SCREENING TEST: ICD-10-CM

## 2024-04-22 PROCEDURE — G0439 PPPS, SUBSEQ VISIT: HCPCS | Performed by: FAMILY MEDICINE

## 2024-04-22 PROCEDURE — 99214 OFFICE O/P EST MOD 30 MIN: CPT | Performed by: FAMILY MEDICINE

## 2024-04-22 PROCEDURE — 3052F HG A1C>EQUAL 8.0%<EQUAL 9.0%: CPT | Performed by: FAMILY MEDICINE

## 2024-04-22 PROCEDURE — 3075F SYST BP GE 130 - 139MM HG: CPT | Performed by: FAMILY MEDICINE

## 2024-04-22 PROCEDURE — G8419 CALC BMI OUT NRM PARAM NOF/U: HCPCS | Performed by: FAMILY MEDICINE

## 2024-04-22 PROCEDURE — G8427 DOCREV CUR MEDS BY ELIG CLIN: HCPCS | Performed by: FAMILY MEDICINE

## 2024-04-22 PROCEDURE — 3079F DIAST BP 80-89 MM HG: CPT | Performed by: FAMILY MEDICINE

## 2024-04-22 PROCEDURE — 1036F TOBACCO NON-USER: CPT | Performed by: FAMILY MEDICINE

## 2024-04-22 PROCEDURE — 1123F ACP DISCUSS/DSCN MKR DOCD: CPT | Performed by: FAMILY MEDICINE

## 2024-04-22 RX ORDER — INSULIN GLARGINE 100 [IU]/ML
INJECTION, SOLUTION SUBCUTANEOUS
Qty: 18 ML | Refills: 0 | Status: SHIPPED
Start: 2024-04-22

## 2024-04-22 ASSESSMENT — PATIENT HEALTH QUESTIONNAIRE - PHQ9
SUM OF ALL RESPONSES TO PHQ QUESTIONS 1-9: 0
2. FEELING DOWN, DEPRESSED OR HOPELESS: NOT AT ALL
SUM OF ALL RESPONSES TO PHQ QUESTIONS 1-9: 0
SUM OF ALL RESPONSES TO PHQ QUESTIONS 1-9: 0
1. LITTLE INTEREST OR PLEASURE IN DOING THINGS: NOT AT ALL
SUM OF ALL RESPONSES TO PHQ QUESTIONS 1-9: 0
SUM OF ALL RESPONSES TO PHQ9 QUESTIONS 1 & 2: 0

## 2024-04-22 ASSESSMENT — LIFESTYLE VARIABLES
HOW OFTEN DO YOU HAVE A DRINK CONTAINING ALCOHOL: MONTHLY OR LESS
HOW MANY STANDARD DRINKS CONTAINING ALCOHOL DO YOU HAVE ON A TYPICAL DAY: 1 OR 2

## 2024-04-22 NOTE — PROGRESS NOTES
Medicare Annual Wellness Visit    Donnell D Form is here for Follow-up (Chronic conditions ) and Medicare AWV    Assessment & Plan   Medicare annual wellness visit, subsequent  Type 2 diabetes mellitus with hyperglycemia, without long-term current use of insulin (HCC)  -     insulin glargine (LANTUS SOLOSTAR) 100 UNIT/ML injection pen; Take 17 units at  bed time, Disp-18 mL, R-03   months supplyAdjust Sig  Encounter for long-term (current) use of insulin (HCC)  -     insulin glargine (LANTUS SOLOSTAR) 100 UNIT/ML injection pen; Take 17 units at  bed time, Disp-18 mL, R-03   months supplyAdjust Sig  Essential (primary) hypertension  -     insulin glargine (LANTUS SOLOSTAR) 100 UNIT/ML injection pen; Take 17 units at  bed time, Disp-18 mL, R-03   months supplyAdjust Sig  Mixed hyperlipidemia  -     insulin glargine (LANTUS SOLOSTAR) 100 UNIT/ML injection pen; Take 17 units at  bed time, Disp-18 mL, R-03   months supplyAdjust Sig  Need for hepatitis C screening test  -     Hepatitis C Antibody; Future    Recommendations for Preventive Services Due: see orders and patient instructions/AVS.  Recommended screening schedule for the next 5-10 years is provided to the patient in written form: see Patient Instructions/AVS.     Return in about 1 year (around 4/22/2025) for MWV.     Subjective     Patient's complete Health Risk Assessment and screening values have been reviewed and are found in Flowsheets. The following problems were reviewed today and where indicated follow up appointments were made and/or referrals ordered.    Positive Risk Factor Screenings with Interventions:                Activity, Diet, and Weight:  On average, how many days per week do you engage in moderate to strenuous exercise (like a brisk walk)?: 0 days  On average, how many minutes do you engage in exercise at this level?: 0 min    Do you eat balanced/healthy meals regularly?: Yes    Body mass index is 27.72 kg/m².      Inactivity

## 2024-04-22 NOTE — PATIENT INSTRUCTIONS
Learning About Being Active as an Older Adult  Why is being active important as you get older?     Being active is one of the best things you can do for your health. And it's never too late to start. Being active--or getting active, if you aren't already--has definite benefits. It can:  Give you more energy,  Keep your mind sharp.  Improve balance to reduce your risk of falls.  Help you manage chronic illness with fewer medicines.  No matter how old you are, how fit you are, or what health problems you have, there is a form of activity that will work for you. And the more physical activity you can do, the better your overall health will be.  What kinds of activity can help you stay healthy?  Being more active will make your daily activities easier. Physical activity includes planned exercise and things you do in daily life. There are four types of activity:  Aerobic.  Doing aerobic activity makes your heart and lungs strong.  Includes walking, dancing, and gardening.  Aim for at least 2½ hours spread throughout the week.  It improves your energy and can help you sleep better.  Muscle-strengthening.  This type of activity can help maintain muscle and strengthen bones.  Includes climbing stairs, using resistance bands, and lifting or carrying heavy loads.  Aim for at least twice a week.  It can help protect the knees and other joints.  Stretching.  Stretching gives you better range of motion in joints and muscles.  Includes upper arm stretches, calf stretches, and gentle yoga.  Aim for at least twice a week, preferably after your muscles are warmed up from other activities.  It can help you function better in daily life.  Balancing.  This helps you stay coordinated and have good posture.  Includes heel-to-toe walking, soraida chi, and certain types of yoga.  Aim for at least 3 days a week.  It can reduce your risk of falling.  Even if you have a hard time meeting the recommendations, it's better to be more active

## 2024-04-22 NOTE — PROGRESS NOTES
[facility]      HPI: Pt is a 78 y.o. male who presents for follow-up.     DM: Follows with Endocrine, last A1c 8.5 in 2/2024. He started Lantus (now on 17U) and now has a FreeStyle [place]coe. Also taking Prandin, Januvia, Actos and metformin. He has his log today and the average has been running in the 's for the past 30 days.     Foot swelling: He has noticed this, L>R, for a while. He denies CP, SOB, PND and orthopnea.     HLD: Takes pravastatin without concerns.     Prostate cancer: He recently underwent radiation therapy with [facility] and has a follow-up appt to go back and have PSA checked in a few months.       Past Medical History:   Diagnosis Date    Allergies     Arthritis     DM (diabetes mellitus) (HCC)     Hearing loss 1/1/13    Actual date unknown. Have hearing aids.    Hyperlipidemia     Prostate cancer (HCC)     Sciatica        Family History   Problem Relation Age of Onset    Diabetes Mother     Alzheimer's Disease Father     Cancer Father         Lung cancer    Prostate Cancer Father     Diabetes Brother         Prostate cancer    Diabetes Maternal Grandmother     Diabetes Maternal Grandfather        Social History     Tobacco Use    Smoking status: Never    Smokeless tobacco: Never   Substance Use Topics    Alcohol use: Yes     Comment: rarely    Drug use: Never       ROS:  Per HPI    PE:  /80 (Site: Left Upper Arm, Position: Sitting, Cuff Size: Large Adult)   Pulse 84   Temp 96.8 °F (36 °C) (Temporal)   Ht 1.702 m (5' 7\")   Wt 80.3 kg (177 lb)   SpO2 97%   BMI 27.72 kg/m²   Gen: Pt sitting in chair, in NAD  Head: Normocephalic, atraumatic  Eyes: Sclera anicteric, EOM grossly intact, PERRL  Nose: Normal nasal mucosa  Throat: MMM, normal lips, tongue, teeth and gums  Neck: Supple  CVS: Normal S1, S2, no m/r/g  Resp: CTAB, no wheezes or rales  Extrem: Atraumatic, no cyanosis. Trace edema to R ankle. 1+ edema to L ankle.   Pulses: 2+   Skin: Warm, dry  Neuro: Alert,

## 2024-04-22 NOTE — PROGRESS NOTES
Chief Complaint   Patient presents with    Follow-up     Chronic conditions      /80 (Site: Left Upper Arm, Position: Sitting, Cuff Size: Large Adult)   Pulse 84   Temp 96.8 °F (36 °C) (Temporal)   Ht 1.702 m (5' 7\")   Wt 80.3 kg (177 lb)   SpO2 97%   BMI 27.72 kg/m²       \"Have you been to the ER, urgent care clinic since your last visit?  Hospitalized since your last visit?\"    NO    “Have you seen or consulted any other health care providers outside of [facility] since your last visit?”    NO          Click Here for Release of Records Request   PHQ-9 Total Score: 0 (4/22/2024  7:28 AM)

## 2024-07-12 DIAGNOSIS — E11.65 TYPE 2 DIABETES MELLITUS WITH HYPERGLYCEMIA, WITHOUT LONG-TERM CURRENT USE OF INSULIN (HCC): Primary | ICD-10-CM

## 2024-07-12 DIAGNOSIS — Z79.4 ENCOUNTER FOR LONG-TERM (CURRENT) USE OF INSULIN (HCC): ICD-10-CM

## 2024-07-12 RX ORDER — SITAGLIPTIN 100 MG/1
100 TABLET, FILM COATED ORAL DAILY
Qty: 90 TABLET | Refills: 3 | Status: SHIPPED | OUTPATIENT
Start: 2024-07-12

## 2024-07-16 ENCOUNTER — LAB (OUTPATIENT)
Age: 79
End: 2024-07-16

## 2024-07-16 DIAGNOSIS — Z79.4 ENCOUNTER FOR LONG-TERM (CURRENT) USE OF INSULIN (HCC): ICD-10-CM

## 2024-07-16 DIAGNOSIS — E11.65 TYPE 2 DIABETES MELLITUS WITH HYPERGLYCEMIA, WITHOUT LONG-TERM CURRENT USE OF INSULIN (HCC): ICD-10-CM

## 2024-07-17 LAB
ALBUMIN SERPL-MCNC: 3.9 G/DL (ref 3.5–5)
ALBUMIN/GLOB SERPL: 1 (ref 1.1–2.2)
ALP SERPL-CCNC: 64 U/L (ref 45–117)
ALT SERPL-CCNC: 24 U/L (ref 12–78)
ANION GAP SERPL CALC-SCNC: 5 MMOL/L (ref 5–15)
AST SERPL-CCNC: 18 U/L (ref 15–37)
BILIRUB SERPL-MCNC: 0.7 MG/DL (ref 0.2–1)
BUN SERPL-MCNC: 22 MG/DL (ref 6–20)
BUN/CREAT SERPL: 21 (ref 12–20)
CALCIUM SERPL-MCNC: 9.9 MG/DL (ref 8.5–10.1)
CHLORIDE SERPL-SCNC: 106 MMOL/L (ref 97–108)
CHOLEST SERPL-MCNC: 133 MG/DL
CO2 SERPL-SCNC: 28 MMOL/L (ref 21–32)
CREAT SERPL-MCNC: 1.05 MG/DL (ref 0.7–1.3)
CREAT UR-MCNC: 295 MG/DL
EST. AVERAGE GLUCOSE BLD GHB EST-MCNC: 131 MG/DL
GLOBULIN SER CALC-MCNC: 3.8 G/DL (ref 2–4)
GLUCOSE SERPL-MCNC: 77 MG/DL (ref 65–100)
HBA1C MFR BLD: 6.2 % (ref 4–5.6)
HDLC SERPL-MCNC: 52 MG/DL
HDLC SERPL: 2.6 (ref 0–5)
LDLC SERPL CALC-MCNC: 69.4 MG/DL (ref 0–100)
MICROALBUMIN UR-MCNC: 8.7 MG/DL
MICROALBUMIN/CREAT UR-RTO: 29 MG/G (ref 0–30)
POTASSIUM SERPL-SCNC: 4.2 MMOL/L (ref 3.5–5.1)
PROT SERPL-MCNC: 7.7 G/DL (ref 6.4–8.2)
SODIUM SERPL-SCNC: 139 MMOL/L (ref 136–145)
TRIGL SERPL-MCNC: 58 MG/DL
VLDLC SERPL CALC-MCNC: 11.6 MG/DL

## 2024-07-19 ENCOUNTER — OFFICE VISIT (OUTPATIENT)
Age: 79
End: 2024-07-19

## 2024-07-19 VITALS
HEART RATE: 73 BPM | BODY MASS INDEX: 26.85 KG/M2 | DIASTOLIC BLOOD PRESSURE: 71 MMHG | SYSTOLIC BLOOD PRESSURE: 141 MMHG | OXYGEN SATURATION: 96 % | WEIGHT: 171.4 LBS | TEMPERATURE: 97.9 F

## 2024-07-19 DIAGNOSIS — E78.2 MIXED HYPERLIPIDEMIA: ICD-10-CM

## 2024-07-19 DIAGNOSIS — I10 ESSENTIAL (PRIMARY) HYPERTENSION: ICD-10-CM

## 2024-07-19 DIAGNOSIS — E11.65 TYPE 2 DIABETES MELLITUS WITH HYPERGLYCEMIA, WITHOUT LONG-TERM CURRENT USE OF INSULIN (HCC): Primary | ICD-10-CM

## 2024-07-19 DIAGNOSIS — Z79.4 ENCOUNTER FOR LONG-TERM (CURRENT) USE OF INSULIN (HCC): ICD-10-CM

## 2024-07-19 NOTE — PATIENT INSTRUCTIONS
SPECIFIC INSTRUCTIONS BELOW       Stay  on pravachol 20 mg at night       Stay  on  metformin  500 mg to 2 pills  twice a day with meals      Stay on  januvia 100 mg a day  ( pt assistance )      STOP   actos       Decrease  lantus    at  14    units  at  bed time    (   20  units at  night  @ pharmacy  )     Be aware of  false  lows  by  IOANA        Less than 70 mg - no insulin       Stay on  repaglinide   2 mg  right before each meal   Do not take it if you are not eating   Cut the pill to half if eating lesser than normal   Do not avoid lunches              -------------PAY ATTENTION TO THESE GENERAL INSTRUCTIONS -----------------      - The medications prescribed at this visit will not be available at pharmacy until 6 pm       - YOUR MED LIST IS NOT UP TO DATE AS SOME CHANGES ARE BEING MADE AFTER THE VISIT - FOLLOW SPECIFIC INSTRUCTIONS  ABOVE     -ANY tests other than blood work, which you opt to do  outside the  [facility] [place] [facility] imaging facilities, you are responsible for prior authorizations if  required    - HEALTH MAINTENANCE IS NOT GOING TO BE UP TO DATE ON YOUR AVS- PLEASE IGNORE     Results     *Normal results will not be notified by a phone call starting January 1 2021   *If you have an upcoming visit, the results will be discussed at the visit   *Please sign up for MY CHART if you want access to your lab and test results  *Abnormal results which require immediate attention will be notified by phone call   *Abnormal results which do not require immediate assistance will be notified in 1-2 weeks       Refills    -    have your pharmacy send us a refill request . Refills are done max for one year and a visit is a must before refills are extended    Follow up appointments -  highly encourage you to make it when you are checking out. We can accommodate you into the schedule based on your clinical situation, but not for extending refills beyond a year. Labs are important to give refills and is important to get

## 2024-07-19 NOTE — PROGRESS NOTES
%      April 2024    Not due for A!C.   Noted some more low sugars by [facility]SB[facility] and on tayler   Asked him to cross check still   Discussed false lows by tayler . Lowered the dose of lantus insulin.   Discussed the possibility of mis communication which can potentially occur with false high doses  of  insulin   Wrote for 3 months supply of lantus this time , he will stay on actos , prandin, januvia and metformin   He also mentions that januvia was not sent as year long script to pt assistance  Tami    Reviewed   [employer] [facility]  report for 14 days and discussed with pt    - 14 day average glucose 127  -GMI 6.3 %  -1%  very high   15 % for high and 10 %    low sugars and % in Target  Range  69%    - percent of utiization 98.5 %  - CV% 40 %        2. Hypoglycemia :  Educated on treating the hypoglycemia.         3. HTN : controlled,  not on any ACEI or ARB, on and off  he has proteinuria .        4.  Dyslipidemia : lipids are good ,  on pravachol 20 mg at night             5. 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 Moore         6. Elevated PSA - f/up  with urology  regularly          7. Eye visit he does follow up regularly - no retinopathy                Reviewed results with patient and discussed the labs being ordered today/bnv   Patient voiced understanding of plan of care

## 2024-08-05 DIAGNOSIS — C61 PROSTATE CANCER (HCC): Primary | ICD-10-CM

## 2024-08-23 LAB — HCV IGG SERPL QL IA: NON REACTIVE

## 2024-08-26 ENCOUNTER — HOSPITAL ENCOUNTER (OUTPATIENT)
Facility: HOSPITAL | Age: 79
Discharge: HOME | End: 2024-08-29
Attending: RADIOLOGY

## 2024-08-26 VITALS
HEIGHT: 67 IN | SYSTOLIC BLOOD PRESSURE: 166 MMHG | HEART RATE: 64 BPM | BODY MASS INDEX: 26.84 KG/M2 | DIASTOLIC BLOOD PRESSURE: 77 MMHG | RESPIRATION RATE: 16 BRPM | WEIGHT: 171 LBS

## 2024-08-26 DIAGNOSIS — C61 PROSTATE CANCER (HCC): Primary | ICD-10-CM

## 2024-08-26 NOTE — CONSULTS
RADIATION ONCOLOGY PROGRESS NOTE    Patient Name: Donnell Moreno  Patient YOB: 1945   Medical Record Number: [id number]  Referring Physician: Joshua Mc MD  [id number] [id number]  Primary Care Provider: Caryn Huynh MD    DIAGNOSIS & STAGING: Cancer Staging   No matching staging information was found for the patient.      ICD-10-CM    1. Prostate cancer (HCC)  C61         AJCC Staging has been reviewed      CHIEF COMPLAINT: Unfavorable intermediate risk prostate cancer, [place] 4 + 3, PSA 10.99 ng/mL, T1c. Declined ADT against medical advice. Definitive radiation with prostate SBRT, 7.25 Gy x 5, with CTV boosted to 8 Gy x 5, and MRI/PSMA lesion microboost 9 Gy x 5, completed 2/16/2024.  Diagnosed in 2015 with very low risk prostate cancer and managed initially with active surveillance.    RADIATION HISTORY:  Course: C1     Treatment Site Ref. ID Energy Dose/Fx (cGy) #Fx Dose Correction (cGy) Total Dose (cGy) Start Date End Date Elapsed Days   SBRT p/SV    [id number] PTV Pros 3625 6X 725 5 / 5 0 3,625 2/6/2024 2/16/2024 10       RETURN VISITS:      8/26/24: Today is approximately 6 months after the completion of radiation. His PSA is 2.260 ng/mL from 8/22/2024. He denies blood in urine or stool. His urinary symptoms are at his pre-RT baseline he states. He says he took tamsulosin for less than a week. IPSS = 10, QOL = 0/delighted. GRACIE = 1. He denies GI complaints.   He next sees Dr Mc 11/22/24.       HISTORY OF PRESENT ILLNESS AT CONSULT:      Mr. Moreno is a 77-year old man with DM on po agents, sciatica, elevated cholesterol, hemorrhoid surgery, and prostate cancer.    Pre-diagnosis, his PSA was 4.8 ng/mL from 8/11/2015.    He was diagnosed with prostate cancer by needle biopsy 9/9/15. Pathology demonstrated cancer in 1/12 template cores with [place] 3 + 3 in <5% of cores. PSA density = 0.12 ng/mL/cc.   He had a cancer talk with Dr. Mc and was initially managed

## 2024-09-23 RX ORDER — PRAVASTATIN SODIUM 20 MG
TABLET ORAL
Qty: 90 TABLET | Refills: 3 | Status: SHIPPED | OUTPATIENT
Start: 2024-09-23

## 2024-09-25 ENCOUNTER — TELEPHONE (OUTPATIENT)
Facility: CLINIC | Age: 79
End: 2024-09-25

## 2024-11-18 ENCOUNTER — LAB (OUTPATIENT)
Age: 79
End: 2024-11-18

## 2024-11-18 DIAGNOSIS — E78.2 MIXED HYPERLIPIDEMIA: ICD-10-CM

## 2024-11-18 DIAGNOSIS — E11.65 TYPE 2 DIABETES MELLITUS WITH HYPERGLYCEMIA, WITHOUT LONG-TERM CURRENT USE OF INSULIN (HCC): ICD-10-CM

## 2024-11-18 DIAGNOSIS — I10 ESSENTIAL (PRIMARY) HYPERTENSION: ICD-10-CM

## 2024-11-18 DIAGNOSIS — Z79.4 ENCOUNTER FOR LONG-TERM (CURRENT) USE OF INSULIN (HCC): ICD-10-CM

## 2024-11-19 LAB
ALBUMIN SERPL-MCNC: 3.8 G/DL (ref 3.5–5)
ALBUMIN/GLOB SERPL: 1.2 (ref 1.1–2.2)
ALP SERPL-CCNC: 77 U/L (ref 45–117)
ALT SERPL-CCNC: 18 U/L (ref 12–78)
ANION GAP SERPL CALC-SCNC: 6 MMOL/L (ref 2–12)
AST SERPL-CCNC: 18 U/L (ref 15–37)
BILIRUB SERPL-MCNC: 1 MG/DL (ref 0.2–1)
BUN SERPL-MCNC: 19 MG/DL (ref 6–20)
BUN/CREAT SERPL: 18 (ref 12–20)
CALCIUM SERPL-MCNC: 8.8 MG/DL (ref 8.5–10.1)
CHLORIDE SERPL-SCNC: 108 MMOL/L (ref 97–108)
CHOLEST SERPL-MCNC: 122 MG/DL
CO2 SERPL-SCNC: 30 MMOL/L (ref 21–32)
CREAT SERPL-MCNC: 1.08 MG/DL (ref 0.7–1.3)
CREAT UR-MCNC: 220 MG/DL
EST. AVERAGE GLUCOSE BLD GHB EST-MCNC: 169 MG/DL
GLOBULIN SER CALC-MCNC: 3.3 G/DL (ref 2–4)
GLUCOSE SERPL-MCNC: 113 MG/DL (ref 65–100)
HBA1C MFR BLD: 7.5 % (ref 4–5.6)
HDLC SERPL-MCNC: 49 MG/DL
HDLC SERPL: 2.5 (ref 0–5)
LDLC SERPL CALC-MCNC: 58.8 MG/DL (ref 0–100)
MICROALBUMIN UR-MCNC: 7.62 MG/DL
MICROALBUMIN/CREAT UR-RTO: 35 MG/G (ref 0–30)
POTASSIUM SERPL-SCNC: 4.7 MMOL/L (ref 3.5–5.1)
PROT SERPL-MCNC: 7.1 G/DL (ref 6.4–8.2)
SODIUM SERPL-SCNC: 144 MMOL/L (ref 136–145)
TRIGL SERPL-MCNC: 71 MG/DL
VLDLC SERPL CALC-MCNC: 14.2 MG/DL

## 2024-11-21 ENCOUNTER — OFFICE VISIT (OUTPATIENT)
Age: 79
End: 2024-11-21

## 2024-11-21 VITALS
SYSTOLIC BLOOD PRESSURE: 141 MMHG | HEIGHT: 67 IN | HEART RATE: 79 BPM | OXYGEN SATURATION: 100 % | BODY MASS INDEX: 27.06 KG/M2 | WEIGHT: 172.4 LBS | DIASTOLIC BLOOD PRESSURE: 59 MMHG | TEMPERATURE: 97.8 F

## 2024-11-21 DIAGNOSIS — Z79.4 ENCOUNTER FOR LONG-TERM (CURRENT) USE OF INSULIN (HCC): ICD-10-CM

## 2024-11-21 DIAGNOSIS — E78.2 MIXED HYPERLIPIDEMIA: ICD-10-CM

## 2024-11-21 DIAGNOSIS — E11.65 TYPE 2 DIABETES MELLITUS WITH HYPERGLYCEMIA, WITHOUT LONG-TERM CURRENT USE OF INSULIN (HCC): Primary | ICD-10-CM

## 2024-11-21 DIAGNOSIS — I10 ESSENTIAL (PRIMARY) HYPERTENSION: ICD-10-CM

## 2024-11-21 NOTE — PATIENT INSTRUCTIONS
SPECIFIC INSTRUCTIONS BELOW         DRINK water   Do not skip meals  Do not eat in between meals    Reduce carbs- pasta, rice, potatoes, bread   Try to avoid processed bread products like BISCUITS, CROISSANTS, MUFFINS    Do not drink juices or sodas, even if they are calorie zero or diet drinks and especially avoid using powders like crystalloids , ROSANNA-AIDS     Do not eat peanut butter     Do not eat sugar free cookies and cakes   Do not eat peaches, oranges, pineapples, raisins, grapes , canteloupe , honey dew, mangoes , watermelon  and fruit medleys                ------------------------------------------      Stay  on pravachol 20 mg at night       Stay  on  metformin  500 mg to 2 pills  twice a day with meals      Stay on  januvia 100 mg a day  ( pt assistance )      Start on jardiance  10 mg a day   before b-fast     STOP   actos        lantus    at  14    units  at  bed time    (   20  units at  night  @ pharmacy  )     Be aware of  false  lows  by  IOANA        Less than 70 mg - no insulin       Stay on  repaglinide   2 mg  right before each meal   Do not take it if you are not eating   Cut the pill to half if eating lesser than normal   Do not avoid lunches              -------------PAY ATTENTION TO THESE GENERAL INSTRUCTIONS -----------------      - The medications prescribed at this visit will not be available at pharmacy until 6 pm       - YOUR MED LIST IS NOT UP TO DATE AS SOME CHANGES ARE BEING MADE AFTER THE VISIT - FOLLOW SPECIFIC INSTRUCTIONS  ABOVE     -ANY tests other than blood work, which you opt to do  outside the  [facility] imaging facilities, you are responsible for prior authorizations if  required    - HEALTH MAINTENANCE IS NOT GOING TO BE UP TO DATE ON YOUR AVS- PLEASE IGNORE     Results     *Normal results will not be notified by a phone call starting January 1 2021   *If you have an upcoming visit, the results will be discussed at the visit   *Please sign up for MY CHART if you want

## 2024-11-21 NOTE — PROGRESS NOTES
Donnell Moreno is a 79 y.o. male here for   Chief Complaint   Patient presents with    Diabetes    Hyperlipdemia       1. Have you been to the ER, urgent care clinic since your last visit?  Hospitalized since your last visit? -No    2. Have you seen or consulted any other health care providers outside of the [facility] since your last visit?  Include any pap smears or colon screening.-No

## 2024-11-21 NOTE — PROGRESS NOTES
[facility]                Evelin Underwood MD FACE       HISTORY OF PRESENT ILLNESS   Donnell Moreno is a 79   y.o.  male.   HPI   Patient here for f/u   After  visit of  Type 2 diabetes mellitus from  August 2024     He is now looking for  new plans during open enrollment period   Gained a lb   He is afraid of cost  on basal insulin  He is receiving 2 pens  but paying 35$  a month        July 2024      He is having low sugars ( twice last week )   He is using IOANA   He understood the discrepancy   He is feeling symptoms of low sugars      April 2024     He still continued to have fasting low sugars despite decreasing Lantus  He got the Ioana  Pharmacy hinted him to ask me for higher dose to save a co-pay       March 2024      His low sugars began   on feb 17th , below 70 mg   He has had low sugars every day  He increased lantus to  42  units a day   He is now experiencing Tremors - 199 mg         Feb 2024      Lost 2 lbs   Diagnosed  with prostrate cancer ,  s/p brachy therapy  -    Now feeling dizzy   from use of flomax    His sugars are very high    Log reviewed and he expresses interest   in starting insulin       August 2023     Lost 6 lbs   He had severe  pain issues, spine    ? Pyriformis syndrome   He finally saw PT  and had stretches  and that relieved  his pain       Feb 2023       No meter    He did not walk he says since July 2022, after wife had knee surgery    Gained 9 lbs ( winter wear )          Referred : by pcp      H/o diabetes for 10  years    Current A1C is 8.7 %   From   March 8 2017  and symptoms/problems include fluctuant sugars  and polyuria    Current diabetic medications include glipizide xr 2.5 mg a day and metformin.    Cardiovascular risk factors: dyslipidemia, diabetes mellitus, obesity, male gender, hypertension, stress         Review of Systems    Constitutional: Negative.     Neurological: Negative.         Physical Exam    Constitutional: He is

## 2024-11-23 RX ORDER — INSULIN GLARGINE 100 [IU]/ML
INJECTION, SOLUTION SUBCUTANEOUS
Qty: 18 ML | Refills: 1 | Status: SHIPPED | OUTPATIENT
Start: 2024-11-23

## 2024-12-02 ENCOUNTER — TELEPHONE (OUTPATIENT)
Age: 79
End: 2024-12-02

## 2024-12-02 NOTE — TELEPHONE ENCOUNTER
Additional Anesthesia Volume In Cc: 0 Patient dropped off information on insulin pricing as he is in [facility] stating Dr. Underwood will need to work out filling as much as possible all at once as his price is set no matter how much he gets see last keisha of [place] script information.     Patient also dropped off [employer] patient assistance form he will pick both papers up together once complete please contact him. Put into one envelope with his name and  and filed in nursing  folder    Use Map Statement For Sites (Optional): No Additional Area 1 Location: temples and lateral upper face Number Of Syringes (Required For Inventory): 1 Post-Care Instructions: Patient instructed to apply ice to reduce swelling. Call office with any concerns. Anesthesia Volume In Cc: 0.3 Include Cannula Length?: 1.5 inch Lot #: [id number] Additional Area 2 Location: top of hands Price (Use Numbers Only, No Special Characters Or $): 858 Filler: Ashley Mccurdy Map Statement: See Attach Map for Complete Details Consent: Written consent obtained. Risks include but not limited to bruising, beading, irregular texture, ulceration, infection, allergic reaction, scar formation, incomplete augmentation, temporary nature, procedural pain. Anesthesia Type: 1% lidocaine with epinephrine Procedural Text: The filler was administered to the treatment areas noted above. Cannula was used in lateral face. Detail Level: Detailed Expiration Date (Month Year): 5/25 Include Cannula Size?: 25G

## 2024-12-03 ENCOUNTER — TELEPHONE (OUTPATIENT)
Age: 79
End: 2024-12-03

## 2024-12-03 NOTE — TELEPHONE ENCOUNTER
Informed pt that the form is at the  for him to . He also wanted me to show Dr. Underwood his drug med coverage, put in her folder on her desk to  to review

## 2024-12-09 ENCOUNTER — TELEPHONE (OUTPATIENT)
Age: 79
End: 2024-12-09

## 2024-12-09 NOTE — TELEPHONE ENCOUNTER
Informed pt that Dr. Underwood would do what ever is ethically possible for the prescriptions. He understood. He eva that his pharmacist told him that she could write on the prescription up to 50 units a day, if he is only taking 14 units. I informed him I would pass this message on. He said anything you can do would be appreciated.

## 2024-12-20 DIAGNOSIS — E11.65 TYPE 2 DIABETES MELLITUS WITH HYPERGLYCEMIA, WITHOUT LONG-TERM CURRENT USE OF INSULIN (HCC): ICD-10-CM

## 2025-01-12 ENCOUNTER — PATIENT MESSAGE (OUTPATIENT)
Age: 80
End: 2025-01-12

## 2025-03-03 ENCOUNTER — TRANSCRIBE ORDERS (OUTPATIENT)
Facility: HOSPITAL | Age: 80
End: 2025-03-03

## 2025-03-03 DIAGNOSIS — Z79.4 ENCOUNTER FOR LONG-TERM (CURRENT) USE OF INSULIN (HCC): ICD-10-CM

## 2025-03-03 DIAGNOSIS — M51.16 LUMBAR DISC DISEASE WITH RADICULOPATHY: Primary | ICD-10-CM

## 2025-03-03 DIAGNOSIS — E11.65 TYPE 2 DIABETES MELLITUS WITH HYPERGLYCEMIA, WITHOUT LONG-TERM CURRENT USE OF INSULIN (HCC): ICD-10-CM

## 2025-03-04 RX ORDER — PEN NEEDLE, DIABETIC 32GX 5/32"
NEEDLE, DISPOSABLE MISCELLANEOUS
Qty: 100 EACH | Refills: 3 | Status: SHIPPED | OUTPATIENT
Start: 2025-03-04

## 2025-03-06 ENCOUNTER — HOSPITAL ENCOUNTER (OUTPATIENT)
Facility: HOSPITAL | Age: 80
Setting detail: RECURRING SERIES
Discharge: HOME | End: 2025-03-09
Payer: MEDICARE

## 2025-03-06 ENCOUNTER — HOSPITAL ENCOUNTER (OUTPATIENT)
Facility: HOSPITAL | Age: 80
Discharge: HOME | End: 2025-03-09
Payer: MEDICARE

## 2025-03-06 DIAGNOSIS — M51.16 LUMBAR DISC DISEASE WITH RADICULOPATHY: ICD-10-CM

## 2025-03-06 PROCEDURE — 97161 PT EVAL LOW COMPLEX 20 MIN: CPT

## 2025-03-06 PROCEDURE — 72148 MRI LUMBAR SPINE W/O DYE: CPT

## 2025-03-06 NOTE — THERAPY EVALUATION
[facility]  [address], [address]  [place] [address]  Phone: [phone]    Fax: [phone]         PHYSICAL THERAPY - MEDICARE EVALUATION/PLAN OF CARE NOTE (updated 3/23)      Date: 3/6/2025          Patient Name:  Donnell Moreno :  1945   Medical   Diagnosis:  Other intervertebral disc degeneration, lumbar region with discogenic back pain and lower extremity pain [M51.362] Treatment Diagnosis:  M54.31  SCIATICA, RIGHT SIDE    Referral Source:  Oneida Peña PA* Provider #:  [id number]                Insurance: Payor: MEDICARE / Plan: MEDICARE PART A AND B / Product Type: *No Product type* /      Patient  verified yes     Visit #   Current  / Total 1 24   Time   In / Out 1030 1115   Total Treatment Time 45   Total Timed Codes 0   1:1 Treatment Time 45      [facility] Totals Reminder:  bill using total billable   min of TIMED therapeutic procedures and modalities.   8-22 min = 1 unit; 23-37 min = 2 units; 38-52 min = 3 units;  53-67 min = 4 units; 68-82 min = 5 units           SUBJECTIVE  Pain Level (0-10 scale): 5  []constant []intermittent []improving []worsening []no change since onset    Any medication changes, allergies to medications, adverse drug reactions, diagnosis change, or new procedure performed?: [x] No    [] Yes (see summary sheet for update)  Medications: Verified on Patient Summary List    Subjective functional status/changes:     Sciatica for years.  Had PT here 2 yr ago; April the PT and/or steroid injection took sx away.  Now sx worse x3 wk so restarting PT.  To get injection 3/17/25  No exercise routine these days.  Do a lot of quilting when I can tolerate it, but right now I am not tolerating it.  Pain starts in low back and goes on down left leg into toes, on/off.    Start of Care: 3/6/2025  Onset Date: years  Current symptoms/Complaints: as above  Mechanism of Injury: insidious  PLOF: wfl  Limitations to PLOF/Activity

## 2025-03-12 ENCOUNTER — HOSPITAL ENCOUNTER (OUTPATIENT)
Facility: HOSPITAL | Age: 80
Setting detail: RECURRING SERIES
Discharge: HOME | End: 2025-03-15
Payer: MEDICARE

## 2025-03-12 PROCEDURE — 97110 THERAPEUTIC EXERCISES: CPT

## 2025-03-12 NOTE — PROGRESS NOTES
PHYSICAL THERAPY - MEDICARE DAILY TREATMENT NOTE (updated 3/23)      Date: 3/12/2025          Patient Name:  Donnell Moreno :  1945   Medical   Diagnosis:  Other intervertebral disc degeneration, lumbar region with discogenic back pain and lower extremity pain [M51.362] Treatment Diagnosis:  M54.31  SCIATICA, RIGHT SIDE    Referral Source:  Oneida Peña PA* Insurance:   Payor: MEDICARE / Plan: MEDICARE PART A AND B / Product Type: *No Product type* /                     Patient  verified yes     Visit #   Current  / Total 2 24   Time   In / Out 145 230   Total Treatment Time 45   Total Timed Codes 40   1:1 Treatment Time 45      [facility] Totals Reminder:  bill using total billable   min of TIMED therapeutic procedures and modalities.   8-22 min = 1 unit; 23-37 min = 2 units; 38-52 min = 3 units; 53-67 min = 4 units; 68-82 min = 5 units            SUBJECTIVE    Pain Level (0-10 scale): 4    Any medication changes, allergies to medications, adverse drug reactions, diagnosis change, or new procedure performed?: [x] No    [] Yes (see summary sheet for update)  Medications: Verified on Patient Summary List    Subjective functional status/changes:     Notice some improvement.    OBJECTIVE      Therapeutic Procedures:  Tx Min Billable or 1:1 Min (if diff from Tx Min) Procedure, Rationale, Specifics   40  35605 Therapeutic Exercise (timed):  increase ROM, strength, coordination, balance, and proprioception to improve patient's ability to progress to PLOF and address remaining functional goals. (see flow sheet as applicable)     Details if applicable:       05944 Neuromuscular Re-Education (timed):  improve balance, coordination, kinesthetic sense, posture, core stability and proprioception to improve patient's ability to develop conscious control of individual muscles and awareness of position of extremities in order to progress to PLOF and address remaining functional goals. (see flow sheet as applicable)

## 2025-03-13 ENCOUNTER — LAB (OUTPATIENT)
Age: 80
End: 2025-03-13

## 2025-03-13 DIAGNOSIS — I10 ESSENTIAL (PRIMARY) HYPERTENSION: ICD-10-CM

## 2025-03-13 DIAGNOSIS — E11.65 TYPE 2 DIABETES MELLITUS WITH HYPERGLYCEMIA, WITHOUT LONG-TERM CURRENT USE OF INSULIN (HCC): ICD-10-CM

## 2025-03-13 DIAGNOSIS — E78.2 MIXED HYPERLIPIDEMIA: ICD-10-CM

## 2025-03-13 DIAGNOSIS — Z79.4 ENCOUNTER FOR LONG-TERM (CURRENT) USE OF INSULIN (HCC): ICD-10-CM

## 2025-03-14 ENCOUNTER — HOSPITAL ENCOUNTER (OUTPATIENT)
Facility: HOSPITAL | Age: 80
Setting detail: RECURRING SERIES
Discharge: HOME | End: 2025-03-17
Payer: MEDICARE

## 2025-03-14 DIAGNOSIS — Z79.4 ENCOUNTER FOR LONG-TERM (CURRENT) USE OF INSULIN (HCC): ICD-10-CM

## 2025-03-14 DIAGNOSIS — E11.65 TYPE 2 DIABETES MELLITUS WITH HYPERGLYCEMIA, WITHOUT LONG-TERM CURRENT USE OF INSULIN (HCC): Primary | ICD-10-CM

## 2025-03-14 LAB
ALBUMIN SERPL-MCNC: 3.7 G/DL (ref 3.5–5)
ALBUMIN/GLOB SERPL: 1.1 (ref 1.1–2.2)
ALP SERPL-CCNC: 69 U/L (ref 45–117)
ALT SERPL-CCNC: 16 U/L (ref 12–78)
ANION GAP SERPL CALC-SCNC: 2 MMOL/L (ref 2–12)
AST SERPL-CCNC: 16 U/L (ref 15–37)
BILIRUB SERPL-MCNC: 0.7 MG/DL (ref 0.2–1)
BUN SERPL-MCNC: 18 MG/DL (ref 6–20)
BUN/CREAT SERPL: 18 (ref 12–20)
CALCIUM SERPL-MCNC: 9.2 MG/DL (ref 8.5–10.1)
CHLORIDE SERPL-SCNC: 108 MMOL/L (ref 97–108)
CHOLEST SERPL-MCNC: 117 MG/DL
CO2 SERPL-SCNC: 29 MMOL/L (ref 21–32)
CREAT SERPL-MCNC: 0.98 MG/DL (ref 0.7–1.3)
CREAT UR-MCNC: 79.4 MG/DL
EST. AVERAGE GLUCOSE BLD GHB EST-MCNC: 157 MG/DL
GLOBULIN SER CALC-MCNC: 3.4 G/DL (ref 2–4)
GLUCOSE SERPL-MCNC: 91 MG/DL (ref 65–100)
HBA1C MFR BLD: 7.1 % (ref 4–5.6)
HDLC SERPL-MCNC: 47 MG/DL
HDLC SERPL: 2.5 (ref 0–5)
LDLC SERPL CALC-MCNC: 53.6 MG/DL (ref 0–100)
MICROALBUMIN UR-MCNC: 4.29 MG/DL
MICROALBUMIN/CREAT UR-RTO: 54 MG/G (ref 0–30)
POTASSIUM SERPL-SCNC: 4.4 MMOL/L (ref 3.5–5.1)
PROT SERPL-MCNC: 7.1 G/DL (ref 6.4–8.2)
SODIUM SERPL-SCNC: 139 MMOL/L (ref 136–145)
TRIGL SERPL-MCNC: 82 MG/DL
VLDLC SERPL CALC-MCNC: 16.4 MG/DL

## 2025-03-14 PROCEDURE — 97110 THERAPEUTIC EXERCISES: CPT

## 2025-03-14 RX ORDER — PEN NEEDLE, DIABETIC 32GX 5/32"
NEEDLE, DISPOSABLE MISCELLANEOUS
Qty: 200 EACH | Refills: 3 | Status: SHIPPED | OUTPATIENT
Start: 2025-03-14

## 2025-03-14 RX ORDER — INSULIN LISPRO 100 [IU]/ML
INJECTION, SOLUTION INTRAVENOUS; SUBCUTANEOUS
Qty: 15 ML | Refills: 3 | Status: SHIPPED | OUTPATIENT
Start: 2025-03-14

## 2025-03-14 NOTE — PROGRESS NOTES
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   8-22 min = 1 unit; 23-37 min = 2 units; 38-52 min = 3 units; 53-67 min = 4 units; 68-82 min = 5 units            SUBJECTIVE    Pain Level (0-10 scale): 2    Any medication changes, allergies to medications, adverse drug reactions, diagnosis change, or new procedure performed?: [x] No    [] Yes (see summary sheet for update)  Medications: Verified on Patient Summary List    Subjective functional status/changes:     \"Feeling a little tight, but not bad. Go for my first injection on Monday.\"    OBJECTIVE      Therapeutic Procedures:  Tx Min Billable or 1:1 Min (if diff from Tx Min) Procedure, Rationale, Specifics   39  93083 Therapeutic Exercise (timed):  increase ROM, strength, coordination, balance, and proprioception to improve patient's ability to progress to PLOF and address remaining functional goals. (see flow sheet as applicable)     Details if applicable:       71851 Neuromuscular Re-Education (timed):  improve balance, coordination, kinesthetic sense, posture, core stability and proprioception to improve patient's ability to develop conscious control of individual muscles and awareness of position of extremities in order to progress to PLOF and address remaining

## 2025-03-18 ENCOUNTER — HOSPITAL ENCOUNTER (OUTPATIENT)
Facility: HOSPITAL | Age: 80
Setting detail: RECURRING SERIES
Discharge: HOME | End: 2025-03-21
Payer: MEDICARE

## 2025-03-18 DIAGNOSIS — Z79.4 ENCOUNTER FOR LONG-TERM (CURRENT) USE OF INSULIN (HCC): ICD-10-CM

## 2025-03-18 DIAGNOSIS — E11.65 TYPE 2 DIABETES MELLITUS WITH HYPERGLYCEMIA, WITHOUT LONG-TERM CURRENT USE OF INSULIN (HCC): ICD-10-CM

## 2025-03-18 PROCEDURE — 97110 THERAPEUTIC EXERCISES: CPT

## 2025-03-18 RX ORDER — REPAGLINIDE 2 MG/1
TABLET ORAL
Qty: 270 TABLET | Refills: 3 | Status: SHIPPED | OUTPATIENT
Start: 2025-03-18

## 2025-03-18 NOTE — PROGRESS NOTES
quilting.        PLAN  Yes  Continue plan of care  Re-Cert Due: 6/6/25  [x]  Upgrade activities as tolerated  []  Discharge due to:  []  Other:      NYDIA LIRA JR, PTA       3/18/2025       10:36 AM

## 2025-03-20 ENCOUNTER — HOSPITAL ENCOUNTER (OUTPATIENT)
Facility: HOSPITAL | Age: 80
Setting detail: RECURRING SERIES
Discharge: HOME | End: 2025-03-23
Payer: MEDICARE

## 2025-03-20 ENCOUNTER — PATIENT MESSAGE (OUTPATIENT)
Age: 80
End: 2025-03-20

## 2025-03-20 ENCOUNTER — OFFICE VISIT (OUTPATIENT)
Age: 80
End: 2025-03-20
Payer: MEDICARE

## 2025-03-20 VITALS
SYSTOLIC BLOOD PRESSURE: 151 MMHG | WEIGHT: 173 LBS | HEART RATE: 67 BPM | TEMPERATURE: 97.7 F | BODY MASS INDEX: 27.15 KG/M2 | OXYGEN SATURATION: 95 % | DIASTOLIC BLOOD PRESSURE: 59 MMHG | HEIGHT: 67 IN

## 2025-03-20 DIAGNOSIS — E11.65 TYPE 2 DIABETES MELLITUS WITH HYPERGLYCEMIA, WITHOUT LONG-TERM CURRENT USE OF INSULIN (HCC): Primary | ICD-10-CM

## 2025-03-20 DIAGNOSIS — E78.2 MIXED HYPERLIPIDEMIA: ICD-10-CM

## 2025-03-20 DIAGNOSIS — Z79.4 ENCOUNTER FOR LONG-TERM (CURRENT) USE OF INSULIN (HCC): ICD-10-CM

## 2025-03-20 DIAGNOSIS — I10 ESSENTIAL (PRIMARY) HYPERTENSION: ICD-10-CM

## 2025-03-20 PROBLEM — E11.29 TYPE 2 DIABETES MELLITUS WITH OTHER DIABETIC KIDNEY COMPLICATION: Status: ACTIVE | Noted: 2025-03-20

## 2025-03-20 PROCEDURE — 97140 MANUAL THERAPY 1/> REGIONS: CPT | Performed by: PHYSICAL THERAPIST

## 2025-03-20 PROCEDURE — 97035 APP MDLTY 1+ULTRASOUND EA 15: CPT | Performed by: PHYSICAL THERAPIST

## 2025-03-20 PROCEDURE — 97110 THERAPEUTIC EXERCISES: CPT | Performed by: PHYSICAL THERAPIST

## 2025-03-20 PROCEDURE — 99214 OFFICE O/P EST MOD 30 MIN: CPT | Performed by: INTERNAL MEDICINE

## 2025-03-20 RX ORDER — INSULIN GLARGINE 100 [IU]/ML
INJECTION, SOLUTION SUBCUTANEOUS
Qty: 18 ML | Refills: 1 | Status: SHIPPED | OUTPATIENT
Start: 2025-03-20

## 2025-03-20 NOTE — PROGRESS NOTES
[facility] DIABETES AND ENDOCRINOLOGY                Evelin Underwood MD FACE       HISTORY OF PRESENT ILLNESS   Donnell Moreno is a 79   y.o.  male.   HPI   Patient here for f/u   After  visit of  Type 2 diabetes mellitus from  November 2024     Pt sent a message on Friday  saying he was going to get the novolog shot on Monday  I started on novolog insulin to tackle high sugars     He reports taking 10 units with each meal in the past 5 days         Nov 2024     He is now looking for  new plans during open enrollment period   Gained a lb   He is afraid of cost  on basal insulin  He is receiving 2 pens  but paying 35$  a month        July 2024      He is having low sugars ( twice last week )   He is using IOANA   He understood the discrepancy   He is feeling symptoms of low sugars      April 2024     He still continued to have fasting low sugars despite decreasing Lantus  He got the Ioana  Pharmacy hinted him to ask me for higher dose to save a co-pay       March 2024      His low sugars began   on feb 17th , below 70 mg   He has had low sugars every day  He increased lantus to  42  units a day   He is now experiencing Tremors - 199 mg         Feb 2024      Lost 2 lbs   Diagnosed  with prostrate cancer ,  s/p brachy therapy  -    Now feeling dizzy   from use of flomax    His sugars are very high    Log reviewed and he expresses interest   in starting insulin       August 2023     Lost 6 lbs   He had severe  pain issues, spine    ? Pyriformis syndrome   He finally saw PT  and had stretches  and that relieved  his pain       Feb 2023       No meter    He did not walk he says since July 2022, after wife had knee surgery    Gained 9 lbs ( winter wear )          Referred : by pcp      H/o diabetes for 10  years    Current A1C is 8.7 %   From   March 8 2017  and symptoms/problems include fluctuant sugars  and polyuria    Current diabetic medications include glipizide xr 2.5 mg a day and metformin.

## 2025-03-20 NOTE — PROGRESS NOTES
Donnell Moreno is a 79 y.o. male here for   Chief Complaint   Patient presents with    Diabetes       1. Have you been to the ER, urgent care clinic since your last visit?  Hospitalized since your last visit? - no    2. Have you seen or consulted any other health care providers outside of the [facility] since your last visit?  Include any pap smears or colon screening.- Orthopedics- on call dr for shot - sciatic pain

## 2025-03-20 NOTE — PROGRESS NOTES
functional goals. (see flow sheet as applicable)     Details if applicable:     20  47310 Manual Therapy (timed):  decrease pain and increase ROM to improve patient's ability to progress to PLOF and address remaining functional goals.  The manual therapy interventions were performed at a separate and distinct time from the therapeutic activities interventions . (see flow sheet as applicable)    Details if applicable:  lumbar PA glides, STM.           Details if applicable:            Details if applicable:     24     Total Total     US x 6 minutes at 1.5 wts/cm2 , 100 %:  lumbar spine.  Prone position.  No adverse reaction:       [x]  Patient Education billed concurrently with other procedures   [x] Review HEP  p/o, TKE, HC, LTR  [] Progressed/Changed HEP, detail: wall slides, left lateral glides.    [] Other detail:       Other Objective/Functional Measures    Mild TTP left lumbar paraspinals.   Pain Level at end of session (0-10 scale): 0      Assessment   Good results from injection.  He is on the schedule for 1 visit next week.   Consider discharge if improvement persists.     Patient will continue to benefit from skilled PT / OT services to modify and progress therapeutic interventions to address functional deficits and attain remaining goals.    Progress toward goals / Updated goals:  []  See Progress Note/Recertification  Short Term Goals: To be accomplished in 12 treatments.  Improve SLR prom to 50 d. Bilat.  Improve prone prom knee flex to 140 bilat.  Long Term Goals: To be accomplished in 24 treatments.  Restore to full ADLs including normal tolerance for standing, walking, quilting.        PLAN  Yes  Continue plan of care  Re-Cert Due: 6/6/25  [x]  Upgrade activities as tolerated  []  Discharge due to:  []  Other:      April Waldemar Humphrey PT       3/20/2025       8:59 AM

## 2025-03-20 NOTE — PATIENT INSTRUCTIONS
SPECIFIC INSTRUCTIONS BELOW     DRINK water   Do not skip meals  Do not eat in between meals    Reduce carbs- pasta, rice, potatoes, bread   Try to avoid processed bread products like BISCUITS, CROISSANTS, MUFFINS    Do not drink juices or sodas, even if they are calorie zero or diet drinks and especially avoid using powders like crystalloids , ORSANNA-AIDS     Do not eat peanut butter     Do not eat sugar free cookies and cakes   Do not eat peaches, oranges, pineapples, raisins, grapes , canteloupe , honey dew, mangoes , watermelon  and fruit medleys      -----------------------------------------      Stay  on pravachol 20 mg at night       Stay  on  metformin  500 mg to 2 pills  twice a day with meals      Stay on  januvia 100 mg a day  ( pt assistance )      lantus    at  14    units  at  bed time    (   20  units at  night  @ pharmacy  )     Be aware of  false  lows  by  IOANA      Less than 70 mg - no insulin       Stay on  repaglinide   2 mg  right before each meal   Do not take it if you are not eating   Cut the pill to half if eating lesser than normal   Do not avoid lunches      --------------------------------------------------------      Take additional novolog  as follows with meals:    151-200 mg 3 units    201-250 mg 4 units    251-300 mg 5 units    301-350 mg 6 units    351-400 mg 7 units    401-450 mg 8 units    451-500 mg 9 units      Less than 70-    no insulin     -------------PAY ATTENTION TO THESE GENERAL INSTRUCTIONS -----------------      - The medications prescribed at this visit will not be available at pharmacy until 6 pm       - YOUR MED LIST IS NOT UP TO DATE AS SOME CHANGES ARE BEING MADE AFTER THE VISIT - FOLLOW SPECIFIC INSTRUCTIONS  ABOVE     -ANY tests other than blood work, which you opt to do  outside the  [facility] facilities, you are responsible for prior authorizations if  required    - HEALTH MAINTENANCE IS NOT GOING TO BE UP TO DATE ON YOUR AVS- PLEASE IGNORE

## 2025-03-24 DIAGNOSIS — Z79.4 ENCOUNTER FOR LONG-TERM (CURRENT) USE OF INSULIN (HCC): ICD-10-CM

## 2025-03-24 DIAGNOSIS — E11.65 TYPE 2 DIABETES MELLITUS WITH HYPERGLYCEMIA, WITHOUT LONG-TERM CURRENT USE OF INSULIN (HCC): Primary | ICD-10-CM

## 2025-03-24 RX ORDER — INSULIN ASPART INJECTION 100 [IU]/ML
INJECTION, SOLUTION SUBCUTANEOUS
Qty: 15 ML | Refills: 5 | Status: SHIPPED | OUTPATIENT
Start: 2025-03-24

## 2025-03-25 ENCOUNTER — HOSPITAL ENCOUNTER (OUTPATIENT)
Facility: HOSPITAL | Age: 80
Setting detail: RECURRING SERIES
Discharge: HOME | End: 2025-03-28
Payer: MEDICARE

## 2025-03-25 PROCEDURE — 97140 MANUAL THERAPY 1/> REGIONS: CPT | Performed by: PHYSICAL THERAPIST

## 2025-03-25 PROCEDURE — 97035 APP MDLTY 1+ULTRASOUND EA 15: CPT | Performed by: PHYSICAL THERAPIST

## 2025-03-25 PROCEDURE — 97110 THERAPEUTIC EXERCISES: CPT | Performed by: PHYSICAL THERAPIST

## 2025-03-25 NOTE — PROGRESS NOTES
remaining functional goals. (see flow sheet as applicable)     Details if applicable:     20  01031 Manual Therapy (timed):  decrease pain and increase ROM to improve patient's ability to progress to PLOF and address remaining functional goals.  The manual therapy interventions were performed at a separate and distinct time from the therapeutic activities interventions . (see flow sheet as applicable)    Details if applicable:  lumbar PA glides, STM.           Details if applicable:            Details if applicable:     24     Total Total     US x 6 minutes at 1.5 wts/cm2 , 100 %:  lumbar spine.  Prone position.  No adverse reaction:       [x]  Patient Education billed concurrently with other procedures   [x] Review HEP  p/o, TKE, HC, LTR  [] Progressed/Changed HEP, detail: wall slides, left lateral glides.    [] Other detail:       Other Objective/Functional Measures    Mild TTP left lumbar paraspinals.   Pain Level at end of session (0-10 scale): 0      Assessment     Patient continues to have good symptom relief s/p injection.  Discussed continuation of PT an additional 2 weeks .   Emphasis on functional ROM/core strengthening   Patient will continue to benefit from skilled PT / OT services to modify and progress therapeutic interventions to address functional deficits and attain remaining goals.    Progress toward goals / Updated goals:  []  See Progress Note/Recertification  Short Term Goals: To be accomplished in 12 treatments.  Improve SLR prom to 50 d. Bilat.  Improve prone prom knee flex to 140 bilat.  Long Term Goals: To be accomplished in 24 treatments.  Restore to full ADLs including normal tolerance for standing, walking, quilting,  Progressing 3/25/2025.          PLAN  Yes  Continue plan of care  Re-Cert Due: 6/6/25  [x]  Upgrade activities as tolerated  []  Discharge due to:  []  Other:      April Waldemar Humphrey, PT       3/25/2025       9:18 AM

## 2025-03-27 ENCOUNTER — HOSPITAL ENCOUNTER (OUTPATIENT)
Facility: HOSPITAL | Age: 80
Setting detail: RECURRING SERIES
Discharge: HOME | End: 2025-03-30
Payer: MEDICARE

## 2025-03-27 PROCEDURE — 97110 THERAPEUTIC EXERCISES: CPT

## 2025-03-27 NOTE — PROGRESS NOTES
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   8-22 min = 1 unit; 23-37 min = 2 units; 38-52 min = 3 units; 53-67 min = 4 units; 68-82 min = 5 units            SUBJECTIVE    Pain Level (0-10 scale): 1    Any medication changes, allergies to medications, adverse drug reactions, diagnosis change, or new procedure performed?: [x] No    [] Yes (see summary sheet for update)  Medications: Verified on Patient Summary List    Subjective functional status/changes:       Stiffness, but no back pain.     OBJECTIVE      Therapeutic Procedures:  Tx Min Billable or 1:1 Min (if diff from Tx Min) Procedure, Rationale, Specifics   40  63916 Therapeutic Exercise (timed):  increase ROM, strength, coordination, balance, and proprioception to improve patient's ability to progress to PLOF and address remaining functional goals. (see flow sheet as applicable)     Details if applicable:       74537 Neuromuscular Re-Education (timed):  improve balance, coordination, kinesthetic sense, posture, core stability and proprioception to improve patient's ability to develop conscious control of individual muscles and awareness of position of extremities in order to progress to PLOF and address remaining functional goals. (see flow sheet as

## 2025-04-03 ENCOUNTER — HOSPITAL ENCOUNTER (OUTPATIENT)
Facility: HOSPITAL | Age: 80
Setting detail: RECURRING SERIES
Discharge: HOME | End: 2025-04-06
Payer: MEDICARE

## 2025-04-03 PROCEDURE — 97110 THERAPEUTIC EXERCISES: CPT

## 2025-04-03 NOTE — PROGRESS NOTES
applicable)     Details if applicable:       48961 Manual Therapy (timed):  decrease pain and increase ROM to improve patient's ability to progress to PLOF and address remaining functional goals.  The manual therapy interventions were performed at a separate and distinct time from the therapeutic activities interventions . (see flow sheet as applicable)    Details if applicable:  lumbar PA glides, STM.           Details if applicable:            Details if applicable:     40     Total Total       [x]  Patient Education billed concurrently with other procedures   [x] Review HEP  p/o, TKE, HC, LTR  [] Progressed/Changed HEP, detail: wall slides, left lateral glides.    [] Other detail:       Other Objective/Functional Measures      Pain Level at end of session (0-10 scale): 0      Assessment     Patient continues to have good symptom relief s/p injection.  Discussed continuation of PT an additional 1.5  weeks .   Emphasis on functional ROM/core strengthening   Patient will continue to benefit from skilled PT / OT services to modify and progress therapeutic interventions to address functional deficits and attain remaining goals.    Progress toward goals / Updated goals:  []  See Progress Note/Recertification  Short Term Goals: To be accomplished in 12 treatments.  Improve SLR prom to 50 d. Bilat.  Improve prone prom knee flex to 140 bilat.  Long Term Goals: To be accomplished in 24 treatments.  Restore to full ADLs including normal tolerance for standing, walking, quilting,  Progressing 3/25/2025.          PLAN  Yes  Continue plan of care  Re-Cert Due: 6/6/25  [x]  Upgrade activities as tolerated  []  Discharge due to:  []  Other:      Venkat Cole, PT       4/3/2025       8:06 AM

## 2025-04-08 ENCOUNTER — HOSPITAL ENCOUNTER (OUTPATIENT)
Facility: HOSPITAL | Age: 80
Setting detail: RECURRING SERIES
Discharge: HOME | End: 2025-04-11
Payer: MEDICARE

## 2025-04-08 PROCEDURE — 97110 THERAPEUTIC EXERCISES: CPT | Performed by: PHYSICAL THERAPIST

## 2025-04-08 NOTE — PROGRESS NOTES
[facility]  [address], [address]  [place] [address]  Phone: [phone]    Fax: [phone]    PHYSICAL THERAPY PROGRESS NOTE  Patient Name:  Donnell Moreno :  1945   Treatment/Medical Diagnosis: Other intervertebral disc degeneration, lumbar region with discogenic back pain and lower extremity pain [M51.362]   Referral Source:  Oneida Peña PA*     Date of Initial Visit:  3/6/2025 Attended Visits:  9 Missed Visits:  0     SUMMARY OF TREATMENT/ASSESSMENT:  Conservative care for intermittent LBP with radiculopathy.     CURRENT STATUS/GOALS  Progress toward goals / Updated goals:  []  See Progress Note/Recertification  Short Term Goals: To be accomplished in 12 treatments.  Improve SLR prom to 50 d. Bilat.  MET 2025  Improve prone prom knee flex to 140 bilat.  Long Term Goals: To be accomplished in 24 treatments.  Restore to full ADLs including normal tolerance for standing, walking, quilting,  Progressing  2025           RECOMMENDATIONS FOR SKILLED THERAPY  Continue current POC with emphasis on core stability and posture.         Ju Humphrey, PT       2025       12:26 PM    If you have any questions/comments please contact us directly at [phone].   Thank you for allowing us to assist in the care of your patient.

## 2025-04-08 NOTE — PROGRESS NOTES
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   8-22 min = 1 unit; 23-37 min = 2 units; 38-52 min = 3 units; 53-67 min = 4 units; 68-82 min = 5 units            SUBJECTIVE    Pain Level (0-10 scale): 0    Any medication changes, allergies to medications, adverse drug reactions, diagnosis change, or new procedure performed?: [x] No    [] Yes (see summary sheet for update)  Medications: Verified on Patient Summary List    Subjective functional status/changes:       Patient reports that he has mild symptoms while sitting in recliner.     OBJECTIVE      Therapeutic Procedures:  Tx Min Billable or 1:1 Min (if diff from Tx Min) Procedure, Rationale, Specifics   40  98807 Therapeutic Exercise (timed):  increase ROM, strength, coordination, balance, and proprioception to improve patient's ability to progress to PLOF and address remaining functional goals. (see flow sheet as applicable)     Details if applicable:       72124 Neuromuscular Re-Education (timed):  improve balance, coordination, kinesthetic sense, posture, core stability and proprioception to improve patient's ability to develop conscious control of individual muscles and awareness of position of extremities in order to progress to PLOF and address remaining

## 2025-04-14 ENCOUNTER — HOSPITAL ENCOUNTER (OUTPATIENT)
Facility: HOSPITAL | Age: 80
Setting detail: RECURRING SERIES
Discharge: HOME | End: 2025-04-17
Payer: MEDICARE

## 2025-04-14 PROCEDURE — 97110 THERAPEUTIC EXERCISES: CPT

## 2025-04-14 SDOH — HEALTH STABILITY: PHYSICAL HEALTH: ON AVERAGE, HOW MANY MINUTES DO YOU ENGAGE IN EXERCISE AT THIS LEVEL?: 0 MIN

## 2025-04-14 SDOH — HEALTH STABILITY: PHYSICAL HEALTH: ON AVERAGE, HOW MANY DAYS PER WEEK DO YOU ENGAGE IN MODERATE TO STRENUOUS EXERCISE (LIKE A BRISK WALK)?: 0 DAYS

## 2025-04-14 ASSESSMENT — PATIENT HEALTH QUESTIONNAIRE - PHQ9
SUM OF ALL RESPONSES TO PHQ QUESTIONS 1-9: 0
1. LITTLE INTEREST OR PLEASURE IN DOING THINGS: NOT AT ALL
SUM OF ALL RESPONSES TO PHQ QUESTIONS 1-9: 0
2. FEELING DOWN, DEPRESSED OR HOPELESS: NOT AT ALL
SUM OF ALL RESPONSES TO PHQ QUESTIONS 1-9: 0
SUM OF ALL RESPONSES TO PHQ QUESTIONS 1-9: 0

## 2025-04-14 ASSESSMENT — LIFESTYLE VARIABLES
HOW OFTEN DO YOU HAVE SIX OR MORE DRINKS ON ONE OCCASION: 1
HOW MANY STANDARD DRINKS CONTAINING ALCOHOL DO YOU HAVE ON A TYPICAL DAY: PATIENT DOES NOT DRINK
HOW MANY STANDARD DRINKS CONTAINING ALCOHOL DO YOU HAVE ON A TYPICAL DAY: 0
HOW OFTEN DO YOU HAVE A DRINK CONTAINING ALCOHOL: 2
HOW OFTEN DO YOU HAVE A DRINK CONTAINING ALCOHOL: MONTHLY OR LESS

## 2025-04-14 NOTE — PROGRESS NOTES
PHYSICAL THERAPY - MEDICARE DAILY TREATMENT NOTE (updated 3/23)      Date: 2025          Patient Name:  Donnell Moreno :  1945   Medical   Diagnosis:  Other intervertebral disc degeneration, lumbar region with discogenic back pain and lower extremity pain [M51.362] Treatment Diagnosis:  M54.31  SCIATICA, RIGHT SIDE    Referral Source:  Oneida Peña PA* Insurance:   Payor: MEDICARE / Plan: MEDICARE PART A AND B / Product Type: *No Product type* /                     Patient  verified yes     Visit #   Current  / Total 10 24   Time   In / Out 915 1000   Total Treatment Time 45   Total Timed Codes 40   1:1 Treatment Time 40      [facility] Totals Reminder:  bill using total billable   min of TIMED therapeutic procedures and modalities.   8-22 min = 1 unit; 23-37 min = 2 units; 38-52 min = 3 units; 53-67 min = 4 units; 68-82 min = 5 units            SUBJECTIVE    Pain Level (0-10 scale): 2/10 posterior left thigh    Went for spine ortho consult; definitely do not need surgery. A bulging disc that should get less symptomatic with time.    Any medication changes, allergies to medications, adverse drug reactions, diagnosis change, or new procedure performed?: [x] No    [] Yes (see summary sheet for update)  Medications: Verified on Patient Summary List    Subjective functional status/changes:           OBJECTIVE      Therapeutic Procedures:  Tx Min Billable or 1:1 Min (if diff from Tx Min) Procedure, Rationale, Specifics   40  11489 Therapeutic Exercise (timed):  increase ROM, strength, coordination, balance, and proprioception to improve patient's ability to progress to PLOF and address remaining functional goals. (see flow sheet as applicable)     Details if applicable:       12380 Neuromuscular Re-Education (timed):  improve balance, coordination, kinesthetic sense, posture, core stability and proprioception to improve patient's ability to develop conscious control of individual muscles and awareness

## 2025-04-15 ENCOUNTER — APPOINTMENT (OUTPATIENT)
Facility: HOSPITAL | Age: 80
End: 2025-04-15
Payer: MEDICARE

## 2025-04-17 ENCOUNTER — HOSPITAL ENCOUNTER (OUTPATIENT)
Facility: HOSPITAL | Age: 80
Setting detail: RECURRING SERIES
Discharge: HOME | End: 2025-04-20
Payer: MEDICARE

## 2025-04-17 PROCEDURE — 97110 THERAPEUTIC EXERCISES: CPT | Performed by: PHYSICAL THERAPIST

## 2025-04-17 PROCEDURE — 97035 APP MDLTY 1+ULTRASOUND EA 15: CPT | Performed by: PHYSICAL THERAPIST

## 2025-04-17 PROCEDURE — 97140 MANUAL THERAPY 1/> REGIONS: CPT | Performed by: PHYSICAL THERAPIST

## 2025-04-17 NOTE — PROGRESS NOTES
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   8-22 min = 1 unit; 23-37 min = 2 units; 38-52 min = 3 units; 53-67 min = 4 units; 68-82 min = 5 units            SUBJECTIVE    Pain Level (0-10 scale): 0    Went for spine ortho consult; definitely do not need surgery. A bulging disc that should get less symptomatic with time.    Any medication changes, allergies to medications, adverse drug reactions, diagnosis change, or new procedure performed?: [x] No    [] Yes (see summary sheet for update)  Medications: Verified on Patient Summary List    Subjective functional status/changes:       My left hamstring got little crampy after the last session of PT.    Can we take is easy today.       OBJECTIVE      Therapeutic Procedures:  Tx Min Billable or 1:1 Min (if diff from Tx Min) Procedure, Rationale, Specifics   20  52379 Therapeutic Exercise (timed):  increase ROM, strength, coordination, balance, and proprioception to improve patient's ability to progress to PLOF and address remaining functional goals. (see flow sheet as applicable)     Details if applicable:       95164 Neuromuscular Re-Education (timed):  improve balance, coordination, kinesthetic sense, posture, core stability and proprioception to

## 2025-04-20 SDOH — ECONOMIC STABILITY: INCOME INSECURITY: IN THE LAST 12 MONTHS, WAS THERE A TIME WHEN YOU WERE NOT ABLE TO PAY THE MORTGAGE OR RENT ON TIME?: NO

## 2025-04-20 SDOH — ECONOMIC STABILITY: FOOD INSECURITY: WITHIN THE PAST 12 MONTHS, THE FOOD YOU BOUGHT JUST DIDN'T LAST AND YOU DIDN'T HAVE MONEY TO GET MORE.: NEVER TRUE

## 2025-04-20 SDOH — ECONOMIC STABILITY: FOOD INSECURITY: WITHIN THE PAST 12 MONTHS, YOU WORRIED THAT YOUR FOOD WOULD RUN OUT BEFORE YOU GOT MONEY TO BUY MORE.: NEVER TRUE

## 2025-04-20 SDOH — ECONOMIC STABILITY: TRANSPORTATION INSECURITY
IN THE PAST 12 MONTHS, HAS THE LACK OF TRANSPORTATION KEPT YOU FROM MEDICAL APPOINTMENTS OR FROM GETTING MEDICATIONS?: NO

## 2025-04-22 ENCOUNTER — HOSPITAL ENCOUNTER (OUTPATIENT)
Facility: HOSPITAL | Age: 80
Setting detail: RECURRING SERIES
Discharge: HOME | End: 2025-04-25
Payer: MEDICARE

## 2025-04-22 PROCEDURE — 97112 NEUROMUSCULAR REEDUCATION: CPT | Performed by: PHYSICAL THERAPIST

## 2025-04-22 PROCEDURE — 97110 THERAPEUTIC EXERCISES: CPT | Performed by: PHYSICAL THERAPIST

## 2025-04-22 NOTE — PROGRESS NOTES
PHYSICAL THERAPY - MEDICARE DAILY TREATMENT NOTE (updated 3/23)      Date: 2025          Patient Name:  Donnell Moreno :  1945   Medical   Diagnosis:  Other intervertebral disc degeneration, lumbar region with discogenic back pain and lower extremity pain [M51.362] Treatment Diagnosis:  M54.31  SCIATICA, RIGHT SIDE    Referral Source:  Oneida Peña PA* Insurance:   Payor: MEDICARE / Plan: MEDICARE PART A AND B / Product Type: *No Product type* /                     Patient  verified yes     Visit #   Current  / Total 12 24 POC 25   Time   In / Out 1030 am  1115 am    Total Treatment Time 45   Total Timed Codes 40   1:1 Treatment Time 40      [facility] Totals Reminder:  bill using total billable   min of TIMED therapeutic procedures and modalities.   8-22 min = 1 unit; 23-37 min = 2 units; 38-52 min = 3 units; 53-67 min = 4 units; 68-82 min = 5 units            SUBJECTIVE    Pain Level (0-10 scale): 0    Went for spine ortho consult; definitely do not need surgery. A bulging disc that should get less symptomatic with time.    Any medication changes, allergies to medications, adverse drug reactions, diagnosis change, or new procedure performed?: [x] No    [] Yes (see summary sheet for update)  Medications: Verified on Patient Summary List    Subjective functional status/changes:       Continues to be asymptomatic.  Stiff after sitting for > 15 minutes.       OBJECTIVE      Therapeutic Procedures:  Tx Min Billable or 1:1 Min (if diff from Tx Min) Procedure, Rationale, Specifics   20  81929 Therapeutic Exercise (timed):  increase ROM, strength, coordination, balance, and proprioception to improve patient's ability to progress to PLOF and address remaining functional goals. (see flow sheet as applicable)     Details if applicable:     20  66294 Neuromuscular Re-Education (timed):  improve balance, coordination, kinesthetic sense, posture, core stability and proprioception to improve patient's

## 2025-04-23 ENCOUNTER — OFFICE VISIT (OUTPATIENT)
Facility: CLINIC | Age: 80
End: 2025-04-23
Payer: MEDICARE

## 2025-04-23 VITALS
DIASTOLIC BLOOD PRESSURE: 70 MMHG | RESPIRATION RATE: 20 BRPM | SYSTOLIC BLOOD PRESSURE: 150 MMHG | HEART RATE: 78 BPM | BODY MASS INDEX: 26.24 KG/M2 | OXYGEN SATURATION: 97 % | WEIGHT: 167.2 LBS | HEIGHT: 67 IN | TEMPERATURE: 97.3 F

## 2025-04-23 DIAGNOSIS — R26.89 IMPAIRMENT OF BALANCE: ICD-10-CM

## 2025-04-23 DIAGNOSIS — G25.0 ESSENTIAL TREMOR: ICD-10-CM

## 2025-04-23 DIAGNOSIS — E11.29 TYPE 2 DIABETES MELLITUS WITH OTHER DIABETIC KIDNEY COMPLICATION (HCC): ICD-10-CM

## 2025-04-23 DIAGNOSIS — Z85.46 HISTORY OF PROSTATE CANCER: ICD-10-CM

## 2025-04-23 DIAGNOSIS — Z00.00 MEDICARE ANNUAL WELLNESS VISIT, SUBSEQUENT: Primary | ICD-10-CM

## 2025-04-23 DIAGNOSIS — I10 ESSENTIAL HYPERTENSION: ICD-10-CM

## 2025-04-23 PROBLEM — E11.9 TYPE 2 DIABETES MELLITUS (HCC): Status: RESOLVED | Noted: 2023-04-20 | Resolved: 2025-04-23

## 2025-04-23 PROCEDURE — 3077F SYST BP >= 140 MM HG: CPT | Performed by: FAMILY MEDICINE

## 2025-04-23 PROCEDURE — 1126F AMNT PAIN NOTED NONE PRSNT: CPT | Performed by: FAMILY MEDICINE

## 2025-04-23 PROCEDURE — G2211 COMPLEX E/M VISIT ADD ON: HCPCS | Performed by: FAMILY MEDICINE

## 2025-04-23 PROCEDURE — 3078F DIAST BP <80 MM HG: CPT | Performed by: FAMILY MEDICINE

## 2025-04-23 PROCEDURE — 99214 OFFICE O/P EST MOD 30 MIN: CPT | Performed by: FAMILY MEDICINE

## 2025-04-23 PROCEDURE — 1159F MED LIST DOCD IN RCRD: CPT | Performed by: FAMILY MEDICINE

## 2025-04-23 PROCEDURE — G8427 DOCREV CUR MEDS BY ELIG CLIN: HCPCS | Performed by: FAMILY MEDICINE

## 2025-04-23 PROCEDURE — 1036F TOBACCO NON-USER: CPT | Performed by: FAMILY MEDICINE

## 2025-04-23 PROCEDURE — 3051F HG A1C>EQUAL 7.0%<8.0%: CPT | Performed by: FAMILY MEDICINE

## 2025-04-23 PROCEDURE — 1123F ACP DISCUSS/DSCN MKR DOCD: CPT | Performed by: FAMILY MEDICINE

## 2025-04-23 PROCEDURE — G0439 PPPS, SUBSEQ VISIT: HCPCS | Performed by: FAMILY MEDICINE

## 2025-04-23 PROCEDURE — G8419 CALC BMI OUT NRM PARAM NOF/U: HCPCS | Performed by: FAMILY MEDICINE

## 2025-04-23 RX ORDER — LOSARTAN POTASSIUM 25 MG/1
25 TABLET ORAL DAILY
Qty: 90 TABLET | Refills: 1 | Status: SHIPPED | OUTPATIENT
Start: 2025-04-23

## 2025-04-23 NOTE — PROGRESS NOTES
The patient, Donnell Moreno, identity was verified by name and MRN.  Chief Complaint   Patient presents with    Medicare AWV     No LMP for male patient.  BP (!) 150/70 (BP Site: Right Upper Arm, Patient Position: Sitting, BP Cuff Size: Large Adult)   Pulse 78   Temp 97.3 °F (36.3 °C) (Temporal)   Resp 20   Ht 1.702 m (5' 7\")   Wt 75.8 kg (167 lb 3.2 oz)   SpO2 97%   BMI 26.19 kg/m²       4/14/2025     8:29 AM   Amb Fall Risk Assessment and TUG Test   Do you feel unsteady or are you worried about falling?  no   2 or more falls in past year? no   Fall with injury in past year? no     No data recorded  \"Have you been to the ER, urgent care clinic since your last visit?  Hospitalized since your last visit?\"    NO    “Have you seen or consulted any other health care providers outside our system since your last visit?”    NO           Social History     Substance and Sexual Activity   Sexual Activity Not Currently    Partners: Female    Birth control/protection: None    Comment: Just my wife     Medication list reviewed and active medications noted. Patient is taking medications as directed.  See documentation in medication activity.  Allergies: allergy list reviewed, no new allergies added    Medicare Awv Health Risk Assessment / Depression Screen       Question 4/14/2025  8:29 AM EDT - Filed by Patient    Fall Risk Screening     Do you feel unsteady or are you worried about falling? no    Have you fallen 2 or more times in the past year?   no    Have you had any fall with injury in the past year?   no    Health Risk Assessment / General     In general, how would you say your health is? Very Good    In the past 7 days, have you experienced any of the following: New or Increased Pain, New or Increased Fatigue, Loneliness, Social Isolation, Stress or Anger? No    Do you have a Living Will? Yes    Health Habits/ Nutrition     On average, how many days per week do you engage in moderate to strenuous exercise (like a 
500 MG tablet TAKE TWO TABLETS BY MOUTH TWICE A DAY WITH A MEAL Yes Evelin Underwood MD   pravastatin (PRAVACHOL) 20 MG tablet TAKE ONE TABLET BY MOUTH EVERY NIGHT Yes Evelin Underwood MD   JANUVIA 100 MG tablet TAKE ONE TABLET BY MOUTH EVERY DAY Yes Tg Nieto MD   blood glucose test strips (TRUE METRIX BLOOD GLUCOSE TEST) strip Use to check BG 3 times daily. Dx code E11.65 Yes Evelin Underwood MD   glucose monitoring kit 1 kit by Does not apply route daily Yes Evelin Underwood MD   aspirin 81 MG chewable tablet Take 1 tablet by mouth daily Yes ProviderKaylah MD   cetirizine (ZYRTEC) 10 MG tablet Take 1 tablet by mouth daily Yes ProviderKaylah MD CareTeam (Including outside providers/suppliers regularly involved in providing care):   Patient Care Team:  Sharla Villa MD as PCP - General (Family Medicine)  Sharla Villa MD as PCP - Empaneled Provider  ALBARO Hare MD (Radiation Oncology)     Recommendations for Preventive Services Due: see orders and patient instructions/AVS.  Recommended screening schedule for the next 5-10 years is provided to the patient in written form: see Patient Instructions/AVS.     Reviewed and updated this visit:  Tobacco  Allergies  Meds  Problems  Med Hx  Surg Hx  Fam Hx  Sexual   Hx                  The patient (or guardian, if applicable) and other individuals in attendance with the patient were advised that Artificial Intelligence will be utilized during this visit to record and process the conversation to generate a clinical note. The patient (or guardian, if applicable) and other individuals in attendance at the appointment consented to the use of AI, including the recording.

## 2025-04-24 ENCOUNTER — HOSPITAL ENCOUNTER (OUTPATIENT)
Facility: HOSPITAL | Age: 80
Setting detail: RECURRING SERIES
Discharge: HOME | End: 2025-04-27
Payer: MEDICARE

## 2025-04-24 LAB
BASOPHILS # BLD AUTO: 0.1 X10E3/UL (ref 0–0.2)
BASOPHILS NFR BLD AUTO: 1 %
EOSINOPHIL # BLD AUTO: 0.3 X10E3/UL (ref 0–0.4)
EOSINOPHIL NFR BLD AUTO: 4 %
ERYTHROCYTE [DISTWIDTH] IN BLOOD BY AUTOMATED COUNT: 12.6 % (ref 11.6–15.4)
HCT VFR BLD AUTO: 46.7 % (ref 37.5–51)
HGB BLD-MCNC: 15 G/DL (ref 13–17.7)
IMM GRANULOCYTES # BLD AUTO: 0 X10E3/UL (ref 0–0.1)
IMM GRANULOCYTES NFR BLD AUTO: 0 %
LYMPHOCYTES # BLD AUTO: 1.7 X10E3/UL (ref 0.7–3.1)
LYMPHOCYTES NFR BLD AUTO: 20 %
MCH RBC QN AUTO: 30 PG (ref 26.6–33)
MCHC RBC AUTO-ENTMCNC: 32.1 G/DL (ref 31.5–35.7)
MCV RBC AUTO: 93 FL (ref 79–97)
MONOCYTES # BLD AUTO: 0.7 X10E3/UL (ref 0.1–0.9)
MONOCYTES NFR BLD AUTO: 9 %
NEUTROPHILS # BLD AUTO: 5.8 X10E3/UL (ref 1.4–7)
NEUTROPHILS NFR BLD AUTO: 66 %
PLATELET # BLD AUTO: 239 X10E3/UL (ref 150–450)
RBC # BLD AUTO: 5 X10E6/UL (ref 4.14–5.8)
WBC # BLD AUTO: 8.6 X10E3/UL (ref 3.4–10.8)

## 2025-04-24 PROCEDURE — 97112 NEUROMUSCULAR REEDUCATION: CPT | Performed by: PHYSICAL THERAPIST

## 2025-04-24 PROCEDURE — 97110 THERAPEUTIC EXERCISES: CPT | Performed by: PHYSICAL THERAPIST

## 2025-04-24 NOTE — PROGRESS NOTES
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   8-22 min = 1 unit; 23-37 min = 2 units; 38-52 min = 3 units; 53-67 min = 4 units; 68-82 min = 5 units            SUBJECTIVE    Pain Level (0-10 scale): 0    Went for spine ortho consult; definitely do not need surgery. A bulging disc that should get less symptomatic with time.    Any medication changes, allergies to medications, adverse drug reactions, diagnosis change, or new procedure performed?: [x] No    [] Yes (see summary sheet for update)  Medications: Verified on Patient Summary List    Subjective functional status/changes:       Continues to be asymptomatic.  Stiff after sitting for > 15 minutes.       OBJECTIVE      Therapeutic Procedures:  Tx Min Billable or 1:1 Min (if diff from Tx Min) Procedure, Rationale, Specifics   20  59707 Therapeutic Exercise (timed):  increase ROM, strength, coordination, balance, and proprioception to improve patient's ability to progress to PLOF and address remaining functional goals. (see flow sheet as applicable)     Details if applicable:     20  66158 Neuromuscular Re-Education (timed):  improve balance, coordination, kinesthetic sense, posture, core stability and proprioception to improve patient's

## 2025-04-24 NOTE — PROGRESS NOTES
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   8-22 min = 1 unit; 23-37 min = 2 units; 38-52 min = 3 units; 53-67 min = 4 units; 68-82 min = 5 units            SUBJECTIVE    Pain Level (0-10 scale): 0    Went for spine ortho consult; definitely do not need surgery. A bulging disc that should get less symptomatic with time.    Any medication changes, allergies to medications, adverse drug reactions, diagnosis change, or new procedure performed?: [x] No    [] Yes (see summary sheet for update)  Medications: Verified on Patient Summary List    Subjective functional status/changes:       No pain.  Persistent stiffness.       OBJECTIVE      Therapeutic Procedures:  Tx Min Billable or 1:1 Min (if diff from Tx Min) Procedure, Rationale, Specifics   20  64685 Therapeutic Exercise (timed):  increase ROM, strength, coordination, balance, and proprioception to improve patient's ability to progress to PLOF and address remaining functional goals. (see flow sheet as applicable)     Details if applicable:     20  35480 Neuromuscular Re-Education (timed):  improve balance, coordination, kinesthetic sense, posture, core stability and proprioception to improve patient's ability to develop conscious control of

## 2025-04-24 NOTE — PROGRESS NOTES
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 Totals Reminder:  bill using total billable   min of TIMED therapeutic procedures and modalities.   8-22 min = 1 unit; 23-37 min = 2 units; 38-52 min = 3 units; 53-67 min = 4 units; 68-82 min = 5 units            SUBJECTIVE    Pain Level (0-10 scale): 0    Went for spine ortho consult; definitely do not need surgery. A bulging disc that should get less symptomatic with time.    Any medication changes, allergies to medications, adverse drug reactions, diagnosis change, or new procedure performed?: [x] No    [] Yes (see summary sheet for update)  Medications: Verified on Patient Summary List    Subjective functional status/changes:       Continues to be asymptomatic.  Stiff after sitting for > 15 minutes.       OBJECTIVE      Therapeutic Procedures:  Tx Min Billable or 1:1 Min (if diff from Tx Min) Procedure, Rationale, Specifics   20  95229 Therapeutic Exercise (timed):  increase ROM, strength, coordination, balance, and proprioception to improve patient's ability to progress to PLOF and address remaining functional goals. (see flow sheet as applicable)     Details if applicable:     20  57662 Neuromuscular Re-Education (timed):  improve balance, coordination, kinesthetic sense, posture, core stability and proprioception to improve patient's

## 2025-04-27 ENCOUNTER — RESULTS FOLLOW-UP (OUTPATIENT)
Facility: CLINIC | Age: 80
End: 2025-04-27

## 2025-04-29 ENCOUNTER — HOSPITAL ENCOUNTER (OUTPATIENT)
Facility: HOSPITAL | Age: 80
Setting detail: RECURRING SERIES
Discharge: HOME | End: 2025-05-02
Payer: MEDICARE

## 2025-04-29 PROCEDURE — 97112 NEUROMUSCULAR REEDUCATION: CPT | Performed by: PHYSICAL THERAPIST

## 2025-04-29 PROCEDURE — 97110 THERAPEUTIC EXERCISES: CPT | Performed by: PHYSICAL THERAPIST

## 2025-04-29 NOTE — PROGRESS NOTES
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   8-22 min = 1 unit; 23-37 min = 2 units; 38-52 min = 3 units; 53-67 min = 4 units; 68-82 min = 5 units            SUBJECTIVE    Pain Level (0-10 scale): 0    Went for spine ortho consult; definitely do not need surgery. A bulging disc that should get less symptomatic with time.    Any medication changes, allergies to medications, adverse drug reactions, diagnosis change, or new procedure performed?: [x] No    [] Yes (see summary sheet for update)  Medications: Verified on Patient Summary List    Subjective functional status/changes:       Continues to be asymptomatic.  Stiff after sitting for > 15 minutes.  Walked about an hour yesterday without any symptoms.       OBJECTIVE      Therapeutic Procedures:  Tx Min Billable or 1:1 Min (if diff from Tx Min) Procedure, Rationale, Specifics   20  59693 Therapeutic Exercise (timed):  increase ROM, strength, coordination, balance, and proprioception to improve patient's ability to progress to PLOF and address remaining functional goals. (see flow sheet as applicable)     Details if applicable:     20  06144 Neuromuscular Re-Education (timed):  improve balance, coordination, kinesthetic sense, posture, core

## 2025-05-01 ENCOUNTER — HOSPITAL ENCOUNTER (OUTPATIENT)
Facility: HOSPITAL | Age: 80
Setting detail: RECURRING SERIES
Discharge: HOME | End: 2025-05-04
Payer: MEDICARE

## 2025-05-01 PROCEDURE — 97112 NEUROMUSCULAR REEDUCATION: CPT | Performed by: PHYSICAL THERAPIST

## 2025-05-01 PROCEDURE — 97110 THERAPEUTIC EXERCISES: CPT | Performed by: PHYSICAL THERAPIST

## 2025-05-01 NOTE — PROGRESS NOTES
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   8-22 min = 1 unit; 23-37 min = 2 units; 38-52 min = 3 units; 53-67 min = 4 units; 68-82 min = 5 units            SUBJECTIVE    Pain Level (0-10 scale): 0    Went for spine ortho consult; definitely do not need surgery. A bulging disc that should get less symptomatic with time.    Any medication changes, allergies to medications, adverse drug reactions, diagnosis change, or new procedure performed?: [x] No    [] Yes (see summary sheet for update)  Medications: Verified on Patient Summary List    Subjective functional status/changes:       Patient reports he is ready to be discharge to [facility].          OBJECTIVE      Therapeutic Procedures:  Tx Min Billable or 1:1 Min (if diff from Tx Min) Procedure, Rationale, Specifics   20  83000 Therapeutic Exercise (timed):  increase ROM, strength, coordination, balance, and proprioception to improve patient's ability to progress to PLOF and address remaining functional goals. (see flow sheet as applicable)     Details if applicable:     20  56641 Neuromuscular Re-Education (timed):  improve balance, coordination, kinesthetic sense, posture, core stability and proprioception to improve patient's ability to develop

## 2025-07-07 ENCOUNTER — TELEPHONE (OUTPATIENT)
Age: 80
End: 2025-07-07

## 2025-07-07 DIAGNOSIS — E11.65 TYPE 2 DIABETES MELLITUS WITH HYPERGLYCEMIA, WITHOUT LONG-TERM CURRENT USE OF INSULIN (HCC): ICD-10-CM

## 2025-07-07 DIAGNOSIS — Z79.4 ENCOUNTER FOR LONG-TERM (CURRENT) USE OF INSULIN (HCC): ICD-10-CM

## 2025-07-10 NOTE — TELEPHONE ENCOUNTER
Spoke to [facility]- they didn't need any information from us. They are overnight shipping Rx Januvia to pt.

## 2025-07-22 ENCOUNTER — OFFICE VISIT (OUTPATIENT)
Age: 80
End: 2025-07-22
Payer: MEDICARE

## 2025-07-22 VITALS
WEIGHT: 167 LBS | HEIGHT: 67 IN | SYSTOLIC BLOOD PRESSURE: 135 MMHG | BODY MASS INDEX: 26.21 KG/M2 | DIASTOLIC BLOOD PRESSURE: 70 MMHG | HEART RATE: 88 BPM

## 2025-07-22 DIAGNOSIS — R06.02 SHORTNESS OF BREATH: ICD-10-CM

## 2025-07-22 DIAGNOSIS — E78.2 MIXED HYPERLIPIDEMIA: ICD-10-CM

## 2025-07-22 DIAGNOSIS — R06.09 DOE (DYSPNEA ON EXERTION): ICD-10-CM

## 2025-07-22 DIAGNOSIS — I10 ESSENTIAL HYPERTENSION: Primary | ICD-10-CM

## 2025-07-22 PROCEDURE — 99204 OFFICE O/P NEW MOD 45 MIN: CPT | Performed by: SPECIALIST

## 2025-07-22 PROCEDURE — 3078F DIAST BP <80 MM HG: CPT | Performed by: SPECIALIST

## 2025-07-22 PROCEDURE — 93005 ELECTROCARDIOGRAM TRACING: CPT | Performed by: SPECIALIST

## 2025-07-22 PROCEDURE — G8419 CALC BMI OUT NRM PARAM NOF/U: HCPCS | Performed by: SPECIALIST

## 2025-07-22 PROCEDURE — G8427 DOCREV CUR MEDS BY ELIG CLIN: HCPCS | Performed by: SPECIALIST

## 2025-07-22 PROCEDURE — 1126F AMNT PAIN NOTED NONE PRSNT: CPT | Performed by: SPECIALIST

## 2025-07-22 PROCEDURE — 1160F RVW MEDS BY RX/DR IN RCRD: CPT | Performed by: SPECIALIST

## 2025-07-22 PROCEDURE — 93010 ELECTROCARDIOGRAM REPORT: CPT | Performed by: SPECIALIST

## 2025-07-22 PROCEDURE — 3075F SYST BP GE 130 - 139MM HG: CPT | Performed by: SPECIALIST

## 2025-07-22 PROCEDURE — 1123F ACP DISCUSS/DSCN MKR DOCD: CPT | Performed by: SPECIALIST

## 2025-07-22 PROCEDURE — 1036F TOBACCO NON-USER: CPT | Performed by: SPECIALIST

## 2025-07-22 PROCEDURE — 1159F MED LIST DOCD IN RCRD: CPT | Performed by: SPECIALIST

## 2025-07-22 NOTE — PATIENT INSTRUCTIONS
Patient Education        Learning About the Mediterranean Diet  What is the Mediterranean diet?     The Mediterranean diet is a style of eating rather than a diet plan. It features foods eaten in [place], [place], [place], and other countries along the [place]. It emphasizes eating foods like fish, fruits, vegetables, beans, high-fiber breads and whole grains, nuts, and olive oil. This style of eating includes limited red meat, cheese, and sweets.  Why choose the Mediterranean diet?  A Mediterranean-style diet may improve heart health. It contains more fat than other heart-healthy diets. But the fats are mainly from nuts, unsaturated oils (such as fish oils and olive oil), and certain nut or seed oils (such as canola, soybean, or flaxseed oil). These fats may help protect the heart and blood vessels.  How can you get started on the Mediterranean diet?  Here are some things you can do to switch to a more Mediterranean way of eating.  What to eat  Eat a variety of fruits and vegetables each day, such as grapes, blueberries, tomatoes, broccoli, peppers, figs, olives, spinach, eggplant, beans, lentils, and chickpeas.  Eat a variety of whole-grain foods each day, such as oats, brown rice, and whole wheat bread, pasta, and couscous.  Eat fish at least 2 times a week. Try tuna, salmon, mackerel, lake trout, herring, or sardines.  Eat moderate amounts of low-fat dairy products, such as milk, cheese, or yogurt.  Eat moderate amounts of poultry and eggs.  Choose healthy (unsaturated) fats, such as nuts, olive oil, and certain nut or seed oils like canola, soybean, and flaxseed.  Limit unhealthy (saturated) fats, such as butter, palm oil, and coconut oil. And limit fats found in animal products, such as meat and dairy products made with whole milk. Try to eat red meat only a few times a month in very small amounts.  Limit sweets and desserts to only a few times a week. This includes sugar-sweetened

## 2025-07-22 NOTE — PROGRESS NOTES
Mary August MD. [facility]          Patient: Donnell Moreno  : 1945      Today's Date: 2025        HISTORY OF PRESENT ILLNESS:     History of Present Illness:    No CP, palpitations, sig SOB - class 1 LEE    HTN   Home BP range 125-153/70's  Losartan 25mg qD started 3 months     Father had cardiac history       PAST MEDICAL HISTORY:     Past Medical History:   Diagnosis Date    Allergies     Arthritis     DM (diabetes mellitus) (HCC)     Hearing loss 14    Actual date unknown. Have hearing aids.    Hyperlipidemia     Prostate cancer (HCC)     Sciatica        Past Surgical History:   Procedure Laterality Date    COLONOSCOPY  [id number]    screening    COLONOSCOPY N/A 2023    COLONOSCOPY performed by Lianet Castaneda MD at [facility] ENDOSCOPY    CYST REMOVAL      HEMORRHOID SURGERY      PROSTATE SURGERY  11/15/2019    biopsy    TONSILLECTOMY  1950             CURRENT MEDICATIONS:    .  Current Outpatient Medications   Medication Sig Dispense Refill    losartan (COZAAR) 25 MG tablet Take 1 tablet by mouth daily 90 tablet 1    Insulin Aspart, w/Niacinamide, (FIASP FLEXTOUCH) 100 UNIT/ML SOPN Use this insulin before meals 3 times a day as advised  TO MAX DOSE OF 30 units a day  . 151 to 200  mg   3 units  ;  201 to  250 mg   4  units ;  251  to 300 mg  5 units ;   301  to 350 mg  6 units ;  351 to 400 mg  7 units ;  over 400 mg  8 units 15 mL 5    insulin glargine (LANTUS SOLOSTAR) 100 UNIT/ML injection pen 20  units at  bed time 18 mL 1    repaglinide (PRANDIN) 2 MG tablet TAKE ONE TABLET BY MOUTH THREE TIMES A DAY RIGHT BEFORE EACH MEAL 270 tablet 3    Insulin Pen Needle (BD PEN NEEDLE TAINA 2ND GEN) 32G X 4 MM MISC USE TO INJECT INSULIN  FOUR  times  DAILY 200 each 3    metFORMIN (GLUCOPHAGE) 500 MG tablet TAKE TWO TABLETS BY MOUTH TWICE A DAY WITH A MEAL 360 tablet 3    pravastatin (PRAVACHOL) 20 MG tablet TAKE ONE TABLET BY MOUTH EVERY NIGHT 90 tablet 3    JANUVIA 100 MG tablet TAKE ONE TABLET BY

## 2025-07-29 ENCOUNTER — ANCILLARY PROCEDURE (OUTPATIENT)
Age: 80
End: 2025-07-29
Payer: MEDICARE

## 2025-07-29 VITALS
DIASTOLIC BLOOD PRESSURE: 76 MMHG | HEART RATE: 101 BPM | SYSTOLIC BLOOD PRESSURE: 128 MMHG | BODY MASS INDEX: 26.21 KG/M2 | HEIGHT: 67 IN | WEIGHT: 167 LBS

## 2025-07-29 DIAGNOSIS — E78.2 MIXED HYPERLIPIDEMIA: ICD-10-CM

## 2025-07-29 DIAGNOSIS — R06.02 SHORTNESS OF BREATH: ICD-10-CM

## 2025-07-29 DIAGNOSIS — I10 ESSENTIAL HYPERTENSION: ICD-10-CM

## 2025-07-29 DIAGNOSIS — R06.09 DOE (DYSPNEA ON EXERTION): ICD-10-CM

## 2025-07-29 LAB
ECHO BSA: 1.89 M2
EKG DIAGNOSIS: NORMAL
STRESS ANGINA INDEX: 0
STRESS BASELINE DIAS BP: 76 MMHG
STRESS BASELINE HR: 110 BPM
STRESS BASELINE SYS BP: 128 MMHG
STRESS ESTIMATED WORKLOAD: 9.3 METS
STRESS EXERCISE DUR MIN: 6 MIN
STRESS EXERCISE DUR SEC: 41 SEC
STRESS O2 SAT PEAK: 99 %
STRESS O2 SAT REST: 97 %
STRESS PEAK DIAS BP: 84 MMHG
STRESS PEAK SYS BP: 162 MMHG
STRESS PERCENT HR ACHIEVED: 115 %
STRESS POST PEAK HR: 164 BPM
STRESS RATE PRESSURE PRODUCT: NORMAL BPM*MMHG
STRESS TARGET HR: 142 BPM

## 2025-07-29 PROCEDURE — 93018 CV STRESS TEST I&R ONLY: CPT | Performed by: SPECIALIST

## 2025-07-29 PROCEDURE — 93017 CV STRESS TEST TRACING ONLY: CPT | Performed by: SPECIALIST

## 2025-07-29 PROCEDURE — 93016 CV STRESS TEST SUPVJ ONLY: CPT | Performed by: SPECIALIST

## 2025-08-06 DIAGNOSIS — C61 PROSTATE CANCER (HCC): Primary | ICD-10-CM

## 2025-08-28 ENCOUNTER — OFFICE VISIT (OUTPATIENT)
Facility: CLINIC | Age: 80
End: 2025-08-28

## 2025-08-28 VITALS
WEIGHT: 169.6 LBS | SYSTOLIC BLOOD PRESSURE: 128 MMHG | HEART RATE: 66 BPM | BODY MASS INDEX: 26.62 KG/M2 | HEIGHT: 67 IN | TEMPERATURE: 97.7 F | DIASTOLIC BLOOD PRESSURE: 76 MMHG | OXYGEN SATURATION: 98 %

## 2025-08-28 DIAGNOSIS — I10 ESSENTIAL HYPERTENSION: ICD-10-CM

## 2025-08-28 DIAGNOSIS — E78.2 MIXED HYPERLIPIDEMIA: ICD-10-CM

## 2025-08-28 DIAGNOSIS — E11.29 TYPE 2 DIABETES MELLITUS WITH OTHER DIABETIC KIDNEY COMPLICATION (HCC): Primary | ICD-10-CM

## 2025-08-28 DIAGNOSIS — Z85.46 HISTORY OF PROSTATE CANCER: ICD-10-CM

## 2025-08-28 DIAGNOSIS — G25.0 ESSENTIAL TREMOR: ICD-10-CM

## 2025-08-28 PROBLEM — M54.2 NECK PAIN: Status: RESOLVED | Noted: 2023-04-20 | Resolved: 2025-08-28

## 2025-08-28 RX ORDER — LOSARTAN POTASSIUM 25 MG/1
25 TABLET ORAL DAILY
Qty: 90 TABLET | Refills: 2 | Status: SHIPPED | OUTPATIENT
Start: 2025-08-28

## 2025-09-01 DIAGNOSIS — E11.65 TYPE 2 DIABETES MELLITUS WITH HYPERGLYCEMIA, WITHOUT LONG-TERM CURRENT USE OF INSULIN (HCC): Primary | ICD-10-CM

## 2025-09-02 ENCOUNTER — LAB (OUTPATIENT)
Facility: CLINIC | Age: 80
End: 2025-09-02

## 2025-09-02 RX ORDER — PRAVASTATIN SODIUM 20 MG
20 TABLET ORAL NIGHTLY
Qty: 90 TABLET | Refills: 3 | Status: SHIPPED | OUTPATIENT
Start: 2025-09-02

## 2025-09-03 LAB — PSA SERPL DL<=0.01 NG/ML-MCNC: 1.24 NG/ML (ref 0–4)
